# Patient Record
Sex: FEMALE | Race: ASIAN | NOT HISPANIC OR LATINO | Employment: FULL TIME | URBAN - METROPOLITAN AREA
[De-identification: names, ages, dates, MRNs, and addresses within clinical notes are randomized per-mention and may not be internally consistent; named-entity substitution may affect disease eponyms.]

---

## 2018-03-10 ENCOUNTER — TRANSFERRED RECORDS (OUTPATIENT)
Dept: HEALTH INFORMATION MANAGEMENT | Facility: CLINIC | Age: 28
End: 2018-03-10

## 2018-03-10 LAB — PAP SMEAR - HIM PATIENT REPORTED: NEGATIVE

## 2019-01-10 ENCOUNTER — OFFICE VISIT (OUTPATIENT)
Dept: OBGYN | Facility: CLINIC | Age: 29
End: 2019-01-10
Payer: COMMERCIAL

## 2019-01-10 VITALS
BODY MASS INDEX: 22.19 KG/M2 | SYSTOLIC BLOOD PRESSURE: 96 MMHG | DIASTOLIC BLOOD PRESSURE: 66 MMHG | WEIGHT: 113 LBS | HEIGHT: 60 IN

## 2019-01-10 DIAGNOSIS — N92.6 IRREGULAR MENSTRUAL CYCLE: Primary | ICD-10-CM

## 2019-01-10 PROCEDURE — 99202 OFFICE O/P NEW SF 15 MIN: CPT | Performed by: OBSTETRICS & GYNECOLOGY

## 2019-01-10 RX ORDER — TETANUS TOXOID, REDUCED DIPHTHERIA TOXOID AND ACELLULAR PERTUSSIS VACCINE, ADSORBED 5; 2.5; 8; 8; 2.5 [IU]/.5ML; [IU]/.5ML; UG/.5ML; UG/.5ML; UG/.5ML
SUSPENSION INTRAMUSCULAR
Refills: 0 | COMMUNITY
Start: 2018-10-21 | End: 2019-01-24

## 2019-01-10 RX ORDER — MULTIPLE VITAMINS W/ MINERALS TAB 9MG-400MCG
1 TAB ORAL DAILY
COMMUNITY
End: 2019-06-06

## 2019-01-10 SDOH — HEALTH STABILITY: MENTAL HEALTH: HOW OFTEN DO YOU HAVE A DRINK CONTAINING ALCOHOL?: NEVER

## 2019-01-10 ASSESSMENT — MIFFLIN-ST. JEOR: SCORE: 1170.09

## 2019-01-10 NOTE — PROGRESS NOTES
SUBJECTIVE:                                                   Marisel Perry is a 28 year old female P0 who presents to clinic today for the following health issue(s):  Patient presents with:  Consult: c/o irregular menses since periods started menses at age 13.  Trying for pregnancy x 3 months      HPI:  27 yo P0  female presents for evaluation of irregular periods.  This is her first visit in the [facility] system.  She states she experienced menarche in sixth grade commensurate with her peers.  She states she has irregular cycles which vary in length from 30-60 days her cycles are associated with moliminal symptoms.    She was  10 months ago and they have been attempting pregnancy for the last 3 months.  She states her  has had a semen analysis and it was negative.  He has not fathered any children or had any health problems they were just making sure.    Patient's general health is excellent.  He is presently on no medications.  She had a normal Pap smear in [facility] in 2018 and was told she would not need one for 3 years.  She has been told she has low insulin in the past and was put on insulin pills but states she never took them.    The patient desires to conceive and has educated herself regarding typical conception timeframe.    Patient's last menstrual period was 2018 (exact date)..   Patient is sexually active, .  Using none for contraception.     Problem list and histories reviewed & adjusted, as indicated.  Additional history: as documented.    There is no problem list on file for this patient.    Past Surgical History:   Procedure Laterality Date     NO HISTORY OF SURGERY  2019      Social History     Tobacco Use     Smoking status: Never Smoker     Smokeless tobacco: Never Used   Substance Use Topics     Alcohol use: No     Frequency: Never      Problem (# of Occurrences) Relation (Name,Age of Onset)    Diabetes Type 2  (1) Father              Current  "Outpatient Medications on File Prior to Visit:  multivitamin w/minerals (MULTI-VITAMIN) tablet Take 1 tablet by mouth daily   BOOSTRIX 5-2.5-18.5 LF-MCG/0.5 injection ADM 0.5ML IM UTD     No current facility-administered medications on file prior to visit.   No Known Allergies    ROS:  5 point ROS negative except as noted above in HPI, including Gen., Resp., CV, GI &  system review.    OBJECTIVE:     BP 96/66   Ht 1.518 m (4' 11.75\")   Wt 51.3 kg (113 lb)   LMP 12/25/2018 (Exact Date)   Breastfeeding? No   BMI 22.25 kg/m     BMI: Body mass index is 22.25 kg/m .  General: Alert and oriented, no distress.  Psychiatric: Mood and affect within normal limits.    Exam deferred    ASSESSMENT/PLAN:                                                        ICD-10-CM    1. Irregular menstrual cycle N92.6 Glucose     Hemoglobin A1c     Prolactin     TSH with free T4 reflex     Progesterone         Discussed typical cycle patterns and the need to establish whether she is ovulatory.    We will check a TSH prolactin glucose hemoglobin A1c and progesterone.  We will check a progesterone level in a mid luteal phase to determine ovulation.    Ultimately patient may benefit from ovulation induction agents but it would be premature to institute this at present.    Taco Singh MD  [facility]  "

## 2019-01-10 NOTE — Clinical Note
Pap smear done on this date: 3/10/2018, by this group: In [facility], results were Normal per patient needs next in 3 years.

## 2019-01-10 NOTE — NURSING NOTE
"Chief Complaint   Patient presents with     Consult     c/o irregular menses since periods started menses at age 13.  Trying for pregnancy x 3 months       Initial BP 96/66   Ht 1.518 m (4' 11.75\")   Wt 51.3 kg (113 lb)   LMP 2018 (Exact Date)   Breastfeeding? No   BMI 22.25 kg/m   Estimated body mass index is 22.25 kg/m  as calculated from the following:    Height as of this encounter: 1.518 m (4' 11.75\").    Weight as of this encounter: 51.3 kg (113 lb).  BP completed using cuff size: regular    Questioned patient about current smoking habits.  Pt. has never smoked.          The following HM Due: NONE      The following patient reported/Care Every where data was sent to:  P ABSTRACT QUALITY INITIATIVES [[id number]]  Pap smear done on this date: 3/10/2018, by this group: In [facility], results were Normal per patient needs next in 3 years.       Georgia Ramos CMA               "

## 2019-01-17 DIAGNOSIS — N92.6 IRREGULAR MENSTRUAL CYCLE: ICD-10-CM

## 2019-01-17 LAB
GLUCOSE SERPL-MCNC: 96 MG/DL (ref 70–99)
HBA1C MFR BLD: 5.2 % (ref 0–5.6)
PROGEST SERPL-MCNC: 0.4 NG/ML
PROLACTIN SERPL-MCNC: 18 UG/L (ref 3–27)
T4 FREE SERPL-MCNC: 1.12 NG/DL (ref 0.76–1.46)
TSH SERPL DL<=0.005 MIU/L-ACNC: 5.39 MU/L (ref 0.4–4)

## 2019-01-17 PROCEDURE — 84144 ASSAY OF PROGESTERONE: CPT | Performed by: OBSTETRICS & GYNECOLOGY

## 2019-01-17 PROCEDURE — 36415 COLL VENOUS BLD VENIPUNCTURE: CPT | Performed by: OBSTETRICS & GYNECOLOGY

## 2019-01-17 PROCEDURE — 82947 ASSAY GLUCOSE BLOOD QUANT: CPT | Performed by: OBSTETRICS & GYNECOLOGY

## 2019-01-17 PROCEDURE — 84439 ASSAY OF FREE THYROXINE: CPT | Performed by: OBSTETRICS & GYNECOLOGY

## 2019-01-17 PROCEDURE — 84443 ASSAY THYROID STIM HORMONE: CPT | Performed by: OBSTETRICS & GYNECOLOGY

## 2019-01-17 PROCEDURE — 84146 ASSAY OF PROLACTIN: CPT | Performed by: OBSTETRICS & GYNECOLOGY

## 2019-01-17 PROCEDURE — 83036 HEMOGLOBIN GLYCOSYLATED A1C: CPT | Performed by: OBSTETRICS & GYNECOLOGY

## 2019-01-24 ENCOUNTER — OFFICE VISIT (OUTPATIENT)
Dept: OBGYN | Facility: CLINIC | Age: 29
End: 2019-01-24
Payer: COMMERCIAL

## 2019-01-24 VITALS — DIASTOLIC BLOOD PRESSURE: 52 MMHG | BODY MASS INDEX: 22.19 KG/M2 | WEIGHT: 112.7 LBS | SYSTOLIC BLOOD PRESSURE: 84 MMHG

## 2019-01-24 DIAGNOSIS — N97.0 OLIGO-OVULATION: Primary | ICD-10-CM

## 2019-01-24 PROCEDURE — 99213 OFFICE O/P EST LOW 20 MIN: CPT | Performed by: OBSTETRICS & GYNECOLOGY

## 2019-01-24 RX ORDER — CLOMIPHENE CITRATE 50 MG/1
TABLET ORAL
Qty: 5 TABLET | Refills: 0 | Status: SHIPPED | OUTPATIENT
Start: 2019-01-24 | End: 2019-06-06

## 2019-01-24 NOTE — NURSING NOTE
"Chief Complaint   Patient presents with     RECHECK     review lab results       Initial BP (!) 84/52   Wt 51.1 kg (112 lb 11.2 oz)   LMP 2018 (Exact Date)   BMI 22.19 kg/m   Estimated body mass index is 22.19 kg/m  as calculated from the following:    Height as of 1/10/19: 1.518 m (4' 11.75\").    Weight as of this encounter: 51.1 kg (112 lb 11.2 oz).  BP completed using cuff size: regular    Questioned patient about current smoking habits.  Pt. has never smoked.          Georgia Ramos CMA               "

## 2019-01-24 NOTE — PROGRESS NOTES
Chief Complaint   Patient presents with     RECHECK     review lab results       Subjective:  29 yo P0 with Q30-60 day cycles and interested in conception.    We reviewed lab results form 1/10/19 visit  Results for GIANNA SWENSON (MRN [id number]) as of 1/24/2019 09:44   Ref. Range 1/17/2019 07:41   Hemoglobin A1C Latest Ref Range: 0 - 5.6 % 5.2   Progesterone Latest Units: ng/mL 0.4   Prolactin Latest Ref Range: 3 - 27 ug/L 18   T4 Free Latest Ref Range: 0.76 - 1.46 ng/dL 1.12   TSH Latest Ref Range: 0.40 - 4.00 mU/L 5.39 (H)   Glucose Latest Ref Range: 70 - 99 mg/dL 96     REVIEW OF SYSTEMS:  neg    Health Maintenance   Topic Date Due     HPV IMMUNIZATION (1 - Female 3-dose series) 06/28/2003     PHQ-2 Q1 YR  06/28/2004     HIV SCREEN (SYSTEM ASSIGNED)  06/28/2010     INFLUENZA VACCINE (1) 09/01/2018     PAP SCREENING Q3 YR (SYSTEM ASSIGNED)  03/10/2021     DTAP/TDAP/TD IMMUNIZATION (2 - Td) 10/21/2028     ZOSTER IMMUNIZATION (1 of 2) 06/28/2042     IPV IMMUNIZATION  Aged Out     MENINGITIS IMMUNIZATION  Aged Out       No Known Allergies    Objective:  Vitals: BP (!) 84/52   Wt 51.1 kg (112 lb 11.2 oz)   LMP 12/25/2018 (Exact Date)   BMI 22.19 kg/m        Risks, benefits, and alternatives to Clomid use discussed in detail, including ovarian cysts, pelvic pain, increased risks of twinning (5-9%), increased risks of twin pregnancy. She should call with any severe pain, or present to the ED if not during clinic hours. This is rare.    If she does not conceive in the first cycle, will consider increasing the dose for the second cycle to 100 mg, and checking a day 21 progesterone to document ovulation.      Assessment/Plan:  1. Oligo-ovulation  Cycle pattern and progesterone level c/w oligo ovulation    Patient desires to begin ovulation induction     - clomiPHENE (CLOMID) 50 MG tablet; Days 3-7 of cycle  Dispense: 5 tablet; Refill: 0        Joseph Singh MD

## 2019-04-12 DIAGNOSIS — N97.9 FEMALE INFERTILITY: ICD-10-CM

## 2019-04-12 LAB — PROGEST SERPL-MCNC: 0.2 NG/ML

## 2019-04-12 PROCEDURE — 36415 COLL VENOUS BLD VENIPUNCTURE: CPT | Performed by: OBSTETRICS & GYNECOLOGY

## 2019-04-12 PROCEDURE — 84144 ASSAY OF PROGESTERONE: CPT | Performed by: OBSTETRICS & GYNECOLOGY

## 2019-04-12 RX ORDER — MEDROXYPROGESTERONE ACETATE 10 MG
10 TABLET ORAL DAILY
Qty: 10 TABLET | Refills: 0 | Status: SHIPPED | OUTPATIENT
Start: 2019-04-12 | End: 2019-06-06

## 2019-04-12 RX ORDER — CLOMIPHENE CITRATE 50 MG/1
TABLET ORAL
Qty: 10 TABLET | Refills: 0 | Status: SHIPPED | OUTPATIENT
Start: 2019-04-12 | End: 2019-06-06

## 2019-05-02 ENCOUNTER — MYC MEDICAL ADVICE (OUTPATIENT)
Dept: OBGYN | Facility: CLINIC | Age: 29
End: 2019-05-02

## 2019-05-02 ENCOUNTER — OFFICE VISIT (OUTPATIENT)
Dept: OBGYN | Facility: CLINIC | Age: 29
End: 2019-05-02
Payer: COMMERCIAL

## 2019-05-02 VITALS — DIASTOLIC BLOOD PRESSURE: 60 MMHG | BODY MASS INDEX: 22.63 KG/M2 | SYSTOLIC BLOOD PRESSURE: 94 MMHG | WEIGHT: 114.9 LBS

## 2019-05-02 DIAGNOSIS — N97.9 FEMALE INFERTILITY: Primary | ICD-10-CM

## 2019-05-02 LAB — HCG UR QL: NEGATIVE

## 2019-05-02 PROCEDURE — 99212 OFFICE O/P EST SF 10 MIN: CPT | Performed by: OBSTETRICS & GYNECOLOGY

## 2019-05-02 PROCEDURE — 81025 URINE PREGNANCY TEST: CPT | Performed by: OBSTETRICS & GYNECOLOGY

## 2019-05-02 NOTE — NURSING NOTE
"Chief Complaint   Patient presents with     Infertililty   Neg home urine hcg 2 days ago. Does c/o bilat breast tenderness and nausea x 2 days.    C/o brownish discharge on tissue x 2 days    Initial BP 94/60   Wt 52.1 kg (114 lb 14.4 oz)   LMP 2019 (Exact Date)   BMI 22.63 kg/m   Estimated body mass index is 22.63 kg/m  as calculated from the following:    Height as of 1/10/19: 1.518 m (4' 11.75\").    Weight as of this encounter: 52.1 kg (114 lb 14.4 oz).  BP completed using cuff size: regular    Questioned patient about current smoking habits.  Pt. has never smoked.          The following HM Due: NONE      Georgia Ramos CMA                "

## 2019-05-02 NOTE — PROGRESS NOTES
Here for follow-up of infertility    28-year-old with oligomenorrhea on Clomid.  Took Provera from 4/12-4/21 and and now experiencing withdrawal bleed.    Patient had done 2 home pregnancy tests that few days ago which were negative but was experiencing some breast tenderness and nausea.  Urinary pregnancy test here was negative.    We had plan on increasing Clomid to 100 mg daily days 3 through 7 she was advised to begin this tomorrow.    As she is demonstrating some resistance to the Clomid I advised referral to [facility] specialist be initiated at this time.  A referral form was filled out and provided to the patient along with contact information for [facility].    Physical examination was deferred

## 2019-05-22 ENCOUNTER — MYC MEDICAL ADVICE (OUTPATIENT)
Dept: OBGYN | Facility: CLINIC | Age: 29
End: 2019-05-22

## 2019-05-23 ENCOUNTER — MYC MEDICAL ADVICE (OUTPATIENT)
Dept: OBGYN | Facility: CLINIC | Age: 29
End: 2019-05-23

## 2019-05-23 DIAGNOSIS — N97.0 OLIGO-OVULATION: Primary | ICD-10-CM

## 2019-05-23 RX ORDER — CLOMIPHENE CITRATE 50 MG/1
TABLET ORAL
Qty: 10 TABLET | Refills: 0 | Status: SHIPPED | OUTPATIENT
Start: 2019-05-23 | End: 2019-06-06

## 2019-06-04 DIAGNOSIS — N93.9 ABNORMAL UTERINE BLEEDING (AUB): ICD-10-CM

## 2019-06-06 ENCOUNTER — OFFICE VISIT (OUTPATIENT)
Dept: OBGYN | Facility: CLINIC | Age: 29
End: 2019-06-06
Payer: COMMERCIAL

## 2019-06-06 VITALS
WEIGHT: 115 LBS | DIASTOLIC BLOOD PRESSURE: 56 MMHG | BODY MASS INDEX: 22.58 KG/M2 | SYSTOLIC BLOOD PRESSURE: 90 MMHG | HEIGHT: 60 IN

## 2019-06-06 DIAGNOSIS — N97.9 FEMALE INFERTILITY: Primary | ICD-10-CM

## 2019-06-06 DIAGNOSIS — N92.6 IRREGULAR MENSTRUAL CYCLE: ICD-10-CM

## 2019-06-06 DIAGNOSIS — N97.0 OLIGO-OVULATION: ICD-10-CM

## 2019-06-06 DIAGNOSIS — E03.8 SUBCLINICAL HYPOTHYROIDISM: ICD-10-CM

## 2019-06-06 PROCEDURE — 99215 OFFICE O/P EST HI 40 MIN: CPT | Performed by: OBSTETRICS & GYNECOLOGY

## 2019-06-06 RX ORDER — LETROZOLE 2.5 MG/1
7.5 TABLET, FILM COATED ORAL DAILY
Qty: 5 TABLET | Refills: 1 | Status: SHIPPED | OUTPATIENT
Start: 2019-06-06 | End: 2019-08-20

## 2019-06-06 RX ORDER — LEVOTHYROXINE SODIUM 50 UG/1
50 TABLET ORAL DAILY
Qty: 90 TABLET | Refills: 3 | Status: ON HOLD | OUTPATIENT
Start: 2019-06-06 | End: 2019-11-20

## 2019-06-06 RX ORDER — PRENATAL VIT/IRON FUM/FOLIC AC 27MG-0.8MG
1 TABLET ORAL DAILY
COMMUNITY
End: 2019-09-10

## 2019-06-06 ASSESSMENT — MIFFLIN-ST. JEOR: SCORE: 1179.17

## 2019-06-06 NOTE — PATIENT INSTRUCTIONS
Cycle instructions:    The first day of bleeding/period is called Day 1.    Letrozole is taken Days 3-7 of cycle, about same time each day.  Take the medication even if you are still bleeding.      Start testing for ovulation using the store bought ovulation predictor kits on Day 11 of cycle.  When you get a positive surge, you will most likely be ovulating within the next 24 hours. Test the urine about the same time each day (should try to test between 2-4 pm, empty bladder about noon.)    The test is positive when you see 2 dark solid lines (one faint line and one dark line is NOT positive). Follow the directions with the kit, as some brands and digital tests will have different positive results. Once the test is positive, you can stop testing. Have sex/intercourse the day the test is positive and the next day.    Schedule a lab only appointment for about 7-9 days after your ovulation test is positive to check the progesterone level and make sure ovulation occurred.     If you don't have a period by 15 days after LH kit is positive (surge), then do a urine pregnancy test at home and call clinic if positive. If it is negative, retest in 5-7 days if no period. Call or send a NPTV message with your results. Depending on the ultrasound results and labs, we may need to adjust the dose if you are not pregnant in this cycle.    Basic info:    The ovulation predictor kits are found in the condom/tampon section of the store.  Clear Blue Easy brand is simple to read.    Most women will get a positive LH surge before day 20 of the cycle.    Letrozole is not FDA approved for this indication (ovulation help), but there are studies showing pregnancy rates that are higher than with Clomid, especially when Clomid has not worked.  Aromatase inhibitors are generally well tolerated. The main side effects are hot flushes and gastrointestinal events (nausea and vomiting), headache, back pain, and leg cramps.  Take it at same time  each night to minimize side effects.    There is a chance of twins (around 3-4%) Rarely, women will develop painful cysts on the ovaries.     Do not use lubricants with intercourse when trying to get pregnant, unless you use a lubricant called PreSeed, which is fine.

## 2019-06-06 NOTE — NURSING NOTE
"Chief Complaint   Patient presents with     Consult     Infertility       Initial BP 90/56 (BP Location: Right arm, Patient Position: Chair, Cuff Size: Adult Regular)   Ht 1.518 m (4' 11.75\")   Wt 52.2 kg (115 lb)   LMP 2019 (Exact Date)   Breastfeeding? No   BMI 22.65 kg/m   Estimated body mass index is 22.65 kg/m  as calculated from the following:    Height as of this encounter: 1.518 m (4' 11.75\").    Weight as of this encounter: 52.2 kg (115 lb).  BP completed using cuff size: regular    Questioned patient about current smoking habits.  Pt. has never smoked.          The following HM Due: NONE    Rashmi Forrest CMA      "

## 2019-06-06 NOTE — PROGRESS NOTES
FERTILITY OB/GYN OFFICE VISIT    Marisel Perry was seen today for initial infertility consultation.   She is a 28 year old year old  who has been attempting conception for 1 year(s).  Her partner has not fathered any children.  She has tried Clomid x3 cycles with no success (50 mg, 100 mg, 100 mg).  She most recently started Clomid on 2019.  States she has not had positive results on her home ovulation kits.  On 2019 and 2018 her progesterone levels resulted low indicating lack of ovulation.  In January she had a TSH level that resulted elevated at 5.39 with a normal T4.  On 2019 pelvic ultrasound demonstrated a right ovary with multiple follicles.  She reports a known history of PCOS.  Her partner reports a normal semen analysis.    Prior pregnancy history is as follows:   OB History    Para Term  AB Living   0 0 0 0 0 0   SAB TAB Ectopic Multiple Live Births   0 0 0 0 0     History of dilation and suction curettage: No.    HX:  No Known Allergies  Past Medical History:   Diagnosis Date     Primary female infertility      Past Surgical History:   Procedure Laterality Date     NO HISTORY OF SURGERY  2019     Social History     Socioeconomic History     Marital status:      Spouse name: Not on file     Number of children: Not on file     Years of education: Not on file     Highest education level: Not on file   Occupational History     Not on file   Social Needs     Financial resource strain: Not on file     Food insecurity:     Worry: Not on file     Inability: Not on file     Transportation needs:     Medical: Not on file     Non-medical: Not on file   Tobacco Use     Smoking status: Never Smoker     Smokeless tobacco: Never Used   Substance and Sexual Activity     Alcohol use: No     Frequency: Never     Drug use: No     Sexual activity: Yes     Partners: Female     Birth control/protection: None   Lifestyle     Physical activity:     Days per week: Not on  "file     Minutes per session: Not on file     Stress: Not on file   Relationships     Social connections:     Talks on phone: Not on file     Gets together: Not on file     Attends Zoroastrian service: Not on file     Active member of club or organization: Not on file     Attends meetings of clubs or organizations: Not on file     Relationship status: Not on file     Intimate partner violence:     Fear of current or ex partner: Not on file     Emotionally abused: Not on file     Physically abused: Not on file     Forced sexual activity: Not on file   Other Topics Concern     Not on file   Social History Narrative     Not on file     Family History   Problem Relation Age of Onset     Diabetes Type 2  Father      Current Outpatient Medications   Medication Sig Dispense Refill     Prenatal Vit-Fe Fumarate-FA (PRENATAL MULTIVITAMIN W/IRON) 27-0.8 MG tablet Take 1 tablet by mouth daily             ROS: negative for 12 systems unless noted above in HPI     EXAM:  Vitals: BP 90/56 (BP Location: Right arm, Patient Position: Chair, Cuff Size: Adult Regular)   Ht 1.518 m (4' 11.75\")   Wt 52.2 kg (115 lb)   LMP 05/22/2019 (Exact Date)   Breastfeeding? No   BMI 22.65 kg/m    BMI= Body mass index is 22.65 kg/m .      Gen: Alert, oriented, in no distress. Normal body habitus. Complete development of secondary sexual characteristics.   Psych: Normal mood and appropriate affect.  Skin: No signs of androgen excess - no hirsutism, acne, male pattern baldness.  HEENT: Thyroid not enlarged.  Breasts: No evidence of galactorrhea.  Lungs: Respirations unlabored and without SOB.   Abdominal exam: No mass, tenderness, or organomegaly. Abdominal obesity: No.  Chest: Not shield/square shaped to suggest Faustin Syndrome  Pelvic exam: External genitalia within normal limits. No virilization. Urethra is without lesion and nontender to palpation. Bladder is nontender. On speculum exam, cervix is without lesion and vagina is normal without " lesion or discharge. No vaginal/cervical structural abnormalities or discharge to suggest the presence of a mullerian anomaly, infection or cervical factor. On bimanual exam, the uterus is mobile and non-tender; size and mobility normal. The adnexa are negative bilaterally for mass or tenderness. No tenderness, nodules or masses in the posterior cul-de-sac, uterosacral ligaments, or rectovaginal septum.        ASSESSMENT/ PLAN:    1. Female infertility  - levothyroxine (SYNTHROID/LEVOTHROID) 50 MCG tablet; Take 1 tablet (50 mcg) by mouth daily  Dispense: 90 tablet; Refill: 3  - Thyroid peroxidase antibody; Future  - progesterone (PROMETRIUM) 100 MG capsule; Take 1 capsule (100 mg) by mouth daily for 10 days  Dispense: 10 capsule; Refill: 1  - letrozole (FEMARA) 2.5 MG tablet; Take 3 tablets (7.5 mg) by mouth daily for 5 days  Dispense: 15 tablet; Refill: 1  - Progesterone; Future    2. Irregular menstrual cycle      3. Oligo-ovulation      4. Subclinical hypothyroidism  - levothyroxine (SYNTHROID/LEVOTHROID) 50 MCG tablet; Take 1 tablet (50 mcg) by mouth daily  Dispense: 90 tablet; Refill: 3  - Thyroid peroxidase antibody; Future      Plan labs as ordered.  Will treat subclinical hypothyroidism given infertility. TPO antibiody ordered to be drawn at time of progesterone level.  Offered referral to [facility] or [facility] and patient and  declined at this time.  They would like to change to Femara/letrozole PO instead of Clomid after the current cycle.  As they have already tried 2 rounds of Clomid without successful ovulation and are currently on the 3rd cycle.  Prometrium prescribed if no period and negative pregnancy test. Will start at 7.5 mg with the next cycle if indicated.     Cycle instructions reviewed in detail as outlined in the patient instructions.    Total face to face time 45 minutes in history taking, fertility education, discussion of her next steps in the plan, with > 50% spent counseling and/or  coordination of care.    Neeta Aguirre, DO

## 2019-06-21 ENCOUNTER — NURSE TRIAGE (OUTPATIENT)
Dept: NURSING | Facility: CLINIC | Age: 29
End: 2019-06-21

## 2019-06-21 NOTE — TELEPHONE ENCOUNTER
Pharmacist (Khadijah) from [facility] [phone] calls re Patient.  States she thought she was calling the clinic and sates she will follow up with the Provider on Monday 6/24/19.     Protocol-  Medication Question- Adult  Care advice reviewed.   Disposition-  Follow up with the Provider within 24 hours.  Caller states understanding of the recommended disposition.   Caller states her question is not urgent and can wait until Monday.   Advised to call back if further questions or concerns.     LANDRY Bass RN  [facility]     Reason for Disposition    Caller has NON-URGENT medication question about med that PCP prescribed and triager unable to answer question    Protocols used: MEDICATION QUESTION CALL-A-

## 2019-07-22 DIAGNOSIS — Z32.01 PREGNANCY TEST POSITIVE: ICD-10-CM

## 2019-07-22 DIAGNOSIS — E03.8 SUBCLINICAL HYPOTHYROIDISM: ICD-10-CM

## 2019-07-22 PROCEDURE — 84144 ASSAY OF PROGESTERONE: CPT | Performed by: OBSTETRICS & GYNECOLOGY

## 2019-07-22 PROCEDURE — 86376 MICROSOMAL ANTIBODY EACH: CPT | Performed by: OBSTETRICS & GYNECOLOGY

## 2019-07-22 PROCEDURE — 36415 COLL VENOUS BLD VENIPUNCTURE: CPT | Performed by: OBSTETRICS & GYNECOLOGY

## 2019-07-22 PROCEDURE — 84702 CHORIONIC GONADOTROPIN TEST: CPT | Performed by: OBSTETRICS & GYNECOLOGY

## 2019-07-23 LAB
B-HCG SERPL-ACNC: 991 IU/L (ref 0–5)
PROGEST SERPL-MCNC: 60 NG/ML
THYROPEROXIDASE AB SERPL-ACNC: 11 IU/ML

## 2019-08-19 ASSESSMENT — PATIENT HEALTH QUESTIONNAIRE - PHQ9
SUM OF ALL RESPONSES TO PHQ QUESTIONS 1-9: 5
SUM OF ALL RESPONSES TO PHQ QUESTIONS 1-9: 5
10. IF YOU CHECKED OFF ANY PROBLEMS, HOW DIFFICULT HAVE THESE PROBLEMS MADE IT FOR YOU TO DO YOUR WORK, TAKE CARE OF THINGS AT HOME, OR GET ALONG WITH OTHER PEOPLE: NOT DIFFICULT AT ALL

## 2019-08-20 ENCOUNTER — PRENATAL OFFICE VISIT (OUTPATIENT)
Dept: NURSING | Facility: CLINIC | Age: 29
End: 2019-08-20
Payer: COMMERCIAL

## 2019-08-20 VITALS
BODY MASS INDEX: 23.16 KG/M2 | HEART RATE: 64 BPM | WEIGHT: 118 LBS | HEIGHT: 60 IN | DIASTOLIC BLOOD PRESSURE: 44 MMHG | SYSTOLIC BLOOD PRESSURE: 86 MMHG

## 2019-08-20 DIAGNOSIS — E03.9 HYPOTHYROIDISM: Primary | ICD-10-CM

## 2019-08-20 DIAGNOSIS — Z34.01 ENCOUNTER FOR SUPERVISION OF NORMAL FIRST PREGNANCY IN FIRST TRIMESTER: ICD-10-CM

## 2019-08-20 LAB
ERYTHROCYTE [DISTWIDTH] IN BLOOD BY AUTOMATED COUNT: 12.3 % (ref 10–15)
HCT VFR BLD AUTO: 34.5 % (ref 35–47)
HGB BLD-MCNC: 11.8 G/DL (ref 11.7–15.7)
MCH RBC QN AUTO: 29.4 PG (ref 26.5–33)
MCHC RBC AUTO-ENTMCNC: 34.2 G/DL (ref 31.5–36.5)
MCV RBC AUTO: 86 FL (ref 78–100)
PLATELET # BLD AUTO: 256 10E9/L (ref 150–450)
RBC # BLD AUTO: 4.02 10E12/L (ref 3.8–5.2)
WBC # BLD AUTO: 9.3 10E9/L (ref 4–11)

## 2019-08-20 PROCEDURE — 86850 RBC ANTIBODY SCREEN: CPT | Performed by: OBSTETRICS & GYNECOLOGY

## 2019-08-20 PROCEDURE — 86780 TREPONEMA PALLIDUM: CPT | Performed by: OBSTETRICS & GYNECOLOGY

## 2019-08-20 PROCEDURE — 87591 N.GONORRHOEAE DNA AMP PROB: CPT | Performed by: OBSTETRICS & GYNECOLOGY

## 2019-08-20 PROCEDURE — 87389 HIV-1 AG W/HIV-1&-2 AB AG IA: CPT | Performed by: OBSTETRICS & GYNECOLOGY

## 2019-08-20 PROCEDURE — 36415 COLL VENOUS BLD VENIPUNCTURE: CPT | Performed by: OBSTETRICS & GYNECOLOGY

## 2019-08-20 PROCEDURE — 87491 CHLMYD TRACH DNA AMP PROBE: CPT | Performed by: OBSTETRICS & GYNECOLOGY

## 2019-08-20 PROCEDURE — 86762 RUBELLA ANTIBODY: CPT | Performed by: OBSTETRICS & GYNECOLOGY

## 2019-08-20 PROCEDURE — 87086 URINE CULTURE/COLONY COUNT: CPT | Performed by: OBSTETRICS & GYNECOLOGY

## 2019-08-20 PROCEDURE — 84443 ASSAY THYROID STIM HORMONE: CPT | Performed by: OBSTETRICS & GYNECOLOGY

## 2019-08-20 PROCEDURE — 84439 ASSAY OF FREE THYROXINE: CPT | Performed by: OBSTETRICS & GYNECOLOGY

## 2019-08-20 PROCEDURE — 87340 HEPATITIS B SURFACE AG IA: CPT | Performed by: OBSTETRICS & GYNECOLOGY

## 2019-08-20 PROCEDURE — 99207 ZZC NO CHARGE NURSE ONLY: CPT

## 2019-08-20 PROCEDURE — 86900 BLOOD TYPING SEROLOGIC ABO: CPT | Performed by: OBSTETRICS & GYNECOLOGY

## 2019-08-20 PROCEDURE — 85027 COMPLETE CBC AUTOMATED: CPT | Performed by: OBSTETRICS & GYNECOLOGY

## 2019-08-20 PROCEDURE — 86901 BLOOD TYPING SEROLOGIC RH(D): CPT | Performed by: OBSTETRICS & GYNECOLOGY

## 2019-08-20 SDOH — ECONOMIC STABILITY: TRANSPORTATION INSECURITY
IN THE PAST 12 MONTHS, HAS LACK OF TRANSPORTATION KEPT YOU FROM MEETINGS, WORK, OR FROM GETTING THINGS NEEDED FOR DAILY LIVING?: NO

## 2019-08-20 SDOH — ECONOMIC STABILITY: FOOD INSECURITY: WITHIN THE PAST 12 MONTHS, YOU WORRIED THAT YOUR FOOD WOULD RUN OUT BEFORE YOU GOT MONEY TO BUY MORE.: NEVER TRUE

## 2019-08-20 SDOH — ECONOMIC STABILITY: TRANSPORTATION INSECURITY
IN THE PAST 12 MONTHS, HAS THE LACK OF TRANSPORTATION KEPT YOU FROM MEDICAL APPOINTMENTS OR FROM GETTING MEDICATIONS?: NO

## 2019-08-20 SDOH — ECONOMIC STABILITY: FOOD INSECURITY: WITHIN THE PAST 12 MONTHS, THE FOOD YOU BOUGHT JUST DIDN'T LAST AND YOU DIDN'T HAVE MONEY TO GET MORE.: NEVER TRUE

## 2019-08-20 SDOH — ECONOMIC STABILITY: INCOME INSECURITY: HOW HARD IS IT FOR YOU TO PAY FOR THE VERY BASICS LIKE FOOD, HOUSING, MEDICAL CARE, AND HEATING?: NOT HARD AT ALL

## 2019-08-20 ASSESSMENT — PATIENT HEALTH QUESTIONNAIRE - PHQ9: SUM OF ALL RESPONSES TO PHQ QUESTIONS 1-9: 5

## 2019-08-20 ASSESSMENT — MIFFLIN-ST. JEOR: SCORE: 1191.74

## 2019-08-20 NOTE — PROGRESS NOTES
Chief Complaint   Patient presents with     Prenatal Care     New Prenatal Nurse Visit   8w3d  Estimated Date of Delivery: Mar 28, 2020      Initial BP (!) 86/44 (BP Location: Right arm, Cuff Size: Adult Regular)   Pulse 64   Ht 1.524 m (5')   Wt 53.5 kg (118 lb)   LMP 06/22/2019   BMI 23.05 kg/m   Estimated body mass index is 23.05 kg/m  as calculated from the following:    Height as of this encounter: 1.524 m (5').    Weight as of this encounter: 53.5 kg (118 lb).  BP completed using cuff size: regular    Questioned patient about current smoking habits.  Pt. has never smoked.    Patient is accompanied by . Prenatal book and folder (containing standard educational hand-outs and brochures) given to patient. Information in folder reviewed. Questions answered. Brochure given on optional screening available to assess chromosomal anomalies. Pt advised to call the clinic if she has any questions or concerns related to her pregnancy. Prenatal labs obtained. New prenatal visit scheduled on 9/3/19 with Dr Singh.        No results found for: PAP        Patient supplied answers from flow sheet for:  Prenatal OB Questionnaire.  Past Medical History  Diabetes?: No  Hypertension : No  Heart disease, mitral valve prolapse or rheumatic fever?: No  An autoimmune disease such as lupus or rheumatoid arthritis?: No  Kidney disease or urinary tract infection?: No  Epilepsy, seizures or spells?: No  Migraine headaches?: No  A stroke or loss of function or sensation?: No  Any other neurological problems?: No  Have you ever been treated for depression?: No  Are you having problems with crying spells or loss of self-esteem?: No  Have you ever required psychiatric care?: No  Have you ever had hepatitis, liver disease or jaundice?: (at birth)  Have you been treated for blood clots in your veins, deep vein thromosis, inflammation in the veins, thrombosis, phlebitis, pulmonary embolism or varicosities?: No  Have you had excessive  bleeding after surgery or dental work?: No  Do you bleed more than other women after a cut or scratch?: No  Do you have a history of anemia?: (!) Yes  Have you ever had thyroid problems or taken thyroid medication?: (as a child)   Do you have any endocrine problems?: No  Have you ever been in a major accident or suffered serious trauma?: No  Within the last year, has anyone hit, slapped, kicked or otherwise hurt you?: No  In the last year, has anyone forced you to have sex when you didn't want to?: No    Past Medical History 2   Have you ever received a blood transfusion?: (age 4-5 for anemia)  Would you refuse a blood transfusion if a doctor judged it to be medically necessary?: No   If you answered Yes, would you rather die than receive a blood transfusion?: No  If you answered Yes, is this for Uatsdin reasons?: No  Does anyone in your home smoke?: No  Do you use tobacco products?: No  Do you drink beer, wine or hard liquor?: No  Do you use any of the following: marijuana, speed, cocaine, heroin, hallucinogens or other drugs?: No   Is your blood type Rh negative?: No  Have you ever had abnormal antibodies in your blood?: No  Have you ever had asthma?: No  Have you ever had tuberculosis?: No  Do you have any allergies to drugs or over-the-counter medications?: No  Allergies: Dust Mites, Aspartame, Ethanol, Venlafaxine, Hydrochloride, Sertraline: No  Have you had any breast problems?: No  Have you ever ?: No  Have you had any gynecological surgical procedures such as cervical conization, a LEEP procedure, laser treatment, cryosurgery of the cervix or a dilation and curettage, etc?: No  Have you ever had any other surgical procedures?: No  Have you been hospitalized for a nonsurgical reason excluding normal delivery?: No  Have you ever had any anesthetic complications?: No  Have you ever had an abnormal pap smear?: No    Past Medical History (Continued)  Do you have a history of abnormalities of the  uterus?: No  Did your mother take WILLY or any other hormones when she was pregnant with you?: No  Did it take you more than a year to become pregnant?: (!) Yes  Have you ever been evaluated or treated for infertility?: (has been on clomid x 2, letrazole x 1)  Is there a history of medical problems in your family, which you feel may be important to this pregnancy?: No  Do you have any other problems we have not asked about which you feel may be important to this pregnancy?: No    Symptoms since last menstrual period  Do you have any of the following symptoms: abdominal pain, blood in stools or urine, chest pain, shortness of breath, coughing or vomiting up blood, your heart racing or skipping beats, nausea and vomiting, pain on urination or vaginal discharge or bleed: No  Current medications, including over-the-counter medications, you are using? (If not applicable answer none): (PNV)  Will the patient be 35 years old or older at the time of delivery?: No    Has the patient, baby's father or anyone in either family had:  Thalassemia (Italian, Greek, Mediterranean or  background only) and an MCV result less than 80?: No  Neural tube defect such as meningomyelocele, spina bifida or anencephaly?: No  Congenital heart defect?: No  Down's Syndrome?: No  Marco-Sachs disease (Advent, Cajun, Omani-Throckmorton)?: No  Sickle cell disease or trait ()?: No  Hemophilia or other inherited problems of blood?: No  Muscular dystrophy?: No  Cystic fibrosis?: No  [place]'s chorea?: No  Mental retardation/autism?: No  If yes, was the person tested for fragile X?: No  Any other inherited genetic or chromosomal disorder?: No  Maternal metabolic disorder (e.g Insulin-dependent diabetes, PKU)?: No  A child with birth defects not listed above?: No  Recurrent pregnancy loss or stillbirth?: No   Has the patient had any medications/street drugs/alcohol since her last menstrual period?: No  Does the patient or baby's father have any  other genetic risks?: No    Infection History   Do you object to being tested for Hepatitis B?: No  Do you object to being tested for HIV?: No   Do you feel that you are at high risk for coming in contact with the AIDS virus?: No  Have you ever been treated for tuberculosis?: No  Have you ever had a positive skin test for tuberculosis?: No  Do you live with someone who has tuberculosis?: No  Have you ever been exposed to tuberculosis?: No  Do you have genital herpes?: No  Does your partner have genital herpes?: No  Have you had a viral illness since your last period?: No  Have you ever had gonorrhea, chlamydia, syphilis, venereal warts, trichomoniasis, pelvic inflammatory disease or any other sexually transmitted disease?: No  Do you know if you are a genital group B streptococcus carrier?: No  Have you had chicken pox/varicella?: (!) Yes   Have you been vaccinated against chicken Pox?: (!) Yes  Have you had any other infectious diseases?: No    Pinky Nolan RN

## 2019-08-21 ENCOUNTER — OFFICE VISIT (OUTPATIENT)
Dept: OBGYN | Facility: CLINIC | Age: 29
End: 2019-08-21
Payer: COMMERCIAL

## 2019-08-21 DIAGNOSIS — Z34.01 ENCOUNTER FOR SUPERVISION OF NORMAL FIRST PREGNANCY IN FIRST TRIMESTER: ICD-10-CM

## 2019-08-21 DIAGNOSIS — O02.1 MISSED ABORTION: Primary | ICD-10-CM

## 2019-08-21 LAB
ABO + RH BLD: NORMAL
ABO + RH BLD: NORMAL
BACTERIA SPEC CULT: NORMAL
BLD GP AB SCN SERPL QL: NORMAL
BLOOD BANK CMNT PATIENT-IMP: NORMAL
HBV SURFACE AG SERPL QL IA: NONREACTIVE
HIV 1+2 AB+HIV1 P24 AG SERPL QL IA: NONREACTIVE
SPECIMEN EXP DATE BLD: NORMAL
SPECIMEN SOURCE: NORMAL
T4 FREE SERPL-MCNC: 1.35 NG/DL (ref 0.76–1.46)
TSH SERPL DL<=0.005 MIU/L-ACNC: 0.93 MU/L (ref 0.4–4)

## 2019-08-21 PROCEDURE — 84702 CHORIONIC GONADOTROPIN TEST: CPT | Performed by: ADVANCED PRACTICE MIDWIFE

## 2019-08-21 PROCEDURE — 36415 COLL VENOUS BLD VENIPUNCTURE: CPT | Performed by: ADVANCED PRACTICE MIDWIFE

## 2019-08-21 PROCEDURE — 99213 OFFICE O/P EST LOW 20 MIN: CPT | Performed by: ADVANCED PRACTICE MIDWIFE

## 2019-08-21 NOTE — NURSING NOTE
Chief Complaint   Patient presents with     Prenatal Care     Dating ultrasound done today - here with spouse.  Discuss results.     initial LMP 06/22/2019  Estimated body mass index is 23.05 kg/m  as calculated from the following:    Height as of 8/20/19: 1.524 m (5').    Weight as of 8/20/19: 53.5 kg (118 lb).  BP completed using cuff size NA (Not Taken).  Georgia Ramos CMA

## 2019-08-21 NOTE — PROGRESS NOTES
SUBJECTIVE: Marisel Perry is a 29 year old female   at 8w4d.  She had an ultrasound performed in clinic today confirming missed AB, no fetal heartrate.  Patient was informed of these results, and she expresses appropriate sadness at loss.  Reassured her that the loss was not caused by and could not have been prevented by her actions or any other person's actions. Patient was taking clomid and progesterone to aid in conception. She was expected to be 8 weeks and 4 days by LMP. She denies any bleeding or cramping currently. She does reports breast tenderness and mild nausea.     OBJECTIVE: Vitals: LMP 2019   BMI= There is no height or weight on file to calculate BMI.  Physical Exam   Constitutional: She is oriented to person, place, and time. She appears well-developed and well-nourished.   Pulmonary/Chest: Effort normal.   Neurological: She is alert and oriented to person, place, and time.   Skin: Skin is warm and dry.   Psychiatric: She has a normal mood and affect. Her behavior is normal. Judgment and thought content normal.     ASSESSMENT: Missed AB  (O02.1) Missed   (primary encounter diagnosis)  Plan: HCG quantitative pregnancy, US OB < 14 Weeks         Single    PLAN:   Discussed MAB vs. early gestation. Discussed since patient was tracking her cycles and taking medications it is more likely that this is an early loss. HCG today. ABO type and screen in process from prenatal lab draw yesterday. Ordered follow up viability scan in 1 week. Will notify patient of results and form plan of care. Discussed warning signs of miscarriage and when to call clinic. Patient and  verbalize understanding and agree with plan.     Christy Brizuela CNM

## 2019-08-22 LAB
B-HCG SERPL-ACNC: ABNORMAL IU/L (ref 0–5)
C TRACH DNA SPEC QL NAA+PROBE: NEGATIVE
N GONORRHOEA DNA SPEC QL NAA+PROBE: NEGATIVE
RUBV IGG SERPL IA-ACNC: 59 IU/ML
SPECIMEN SOURCE: NORMAL
SPECIMEN SOURCE: NORMAL
T PALLIDUM AB SER QL: NONREACTIVE

## 2019-08-23 ENCOUNTER — MYC MEDICAL ADVICE (OUTPATIENT)
Dept: OBGYN | Facility: CLINIC | Age: 29
End: 2019-08-23

## 2019-08-23 NOTE — TELEPHONE ENCOUNTER
Pt requests U/s and labs be released to [facility].    Alyssa SOUZA R.N.  [facility]

## 2019-08-29 ENCOUNTER — OFFICE VISIT (OUTPATIENT)
Dept: OBGYN | Facility: CLINIC | Age: 29
End: 2019-08-29
Payer: COMMERCIAL

## 2019-08-29 ENCOUNTER — ANCILLARY PROCEDURE (OUTPATIENT)
Dept: ULTRASOUND IMAGING | Facility: CLINIC | Age: 29
End: 2019-08-29
Attending: ADVANCED PRACTICE MIDWIFE
Payer: COMMERCIAL

## 2019-08-29 VITALS
SYSTOLIC BLOOD PRESSURE: 84 MMHG | HEIGHT: 60 IN | HEART RATE: 64 BPM | DIASTOLIC BLOOD PRESSURE: 50 MMHG | BODY MASS INDEX: 23.16 KG/M2 | WEIGHT: 118 LBS

## 2019-08-29 DIAGNOSIS — O02.1 MISSED ABORTION: ICD-10-CM

## 2019-08-29 DIAGNOSIS — O02.1 MISSED ABORTION: Primary | ICD-10-CM

## 2019-08-29 PROCEDURE — 76817 TRANSVAGINAL US OBSTETRIC: CPT | Performed by: OBSTETRICS & GYNECOLOGY

## 2019-08-29 PROCEDURE — 99214 OFFICE O/P EST MOD 30 MIN: CPT | Performed by: OBSTETRICS & GYNECOLOGY

## 2019-08-29 PROCEDURE — 76801 OB US < 14 WKS SINGLE FETUS: CPT | Performed by: OBSTETRICS & GYNECOLOGY

## 2019-08-29 RX ORDER — HYDROCODONE BITARTRATE AND ACETAMINOPHEN 5; 325 MG/1; MG/1
1 TABLET ORAL EVERY 6 HOURS PRN
Qty: 10 TABLET | Refills: 0 | Status: SHIPPED | OUTPATIENT
Start: 2019-08-29 | End: 2019-09-30

## 2019-08-29 RX ORDER — MISOPROSTOL 200 UG/1
800 TABLET ORAL ONCE
Qty: 4 TABLET | Refills: 1 | Status: SHIPPED | OUTPATIENT
Start: 2019-08-29 | End: 2019-09-30

## 2019-08-29 ASSESSMENT — MIFFLIN-ST. JEOR: SCORE: 1187.77

## 2019-08-29 NOTE — PROGRESS NOTES
SUBJECTIVE:                                                   Marisel Perry is a 29 year old female who presents to clinic today for newly diagnosed missed  measuring 6w3d by ultrasound.  Prior US a week ago showed the same findings.     Reassured her that the loss could not have been stopped by her actions or any other persons actions. She has not been experiencing any bleeding, pain or cramping.     Conception was via Femara induction of ovulation due to PCOS.      Problem list and histories reviewed & adjusted, as indicated.  Additional history: as documented.    There is no problem list on file for this patient.    Past Surgical History:   Procedure Laterality Date     NO HISTORY OF SURGERY  2019      Social History     Tobacco Use     Smoking status: Never Smoker     Smokeless tobacco: Never Used   Substance Use Topics     Alcohol use: No     Frequency: Never      Problem (# of Occurrences) Relation (Name,Age of Onset)    Diabetes Type 2  (1) Father              Current Outpatient Medications on File Prior to Visit:  levothyroxine (SYNTHROID/LEVOTHROID) 50 MCG tablet Take 1 tablet (50 mcg) by mouth daily   Prenatal Vit-Fe Fumarate-FA (PRENATAL MULTIVITAMIN W/IRON) 27-0.8 MG tablet Take 1 tablet by mouth daily     No current facility-administered medications on file prior to visit.   No Known Allergies    ROS:  5 point ROS negative except as noted above in HPI, including Gen., Resp., CV, GI &  system review.    OBJECTIVE:     Exam:  Constitutional:  Appearance: Well nourished, well developed alert, in no acute distress  Neurologic/Psychiatric:  Mental Status:  Oriented X3     In-Clinic Test Results:  No results found for this or any previous visit (from the past 24 hour(s)).    ASSESSMENT/PLAN:                                                        ICD-10-CM    1. Missed  O02.1 HYDROcodone-acetaminophen (NORCO) 5-325 MG tablet       Discussed miscarriages and expressed condolences.  We discussed causes of miscarriage including chromosome abnormalities.  She understands that nothing can be done to prevent a miscarriage from occuring.     Discussed management options:     1. Expectant management. Discussed that in the absence of signs of infection, waiting 2-4 weeks is a reasonable option and that up to 80% of missed abortions will spontaneously pass by 8 weeks. Limited data that there are higher success rates in symptomatic women. Discussed unpredictable timing. Discussed what to expect in terms of painful cramping and 2 hours of heavy bleeding, passing tissue and clots. Recommend another adult is home with her when she is bleeding and miscarrying. Counseled patient to seek medical attention if heavy bleeding persists beyond 2 hours, if severe pain, if fever/chills or foul smelling discharge, if syncopal or concern for hemorrhaging.  2. Medical management with misoprostol. Recommend 800mcg vaginally. May repeat 12 hours later.  Discussed that misoprostol helps speed up the process and makes timing more predictable. Discussed that about 80% of women will complete miscarriage within 24 hours.   3. Surgical management. Offered and discussed suction dilation and curettage procedure. Counseled patient that surgical evacuation results in faster and more predictable complete evacuation compared to expectant and medical management. Discussed that is an outpatient procedure, is associated with less blood loss than other 2 options, is more predictable in terms of planning, physically will be the most comfortable but does have risks of uterine perforation, cervical laceration, Asherman's syndrome. Plan to administer a single 200-mg dose of doxycycline 1 hour before surgical management of early pregnancy loss to prevent postoperative infection. Discussed that pregnancy tissue would be evaluated by pathology, then could be released to a  home if she wishes. Discussed potential cytogenetic evaluation  if D&C but encouraged her to contact her insurance to see if it would be covered. Discussed that this could be scheduled, usually within a few days if she desires this option. Discussed that suction D&C may need to be performed if incomplete  or if excessive bleeding with either expectant management or misoprostol.     Patient desires medical management.     Blood type: A pos. Rhogam is not indicated.    Contraception plan: none.     The patient will return in 2 weeks to see me. We will check HCG at that time. She is interested in restarting Femara as soon as possible. Discussed if no menses in 4-6 weeks after miscarriage and HCG is normal, would use progesterone withdrawal bleed and then restart Femara.    Over 50% of this 25 minute appointment was spent in counseling regarding missed  and its management.      Miladis Fisher MD  [facility]

## 2019-08-29 NOTE — NURSING NOTE
"Chief Complaint   Patient presents with     Prenatal Care     Follow up after OB US this afternoon in [facility].        Initial BP (!) 84/50   Pulse 64   Ht 1.518 m (4' 11.75\")   Wt 53.5 kg (118 lb)   LMP 2019   Breastfeeding? No   BMI 23.24 kg/m   Estimated body mass index is 23.24 kg/m  as calculated from the following:    Height as of this encounter: 1.518 m (4' 11.75\").    Weight as of this encounter: 53.5 kg (118 lb).  BP completed using cuff size: regular    Questioned patient about current smoking habits.  Pt. has never smoked.          The following HM Due: NONE      The following patient reported/Care Every where data was sent to:  Beatriz Galloway LPN             "

## 2019-09-02 ENCOUNTER — NURSE TRIAGE (OUTPATIENT)
Dept: NURSING | Facility: CLINIC | Age: 29
End: 2019-09-02

## 2019-09-02 NOTE — TELEPHONE ENCOUNTER
Clinic Action Needed:No  Reason for Call: Marisel was prescribed Cytotec for a missed , about 10 weeks and she took her 4 tablets vaginally last night.  She cramped, passed some clots, but felt like she didn't bleed at all.  She is asking if she should take 2nd round of Cytotec.    Paged on call provider for [facility] to speak to caller @ [phone].  Dr. Ibarra is on call, page sent @ 2:58 pm via smart web.    Caller advised to call back if they have not heard back from on call provider within 20 minutes.  Caller appears to understand directives and agrees with plan.    Routed to: Not routed.    Bri Zavala RN  [facility]

## 2019-09-10 ENCOUNTER — OFFICE VISIT (OUTPATIENT)
Dept: OBGYN | Facility: CLINIC | Age: 29
End: 2019-09-10
Payer: COMMERCIAL

## 2019-09-10 VITALS — SYSTOLIC BLOOD PRESSURE: 86 MMHG | BODY MASS INDEX: 22.65 KG/M2 | WEIGHT: 115 LBS | DIASTOLIC BLOOD PRESSURE: 50 MMHG

## 2019-09-10 DIAGNOSIS — Z31.9 PATIENT DESIRES PREGNANCY: ICD-10-CM

## 2019-09-10 DIAGNOSIS — O03.9 MISCARRIAGE: Primary | ICD-10-CM

## 2019-09-10 PROCEDURE — 36415 COLL VENOUS BLD VENIPUNCTURE: CPT | Performed by: OBSTETRICS & GYNECOLOGY

## 2019-09-10 PROCEDURE — 84702 CHORIONIC GONADOTROPIN TEST: CPT | Performed by: OBSTETRICS & GYNECOLOGY

## 2019-09-10 PROCEDURE — 99213 OFFICE O/P EST LOW 20 MIN: CPT | Performed by: OBSTETRICS & GYNECOLOGY

## 2019-09-10 RX ORDER — PRENATAL VIT/IRON FUM/FOLIC AC 27MG-0.8MG
1 TABLET ORAL DAILY
Qty: 90 TABLET | Refills: 3 | Status: ON HOLD | OUTPATIENT
Start: 2019-09-10 | End: 2019-11-20

## 2019-09-10 NOTE — PROGRESS NOTES
SUBJECTIVE:                                                   Marisel Perry is a 29 year old female who presents to clinic today to follow up for missed , treated with cytotec. Please see my last note for complete details.    She used 2 doses of medication and experienced bleeding on days 1-2. No bleeding now.    Plan is to attempt conception again in 1-2 months, so will check quantitative HCG today.      Problem list and histories reviewed & adjusted, as indicated.  Additional history: as documented.    There is no problem list on file for this patient.    Past Surgical History:   Procedure Laterality Date     NO HISTORY OF SURGERY  2019      Social History     Tobacco Use     Smoking status: Never Smoker     Smokeless tobacco: Never Used   Substance Use Topics     Alcohol use: No     Frequency: Never      Problem (# of Occurrences) Relation (Name,Age of Onset)    Diabetes Type 2  (1) Father              Current Outpatient Medications on File Prior to Visit:  [] HYDROcodone-acetaminophen (NORCO) 5-325 MG tablet Take 1 tablet by mouth every 6 hours as needed for severe pain   levothyroxine (SYNTHROID/LEVOTHROID) 50 MCG tablet Take 1 tablet (50 mcg) by mouth daily (Patient not taking: Reported on 9/10/2019)   [] misoprostol (CYTOTEC) 200 MCG tablet Place 4 tablets (800 mcg) vaginally once for 1 dose     No current facility-administered medications on file prior to visit.   No Known Allergies    ROS:  5 point ROS negative except as noted above in HPI, including Gen., Resp., CV, GI &  system review.    OBJECTIVE:     No exam was necessary today as this was entirely a counseling appointment.    In-Clinic Test Results:  No results found for this or any previous visit (from the past 24 hour(s)).    ASSESSMENT/PLAN:                                                        ICD-10-CM    1. Miscarriage O03.9 HCG quantitative pregnancy   2. Patient desires pregnancy Z31.9 Prenatal Vit-Fe  Fumarate-FA (PRENATAL MULTIVITAMIN W/IRON) 27-0.8 MG tablet         Discussed waiting for one normal cycle or, as she has a history of irregular cycles, progesterone withdrawal bleed if she has not had a normal cycle after HCG is negative. Prenatal vitamin prescription today per her request. She will have a quantitative HCG following this appointment. Will plan follow up US in 3-4 weeks to recheck ovaries.    Miladis Fisher MD  [facility]

## 2019-09-10 NOTE — NURSING NOTE
"Chief Complaint   Patient presents with     Prenatal Care     Follow Up       Initial BP (!) 86/50 (BP Location: Right arm, Cuff Size: Adult Regular)   Wt 52.2 kg (115 lb)   LMP 2019   BMI 22.65 kg/m   Estimated body mass index is 22.65 kg/m  as calculated from the following:    Height as of 19: 1.518 m (4' 11.75\").    Weight as of this encounter: 52.2 kg (115 lb).  BP completed using cuff size: regular    Questioned patient about current smoking habits.  Pt. has never smoked.          Colin Garg, LEATHA             "

## 2019-09-11 DIAGNOSIS — O03.9 MISCARRIAGE: Primary | ICD-10-CM

## 2019-09-11 LAB — B-HCG SERPL-ACNC: 503 IU/L (ref 0–5)

## 2019-09-19 DIAGNOSIS — O03.9 MISCARRIAGE: ICD-10-CM

## 2019-09-19 DIAGNOSIS — O03.9 MISCARRIAGE: Primary | ICD-10-CM

## 2019-09-19 LAB — B-HCG SERPL-ACNC: 44 IU/L (ref 0–5)

## 2019-09-19 PROCEDURE — 36415 COLL VENOUS BLD VENIPUNCTURE: CPT | Performed by: OBSTETRICS & GYNECOLOGY

## 2019-09-19 PROCEDURE — 84702 CHORIONIC GONADOTROPIN TEST: CPT | Performed by: OBSTETRICS & GYNECOLOGY

## 2019-09-26 DIAGNOSIS — O03.9 MISCARRIAGE: Primary | ICD-10-CM

## 2019-09-26 DIAGNOSIS — O03.9 MISCARRIAGE: ICD-10-CM

## 2019-09-26 LAB — B-HCG SERPL-ACNC: 15 IU/L (ref 0–5)

## 2019-09-26 PROCEDURE — 84702 CHORIONIC GONADOTROPIN TEST: CPT | Performed by: OBSTETRICS & GYNECOLOGY

## 2019-09-26 PROCEDURE — 36415 COLL VENOUS BLD VENIPUNCTURE: CPT | Performed by: OBSTETRICS & GYNECOLOGY

## 2019-09-30 ENCOUNTER — HOSPITAL ENCOUNTER (EMERGENCY)
Facility: CLINIC | Age: 29
Discharge: HOME | End: 2019-09-30
Attending: EMERGENCY MEDICINE | Admitting: EMERGENCY MEDICINE
Payer: COMMERCIAL

## 2019-09-30 VITALS
DIASTOLIC BLOOD PRESSURE: 71 MMHG | RESPIRATION RATE: 16 BRPM | TEMPERATURE: 98.5 F | HEART RATE: 69 BPM | OXYGEN SATURATION: 100 % | WEIGHT: 116 LBS | BODY MASS INDEX: 22.84 KG/M2 | SYSTOLIC BLOOD PRESSURE: 112 MMHG

## 2019-09-30 DIAGNOSIS — K21.00 GASTROESOPHAGEAL REFLUX DISEASE WITH ESOPHAGITIS: ICD-10-CM

## 2019-09-30 DIAGNOSIS — F41.9 ANXIETY: ICD-10-CM

## 2019-09-30 LAB — INTERPRETATION ECG - MUSE: NORMAL

## 2019-09-30 PROCEDURE — 25000125 ZZHC RX 250: Performed by: EMERGENCY MEDICINE

## 2019-09-30 PROCEDURE — 99283 EMERGENCY DEPT VISIT LOW MDM: CPT

## 2019-09-30 PROCEDURE — 93005 ELECTROCARDIOGRAM TRACING: CPT

## 2019-09-30 PROCEDURE — 25000132 ZZH RX MED GY IP 250 OP 250 PS 637: Performed by: EMERGENCY MEDICINE

## 2019-09-30 RX ADMIN — LIDOCAINE HYDROCHLORIDE 30 ML: 20 SOLUTION ORAL; TOPICAL at 02:50

## 2019-09-30 ASSESSMENT — ENCOUNTER SYMPTOMS: CHEST TIGHTNESS: 1

## 2019-09-30 NOTE — ED AVS SNAPSHOT
[facility] Emergency Department  [address]  [facility] [address]  Phone:  [phone]  Fax:  [phone]                                    Marisel Perry   MRN: [id number]    Department:  [facility] Emergency Department   Date of Visit:  9/30/2019           After Visit Summary Signature Page    I have received my discharge instructions, and my questions have been answered. I have discussed any challenges I see with this plan with the nurse or doctor.    ..........................................................................................................................................  Patient/Patient Representative Signature      ..........................................................................................................................................  Patient Representative Print Name and Relationship to Patient    ..................................................               ................................................  Date                                   Time    ..........................................................................................................................................  Reviewed by Signature/Title    ...................................................              ..............................................  Date                                               Time          22EPIC Rev 08/18

## 2019-09-30 NOTE — ED PROVIDER NOTES
"  History     Chief Complaint:  Chest Pressure    HPI   Marisel Perry is a 29 year old female who presents with chest pressure. The patient states that on Friday she had symptoms of acid reflux and was burping a lot, she took Tums and Zantac with relief. Today, the patient states that she had eaten dinner at around 2200 and went to bed around midnight. However, she woke up feeling like something was \"stuck in her throat\" and that she \"needs to burp.\" The patient made herself vomit without any relief. She then endorses getting very scared and worried that something more serious was going on.    Allergies:  NKDA    Medications:    Levothyroxine    Past Medical History:    Hypothyroidism  Primary female infertility    Past Surgical History:    The patient does not have any pertinent past surgical history.    Family History:    Type 2 Diabetes    Social History:  The patient was accompanied to the ED by family.  Smoking Status: Negative  Smokeless Tobacco: Negative  Alcohol Use: Negative  Drug Use: Negative  Marital Status:       Review of Systems   Respiratory: Positive for chest tightness.    All other systems reviewed and are negative.      Physical Exam     Patient Vitals for the past 24 hrs:   BP Temp Temp src Pulse Heart Rate Resp SpO2 Weight   09/30/19 0300 112/71 -- -- 69 -- -- 100 % --   09/30/19 0226 116/77 98.5  F (36.9  C) Oral -- 100 16 100 % 52.6 kg (116 lb)       Physical Exam  Nursing note and vitals reviewed.  Constitutional: Cooperative.   HENT:   Mouth/Throat: Mucous membranes are normal.   Cardiovascular: Normal rate, regular rhythm and normal heart sounds.  No murmur.  Pulmonary/Chest: Effort normal and breath sounds normal. No respiratory distress. No wheezes. No rales.   Abdominal: Soft. Normal appearance and bowel sounds are normal. No distension. There is no tenderness. There is no rigidity and no guarding.   Musculoskeletal: Normal range of motion.   Neurological: Alert. Oriented " x4  Skin: Skin is warm and dry.  Psychiatric: Anxious appearing.     Emergency Department Course   ECG:  Indication: Chest Pressure  Time: 0235  Vent. Rate 82 bpm. MI interval 128. QRS duration 78. QT/QTc 386/450. P-R-T axis 48 64 24.  Sinus rhythm with marked sinus arrhythmia. Read time: 0238    Interventions:  0250 GI Cocktail (Maalox/Mylanta and viscous Lidocaine), 30 mL suspension, PO     Emergency Department Course:  Nursing notes and vitals reviewed. (0243) I performed an exam of the patient as documented above.      Medicine administered as documented above.      (0330) I rechecked the patient and discussed the results of her workup thus far.      Findings and plan explained to the Patient. Patient discharged home with instructions regarding supportive care, medications, and reasons to return. The importance of close follow-up was reviewed.    I personally answered all related questions prior to discharge.     Impression & Plan    Medical Decision Making:  Marisel Perry is a 29 year old female who presents with reflux symptoms as well as mild anxiety. She has the sensation that there is something stuck in her esophageus. She is tolerating her secretions with no drooling or spitting and was able to swallow a GI cocktail without difficulty. Screening EKG performed is normal. I have a very low clinical suspicion for cardiac or pulmonary etiologies. Her abdominal exam is benign without concern for pancreatitis, gall bladder pathology, ruptured gastric ulcer, etc. I suspect mild gastritis versus GERD versus peptic ulcer disease. She is already on Zantac but has only been on it for several days. I counseled that this medication will take time to be affective as the tissue needs time to heal. No indication for emergent GI consultation or further evaluation. She is markedly anxious which is contributing to her presentation. We have talked at length about this. Stable for discharge at this time.      Diagnosis:    ICD-10-CM    1. Gastroesophageal reflux disease with esophagitis K21.0    2. Anxiety F41.9      Disposition:  discharged to home    Scribe Disclosure:  I, Shena Aleman, am serving as a scribe on 9/30/2019 at 2:32 AM to personally document services performed by Matteo Gambino MD based on my observations and the provider's statements to me.     Shena Aleman  9/30/2019   [facility] EMERGENCY DEPARTMENT       Matteo Gambino MD  09/30/19 4190

## 2019-09-30 NOTE — ED TRIAGE NOTES
"Pt reports that approx 0030 developed chest pressure that feels like \"I need to burp.\" Pt took zantac OTC and states it helped mildly but unrelieved. Also attempted to vomit without relief.   States also had similar symptoms on Friday that were relieved by OTC meds.  Pt denies pain.  ABCs intact A&Ox4.  "

## 2019-10-02 ENCOUNTER — OFFICE VISIT (OUTPATIENT)
Dept: FAMILY MEDICINE | Facility: CLINIC | Age: 29
End: 2019-10-02
Payer: COMMERCIAL

## 2019-10-02 VITALS
HEART RATE: 66 BPM | OXYGEN SATURATION: 100 % | SYSTOLIC BLOOD PRESSURE: 92 MMHG | WEIGHT: 116 LBS | DIASTOLIC BLOOD PRESSURE: 60 MMHG | HEIGHT: 60 IN | BODY MASS INDEX: 22.78 KG/M2

## 2019-10-02 DIAGNOSIS — Z87.59 HISTORY OF MISCARRIAGE: ICD-10-CM

## 2019-10-02 DIAGNOSIS — K21.9 GASTROESOPHAGEAL REFLUX DISEASE, ESOPHAGITIS PRESENCE NOT SPECIFIED: Primary | ICD-10-CM

## 2019-10-02 DIAGNOSIS — E03.9 ACQUIRED HYPOTHYROIDISM: ICD-10-CM

## 2019-10-02 DIAGNOSIS — F41.0 PANIC ATTACK: ICD-10-CM

## 2019-10-02 PROBLEM — N97.9 FEMALE INFERTILITY: Status: ACTIVE | Noted: 2019-10-02

## 2019-10-02 PROCEDURE — 99203 OFFICE O/P NEW LOW 30 MIN: CPT | Performed by: INTERNAL MEDICINE

## 2019-10-02 RX ORDER — LORAZEPAM 0.5 MG/1
0.5 TABLET ORAL DAILY PRN
Qty: 2 TABLET | Refills: 0 | Status: ON HOLD | OUTPATIENT
Start: 2019-10-02 | End: 2019-11-20

## 2019-10-02 RX ORDER — LORAZEPAM 0.5 MG/1
0.5 TABLET ORAL DAILY PRN
Qty: 2 TABLET | Refills: 0 | Status: SHIPPED | OUTPATIENT
Start: 2019-10-02 | End: 2019-10-02

## 2019-10-02 RX ORDER — PANTOPRAZOLE SODIUM 20 MG/1
20 TABLET, DELAYED RELEASE ORAL DAILY
Qty: 30 TABLET | Refills: 1 | Status: ON HOLD | OUTPATIENT
Start: 2019-10-02 | End: 2019-11-20

## 2019-10-02 ASSESSMENT — MIFFLIN-ST. JEOR: SCORE: 1175.48

## 2019-10-02 NOTE — PATIENT INSTRUCTIONS
Patient Education     GERD (Adult)    The esophagus is a tube that carries food from the mouth to the stomach. A valve (the LES, lower esophageal sphincter) at the lower end of the esophagus prevents stomach acid from flowing upward. When this valve doesn't work properly, stomach contents may repeatedly flow back up (reflux) into the esophagus. This is called gastroesophageal reflux disease (GERD). GERD can irritate the esophagus. It can cause problems with pain, swallowing or breathing. In severe cases, GERD can cause recurrent pneumonia (from aspiration or breathing in particles) or other serious problems.  Symptoms of reflux include burning, pressure or sharp pain in the upper abdomen or mid to lower chest. The pain can spread to the neck, back, or shoulder. There may be belching, an acid taste in the back of the throat, chronic cough, or sore throat, or hoarseness. GERD symptoms often occur during the day after a big meal. They can also occur at night when lying down.   Home care  Lifestyle changes can help reduce symptoms. If needed, your healthcare provider may prescribe medicines. Symptoms often improve with treatment, but if treatment is stopped, the symptoms often return after a few months. So most persons with GERD will need to continue treatment or get treatment on and off.  Lifestyle changes    Limit or avoid fatty, fried, and spicy foods, as well as coffee, chocolate, mint, and foods with high acid content such as tomatoes and citrus fruit and juices (orange, grapefruit, lemon).    Don t eat large meals, especially at night. Frequent, smaller meals are best. Don't lie down right after eating. And don t eat anything 3 hours before going to bed.    Don't drink alcohol or smoke. As much as possible, stay away from second hand smoke.    If you are overweight, losing weight will reduce symptoms.     Don't wear tight clothing around your stomach area.    If your symptoms occur during sleep, use a foam wedge  "to elevate your upper body (not just your head.) Or, place 4\" blocks under the head of your bed. Or use 2 bed risers under your bedframe.  Medicines  If needed, medicines can help relieve the symptoms of GERD and prevent damage to the esophagus. Discuss a medicine plan with your healthcare provider. This may include one or more of the following medicines:    Antacids to help neutralize the normal acids in your stomach.    Acid blockers (Histamine or H2 blockers) to decrease acid production.    Acid inhibitors (proton pump inhibitors PPIs) to decrease acid production in a different way than the blockers. They may work better, but can take a little longer to take effect.  Take an antacid 30 to 60 minutes after eating and at bedtime, but not at the same time as an acid blocker.Try not to take medicines such as ibuprofen and aspirin. If you are taking aspirin for your heart or other medical reasons, talk to your healthcare provider about stopping it.  Follow-up care  Follow up with your healthcare provider or as advised by our staff.  When to seek medical advice  Call your healthcare provider if any of the following occur:    Stomach pain gets worse or moves to the lower right abdomen (appendix area)    Chest pain appears or gets worse, or spreads to the back, neck, shoulder, or arm    An over-the-counter trial of medicine doesn't relieve your symptoms    Weight loss that can't be explained    Trouble or pain swallowing    Frequent vomiting (can t keep down liquids)    Blood in the stool or vomit (red or black in color)    Feeling weak or dizzy    Fever of 100.4 F (38 C) or higher, or as directed by your healthcare provider  Date Last Reviewed: 3/1/2018    3061-8434 [employer]. [address] [place] [address], [place] [address]. All rights reserved. This information is not intended as a substitute for professional medical care. Always follow your healthcare professional's instructions.           "

## 2019-10-02 NOTE — PROGRESS NOTES
"Subjective     Marisel Momo Perry is a 29 year old female who presents to clinic today for the following health issues:    HPI   ED/UC Followup:    Facility:  [facility]   Date of visit: 9/30/2019  Reason for visit: chest pressure   Current Status: pt still having chest pressure      Marisel was seen recently in the ER for epigastric pain and acid reflux. She does not have a history of acid reflux but did have a miscarriage about a month ago. She has a sensation that something is stuck in the middle of her chest. Nauseated.    Had a panic attack at the same time. Not sure if it was from the pain she was experiencing? Her hands and feet went numb. This is what prompted her  to take her to the ER.     Patient Active Problem List   Diagnosis     Female infertility     History of miscarriage     Past Surgical History:   Procedure Laterality Date     NO HISTORY OF SURGERY  01/2019       Social History     Tobacco Use     Smoking status: Never Smoker     Smokeless tobacco: Never Used   Substance Use Topics     Alcohol use: No     Frequency: Never     Family History   Problem Relation Age of Onset     Diabetes Type 2  Father            Reviewed and updated as needed this visit by Provider         Review of Systems   ROS COMP: Constitutional, HEENT, cardiovascular, pulmonary, gi and gu systems are negative, except as otherwise noted.      Objective    BP 92/60   Pulse 66   Ht 1.513 m (4' 11.55\")   Wt 52.6 kg (116 lb)   LMP 06/22/2019   SpO2 100%   Breastfeeding? Unknown   BMI 23.00 kg/m    There is no height or weight on file to calculate BMI.  Physical Exam   GENERAL: healthy, alert and no distress  RESP: lungs clear to auscultation - no rales, rhonchi or wheezes  CV: regular rate and rhythm, normal S1 S2, no S3 or S4, no murmur, click or rub, no peripheral edema and peripheral pulses strong  ABDOMEN: soft, nontender, no hepatosplenomegaly, no masses and bowel sounds normal  PSYCH: mentation appears " normal, affect normal/bright    Diagnostic Test Results:  Labs reviewed in Epic        Assessment & Plan     1. Gastroesophageal reflux disease, esophagitis presence not specified  Recommending a daily PPI for at least 4 weeks. No red flag symptoms so I don't think an H.Pylori test or EGD is necessary at this time. If no improvement in symptoms will plan for these.   - pantoprazole (PROTONIX) 20 MG EC tablet; Take 1 tablet (20 mg) by mouth daily  Dispense: 30 tablet; Refill: 1  - Basic metabolic panel; Future    2. Panic attack  Recent emotional situation with her miscarriage then followed by intense pain. Described treatment with benzodiazepines. Will give 2 tablets for use if this recurs.   - LORazepam (ATIVAN) 0.5 MG tablet; Take 1 tablet (0.5 mg) by mouth daily as needed for anxiety  Dispense: 2 tablet; Refill: 0    3. Acquired hypothyroidism  - TSH with free T4 reflex; Future    4. History of miscarriage         Return in about 4 weeks (around 10/30/2019), or if symptoms worsen or fail to improve.    Jamaica Carias MD  [facility] [place]

## 2019-10-10 ENCOUNTER — TELEPHONE (OUTPATIENT)
Dept: OBGYN | Facility: CLINIC | Age: 29
End: 2019-10-10

## 2019-10-10 ENCOUNTER — MYC MEDICAL ADVICE (OUTPATIENT)
Dept: OBGYN | Facility: CLINIC | Age: 29
End: 2019-10-10

## 2019-10-10 DIAGNOSIS — O03.9 MISCARRIAGE: ICD-10-CM

## 2019-10-10 LAB — B-HCG SERPL-ACNC: 5 IU/L (ref 0–5)

## 2019-10-10 PROCEDURE — 84702 CHORIONIC GONADOTROPIN TEST: CPT | Performed by: OBSTETRICS & GYNECOLOGY

## 2019-10-10 PROCEDURE — 36415 COLL VENOUS BLD VENIPUNCTURE: CPT | Performed by: OBSTETRICS & GYNECOLOGY

## 2019-10-10 NOTE — TELEPHONE ENCOUNTER
I would wait 4 weeks for it to start on its own, since the HCG level is just now down to where we would even expect a period. If no period in 4 weeks, can use med.    Miladis Fisher MD

## 2019-10-10 NOTE — TELEPHONE ENCOUNTER
Reason for call:  Other   Patient called regarding (reason for call): call back  Additional comments: pt called wanted to speak to OB nurse. Please call pt back     Phone number to reach patient:  Home number on file [phone] (home)    Best Time:  asap    Can we leave a detailed message on this number?  YES

## 2019-10-10 NOTE — TELEPHONE ENCOUNTER
Spoke with patient. Informed of HCG results.  See my chart message.  Patient requesting medication to help induce a period.  Cycles are irregular.   Has med at home and wants to know if its is ok to start this now or does she need to wait and if so how long.  Component      Latest Ref Rng & Units 7/22/2019 8/21/2019 9/10/2019 9/19/2019   HCG Quantitative Serum      0 - 5 IU/L 991 (H) 69,540 (H) 503 (H) 44 (H)     Component      Latest Ref Rng & Units 9/26/2019 10/10/2019   HCG Quantitative Serum      0 - 5 IU/L 15 (H) 5     Please advise    Pinky Nolan RN

## 2019-10-15 ENCOUNTER — MYC MEDICAL ADVICE (OUTPATIENT)
Dept: FAMILY MEDICINE | Facility: CLINIC | Age: 29
End: 2019-10-15

## 2019-10-15 DIAGNOSIS — K21.9 GASTROESOPHAGEAL REFLUX DISEASE, ESOPHAGITIS PRESENCE NOT SPECIFIED: Primary | ICD-10-CM

## 2019-10-22 NOTE — TELEPHONE ENCOUNTER
Marisel has a few questions about the [employer] message. Please give her a call back when possible. Thank you!    [phone] ok to LM

## 2019-10-22 NOTE — TELEPHONE ENCOUNTER
A barium swallow would not be helpful in this situation.    Let's start with checking H.Pylori. This would be an easily treatable and identifiable cause for her symptoms. Orders placed so she can come in for a lab appointment.     If this is negative then I will refer her to GI.

## 2019-10-22 NOTE — TELEPHONE ENCOUNTER
Spoke with patient, states she called her insurance company about the endoscopy at the cost to her would be over $3700. She states insurance company had advised testing like barium swallow or other imaging which may be more cost effective. If patient needs to go to GI she is willing to do that as well. Please advise.     Haylie Fuentes RN   [facility] - Triage

## 2019-10-22 NOTE — TELEPHONE ENCOUNTER
Informed of below via [employer].   Haylie Fuentes RN   [facility] - Triage

## 2019-10-23 DIAGNOSIS — K21.9 GASTROESOPHAGEAL REFLUX DISEASE, ESOPHAGITIS PRESENCE NOT SPECIFIED: ICD-10-CM

## 2019-10-23 PROCEDURE — 87338 HPYLORI STOOL AG IA: CPT | Performed by: INTERNAL MEDICINE

## 2019-10-25 DIAGNOSIS — A04.8 H. PYLORI INFECTION: Primary | ICD-10-CM

## 2019-10-25 LAB — H PYLORI AG STL QL IA: POSITIVE

## 2019-10-25 RX ORDER — AMOXICILLIN 500 MG/1
500 CAPSULE ORAL 2 TIMES DAILY
Qty: 28 CAPSULE | Refills: 0 | Status: ON HOLD | OUTPATIENT
Start: 2019-10-25 | End: 2019-11-20

## 2019-10-25 RX ORDER — CLARITHROMYCIN 500 MG
500 TABLET ORAL 2 TIMES DAILY
Qty: 28 TABLET | Refills: 0 | Status: ON HOLD | OUTPATIENT
Start: 2019-10-25 | End: 2019-11-20

## 2019-11-07 ENCOUNTER — TRANSFERRED RECORDS (OUTPATIENT)
Dept: HEALTH INFORMATION MANAGEMENT | Facility: CLINIC | Age: 29
End: 2019-11-07

## 2019-11-20 ENCOUNTER — HOSPITAL ENCOUNTER (OUTPATIENT)
Facility: CLINIC | Age: 29
Discharge: HOME | End: 2019-11-20
Attending: INTERNAL MEDICINE | Admitting: INTERNAL MEDICINE
Payer: COMMERCIAL

## 2019-11-20 VITALS
DIASTOLIC BLOOD PRESSURE: 76 MMHG | HEART RATE: 56 BPM | SYSTOLIC BLOOD PRESSURE: 104 MMHG | RESPIRATION RATE: 13 BRPM | OXYGEN SATURATION: 99 %

## 2019-11-20 LAB — UPPER GI ENDOSCOPY: NORMAL

## 2019-11-20 PROCEDURE — 88305 TISSUE EXAM BY PATHOLOGIST: CPT | Mod: 26 | Performed by: INTERNAL MEDICINE

## 2019-11-20 PROCEDURE — 40000104 ZZH STATISTIC MODERATE SEDATION < 10 MIN: Performed by: INTERNAL MEDICINE

## 2019-11-20 PROCEDURE — 25000128 H RX IP 250 OP 636: Performed by: INTERNAL MEDICINE

## 2019-11-20 PROCEDURE — 88305 TISSUE EXAM BY PATHOLOGIST: CPT | Performed by: INTERNAL MEDICINE

## 2019-11-20 PROCEDURE — 43239 EGD BIOPSY SINGLE/MULTIPLE: CPT | Performed by: INTERNAL MEDICINE

## 2019-11-20 RX ORDER — FLUMAZENIL 0.1 MG/ML
0.2 INJECTION, SOLUTION INTRAVENOUS
Status: DISCONTINUED | OUTPATIENT
Start: 2019-11-20 | End: 2019-11-20 | Stop reason: HOSPADM

## 2019-11-20 RX ORDER — ONDANSETRON 2 MG/ML
4 INJECTION INTRAMUSCULAR; INTRAVENOUS EVERY 6 HOURS PRN
Status: DISCONTINUED | OUTPATIENT
Start: 2019-11-20 | End: 2019-11-20 | Stop reason: HOSPADM

## 2019-11-20 RX ORDER — FENTANYL CITRATE 50 UG/ML
INJECTION, SOLUTION INTRAMUSCULAR; INTRAVENOUS PRN
Status: DISCONTINUED | OUTPATIENT
Start: 2019-11-20 | End: 2019-11-20 | Stop reason: HOSPADM

## 2019-11-20 RX ORDER — ONDANSETRON 2 MG/ML
4 INJECTION INTRAMUSCULAR; INTRAVENOUS
Status: DISCONTINUED | OUTPATIENT
Start: 2019-11-20 | End: 2019-11-20 | Stop reason: HOSPADM

## 2019-11-20 RX ORDER — LIDOCAINE 40 MG/G
CREAM TOPICAL
Status: DISCONTINUED | OUTPATIENT
Start: 2019-11-20 | End: 2019-11-20 | Stop reason: HOSPADM

## 2019-11-20 RX ORDER — NALOXONE HYDROCHLORIDE 0.4 MG/ML
.1-.4 INJECTION, SOLUTION INTRAMUSCULAR; INTRAVENOUS; SUBCUTANEOUS
Status: DISCONTINUED | OUTPATIENT
Start: 2019-11-20 | End: 2019-11-20 | Stop reason: HOSPADM

## 2019-11-20 RX ORDER — ONDANSETRON 4 MG/1
4 TABLET, ORALLY DISINTEGRATING ORAL EVERY 6 HOURS PRN
Status: DISCONTINUED | OUTPATIENT
Start: 2019-11-20 | End: 2019-11-20 | Stop reason: HOSPADM

## 2019-11-22 LAB — COPATH REPORT: NORMAL

## 2019-11-26 ENCOUNTER — MYC MEDICAL ADVICE (OUTPATIENT)
Dept: FAMILY MEDICINE | Facility: CLINIC | Age: 29
End: 2019-11-26

## 2019-11-26 NOTE — TELEPHONE ENCOUNTER
Please see Penem[employer] K [employer] message and advise. Thank you very much.    Jo-Ann Joe RN, BSN  [facility] [place]

## 2019-12-13 ENCOUNTER — MYC MEDICAL ADVICE (OUTPATIENT)
Dept: FAMILY MEDICINE | Facility: CLINIC | Age: 29
End: 2019-12-13

## 2019-12-13 DIAGNOSIS — K21.00 GASTROESOPHAGEAL REFLUX DISEASE WITH ESOPHAGITIS: Primary | ICD-10-CM

## 2019-12-13 NOTE — TELEPHONE ENCOUNTER
Please see [employer] message and advise. Thank you very much.    Jo-Ann Joe RN, BSN  [facility] [place]

## 2019-12-13 NOTE — TELEPHONE ENCOUNTER
Please see [employer] messages x2 and advise. Thank you very much.    Jo-Ann Joe RN, BSN  [facility]

## 2019-12-13 NOTE — TELEPHONE ENCOUNTER
Please see [employer] message and advise. Thank you very much.    Jo-Ann Joe RN, BSN  [facility] [place]

## 2019-12-17 DIAGNOSIS — Z87.59 HISTORY OF MISCARRIAGE: ICD-10-CM

## 2019-12-17 DIAGNOSIS — K21.9 GASTROESOPHAGEAL REFLUX DISEASE, ESOPHAGITIS PRESENCE NOT SPECIFIED: ICD-10-CM

## 2019-12-17 DIAGNOSIS — E03.9 ACQUIRED HYPOTHYROIDISM: ICD-10-CM

## 2019-12-17 DIAGNOSIS — Z32.01 PREGNANCY TEST POSITIVE: ICD-10-CM

## 2019-12-17 PROCEDURE — 84443 ASSAY THYROID STIM HORMONE: CPT | Performed by: OBSTETRICS & GYNECOLOGY

## 2019-12-17 PROCEDURE — 80048 BASIC METABOLIC PNL TOTAL CA: CPT | Performed by: OBSTETRICS & GYNECOLOGY

## 2019-12-17 PROCEDURE — 36415 COLL VENOUS BLD VENIPUNCTURE: CPT | Performed by: OBSTETRICS & GYNECOLOGY

## 2019-12-17 PROCEDURE — 84702 CHORIONIC GONADOTROPIN TEST: CPT | Performed by: OBSTETRICS & GYNECOLOGY

## 2019-12-18 LAB
ANION GAP SERPL CALCULATED.3IONS-SCNC: 6 MMOL/L (ref 3–14)
B-HCG SERPL-ACNC: 2528 IU/L (ref 0–5)
BUN SERPL-MCNC: 7 MG/DL (ref 7–30)
CALCIUM SERPL-MCNC: 8.8 MG/DL (ref 8.5–10.1)
CHLORIDE SERPL-SCNC: 106 MMOL/L (ref 94–109)
CO2 SERPL-SCNC: 24 MMOL/L (ref 20–32)
CREAT SERPL-MCNC: 0.65 MG/DL (ref 0.52–1.04)
GFR SERPL CREATININE-BSD FRML MDRD: >90 ML/MIN/{1.73_M2}
GLUCOSE SERPL-MCNC: 82 MG/DL (ref 70–99)
POTASSIUM SERPL-SCNC: 4.1 MMOL/L (ref 3.4–5.3)
SODIUM SERPL-SCNC: 136 MMOL/L (ref 133–144)
TSH SERPL DL<=0.005 MIU/L-ACNC: 3.85 MU/L (ref 0.4–4)

## 2019-12-19 ENCOUNTER — OFFICE VISIT (OUTPATIENT)
Dept: URGENT CARE | Facility: URGENT CARE | Age: 29
End: 2019-12-19
Payer: COMMERCIAL

## 2019-12-19 VITALS
DIASTOLIC BLOOD PRESSURE: 78 MMHG | SYSTOLIC BLOOD PRESSURE: 110 MMHG | HEART RATE: 78 BPM | RESPIRATION RATE: 20 BRPM | OXYGEN SATURATION: 98 % | TEMPERATURE: 98.2 F

## 2019-12-19 DIAGNOSIS — K21.9 GASTROESOPHAGEAL REFLUX DISEASE WITHOUT ESOPHAGITIS: ICD-10-CM

## 2019-12-19 DIAGNOSIS — Z34.91 FIRST TRIMESTER PREGNANCY: ICD-10-CM

## 2019-12-19 DIAGNOSIS — L50.9 URTICARIA: Primary | ICD-10-CM

## 2019-12-19 DIAGNOSIS — Z87.59 HISTORY OF MISCARRIAGE: ICD-10-CM

## 2019-12-19 DIAGNOSIS — Z32.01 PREGNANCY TEST POSITIVE: ICD-10-CM

## 2019-12-19 LAB
ALBUMIN SERPL-MCNC: 3.7 G/DL (ref 3.4–5)
ALP SERPL-CCNC: 44 U/L (ref 40–150)
ALT SERPL W P-5'-P-CCNC: 24 U/L (ref 0–50)
ANION GAP SERPL CALCULATED.3IONS-SCNC: 9 MMOL/L (ref 3–14)
AST SERPL W P-5'-P-CCNC: 29 U/L (ref 0–45)
BASOPHILS # BLD AUTO: 0 10E9/L (ref 0–0.2)
BASOPHILS NFR BLD AUTO: 0.1 %
BILIRUB SERPL-MCNC: 0.7 MG/DL (ref 0.2–1.3)
BUN SERPL-MCNC: 9 MG/DL (ref 7–30)
CALCIUM SERPL-MCNC: 9.3 MG/DL (ref 8.5–10.1)
CHLORIDE SERPL-SCNC: 101 MMOL/L (ref 94–109)
CO2 SERPL-SCNC: 27 MMOL/L (ref 20–32)
CREAT SERPL-MCNC: 0.8 MG/DL (ref 0.52–1.04)
DIFFERENTIAL METHOD BLD: NORMAL
EOSINOPHIL # BLD AUTO: 0.1 10E9/L (ref 0–0.7)
EOSINOPHIL NFR BLD AUTO: 0.5 %
ERYTHROCYTE [DISTWIDTH] IN BLOOD BY AUTOMATED COUNT: 12.7 % (ref 10–15)
GFR SERPL CREATININE-BSD FRML MDRD: >90 ML/MIN/{1.73_M2}
GLUCOSE SERPL-MCNC: 107 MG/DL (ref 70–99)
HCT VFR BLD AUTO: 37.6 % (ref 35–47)
HGB BLD-MCNC: 13.1 G/DL (ref 11.7–15.7)
LYMPHOCYTES # BLD AUTO: 1.9 10E9/L (ref 0.8–5.3)
LYMPHOCYTES NFR BLD AUTO: 19.8 %
MCH RBC QN AUTO: 29.3 PG (ref 26.5–33)
MCHC RBC AUTO-ENTMCNC: 34.8 G/DL (ref 31.5–36.5)
MCV RBC AUTO: 84 FL (ref 78–100)
MONOCYTES # BLD AUTO: 0.4 10E9/L (ref 0–1.3)
MONOCYTES NFR BLD AUTO: 4.2 %
NEUTROPHILS # BLD AUTO: 7.2 10E9/L (ref 1.6–8.3)
NEUTROPHILS NFR BLD AUTO: 75.4 %
PLATELET # BLD AUTO: 267 10E9/L (ref 150–450)
POTASSIUM SERPL-SCNC: 3.3 MMOL/L (ref 3.4–5.3)
PROT SERPL-MCNC: 7.3 G/DL (ref 6.8–8.8)
RBC # BLD AUTO: 4.47 10E12/L (ref 3.8–5.2)
SODIUM SERPL-SCNC: 137 MMOL/L (ref 133–144)
WBC # BLD AUTO: 9.6 10E9/L (ref 4–11)

## 2019-12-19 PROCEDURE — 80053 COMPREHEN METABOLIC PANEL: CPT | Performed by: PHYSICIAN ASSISTANT

## 2019-12-19 PROCEDURE — 84702 CHORIONIC GONADOTROPIN TEST: CPT | Performed by: OBSTETRICS & GYNECOLOGY

## 2019-12-19 PROCEDURE — 99203 OFFICE O/P NEW LOW 30 MIN: CPT | Performed by: PHYSICIAN ASSISTANT

## 2019-12-19 PROCEDURE — 36415 COLL VENOUS BLD VENIPUNCTURE: CPT | Performed by: PHYSICIAN ASSISTANT

## 2019-12-19 PROCEDURE — 85025 COMPLETE CBC W/AUTO DIFF WBC: CPT | Performed by: PHYSICIAN ASSISTANT

## 2019-12-19 RX ORDER — PRENATAL VIT/IRON FUM/FOLIC AC 27MG-0.8MG
1 TABLET ORAL DAILY
Refills: 3 | COMMUNITY
Start: 2019-09-27 | End: 2023-10-31

## 2019-12-19 RX ORDER — DIPHENHYDRAMINE HCL 25 MG
50 CAPSULE ORAL EVERY 6 HOURS PRN
Qty: 60 CAPSULE | Refills: 0 | Status: SHIPPED | OUTPATIENT
Start: 2019-12-19 | End: 2023-10-31

## 2019-12-19 RX ORDER — CETIRIZINE HYDROCHLORIDE 10 MG/1
10 TABLET ORAL DAILY
Qty: 30 TABLET | Refills: 0 | Status: SHIPPED | OUTPATIENT
Start: 2019-12-19 | End: 2023-10-31

## 2019-12-19 RX ORDER — FAMOTIDINE 10 MG
10 TABLET ORAL 2 TIMES DAILY
Qty: 28 TABLET | Refills: 0 | Status: SHIPPED | OUTPATIENT
Start: 2019-12-19 | End: 2020-01-02

## 2019-12-19 NOTE — PROGRESS NOTES
SUBJECTIVE:  Marisel Perry is a 29 year old female who presents to the clinic today for a rash for 2 days.  Went away with zyrtec on day 1.  Came back.  Took benadryl and zyrtec last night with no improvement.   Itchy hives.    Quality/symptoms of rash: itching   Symptoms are mild and moderate and rash seems to be worsening.  Previous history of a similar rash? No  Recent exposure history: none known    Associated symptoms include: nothing.    Past Medical History:   Diagnosis Date     Hypothyroidism      Primary female infertility      Current Outpatient Medications   Medication Sig Dispense Refill     omeprazole (PRILOSEC) 20 MG DR capsule Take 20 mg by mouth 2 times daily  0     Prenatal Vit-Fe Fumarate-FA (PRENATAL MULTIVITAMIN W/IRON) 27-0.8 MG tablet Take 1 tablet by mouth daily  3     Social History     Tobacco Use     Smoking status: Never Smoker     Smokeless tobacco: Never Used   Substance Use Topics     Alcohol use: No     Frequency: Never       ROS:  10 point ROS negative except as listed above      EXAM:   /78   Pulse 78   Temp 98.2  F (36.8  C) (Tympanic)   Resp 20   LMP 06/22/2019   SpO2 98%   GENERAL: alert, no acute distress.  SKIN: Rash description:    Distribution: generalized  Location: abdomen, arm, lower, arm, upper and lower leg    hives  EYES: conjunctiva clear  NECK: supple, non-tender to palpation, no adenopathy noted  RESP: lungs clear to auscultation - no rales, rhonchi or wheezes  CV: regular rates and rhythm, normal S1 S2, no murmur noted    Results for orders placed or performed in visit on 12/19/19   Comprehensive metabolic panel (BMP + Alb, Alk Phos, ALT, AST, Total. Bili, TP)     Status: Abnormal   Result Value Ref Range    Sodium 137 133 - 144 mmol/L    Potassium 3.3 (L) 3.4 - 5.3 mmol/L    Chloride 101 94 - 109 mmol/L    Carbon Dioxide 27 20 - 32 mmol/L    Anion Gap 9 3 - 14 mmol/L    Glucose 107 (H) 70 - 99 mg/dL    Urea Nitrogen 9 7 - 30 mg/dL    Creatinine  0.80 0.52 - 1.04 mg/dL    GFR Estimate >90 >60 mL/min/[1.73_m2]    GFR Estimate If Black >90 >60 mL/min/[1.73_m2]    Calcium 9.3 8.5 - 10.1 mg/dL    Bilirubin Total 0.7 0.2 - 1.3 mg/dL    Albumin 3.7 3.4 - 5.0 g/dL    Protein Total 7.3 6.8 - 8.8 g/dL    Alkaline Phosphatase 44 40 - 150 U/L    ALT 24 0 - 50 U/L    AST 29 0 - 45 U/L   CBC with platelets and differential     Status: None   Result Value Ref Range    WBC 9.6 4.0 - 11.0 10e9/L    RBC Count 4.47 3.8 - 5.2 10e12/L    Hemoglobin 13.1 11.7 - 15.7 g/dL    Hematocrit 37.6 35.0 - 47.0 %    MCV 84 78 - 100 fl    MCH 29.3 26.5 - 33.0 pg    MCHC 34.8 31.5 - 36.5 g/dL    RDW 12.7 10.0 - 15.0 %    Platelet Count 267 150 - 450 10e9/L    % Neutrophils 75.4 %    % Lymphocytes 19.8 %    % Monocytes 4.2 %    % Eosinophils 0.5 %    % Basophils 0.1 %    Absolute Neutrophil 7.2 1.6 - 8.3 10e9/L    Absolute Lymphocytes 1.9 0.8 - 5.3 10e9/L    Absolute Monocytes 0.4 0.0 - 1.3 10e9/L    Absolute Eosinophils 0.1 0.0 - 0.7 10e9/L    Absolute Basophils 0.0 0.0 - 0.2 10e9/L    Diff Method Automated Method    HCG quantitative pregnancy     Status: Abnormal   Result Value Ref Range    HCG Quantitative Serum 5,308 (H) 0 - 5 IU/L         ASSESSMENT:  (L50.9) Urticaria  (primary encounter diagnosis)  Plan: Comprehensive metabolic panel (BMP + Alb, Alk         Phos, ALT, AST, Total. Bili, TP), CBC with         platelets and differential, famotidine (PEPCID)        10 MG tablet, diphenhydrAMINE (BENADRYL) 25 MG         capsule, cetirizine (ZYRTEC) 10 MG tablet    (Z32.01) Pregnancy test positive  (Z87.59) History of miscarriage  (Z32.01) Pregnancy test positive  Plan: Comprehensive metabolic panel (BMP + Alb, Alk         Phos, ALT, AST, Total. Bili, TP), CBC with         platelets and differential      (K21.9) Gastroesophageal reflux disease without esophagitis  Plan: famotidine (PEPCID) 10 MG tablet    Red flags and emergent follow up discussed, and understood by patient  Follow up with  OB/PCP if symptoms worsen or fail to improve  In 1-2 days    Patient Instructions       Patient Education     Understanding Hives (Urticaria)    Urticaria (hives) are red, itchy, and swollen areas on the skin. They are most often an allergic reaction from eating a food or taking a medicine. Sometimes the cause may be unknown. A single hive can vary in size from a half inch to several inches. Hives can appear all over the body. Or they may appear on only one part of the body.  What causes hives?  Hives can be caused by food and beverages such as:    Tree nuts (almonds, walnuts, hazelnuts)    Peanuts    Eggs    Shellfish    Milk  Hives can also be caused by medicines such as:    Antibiotics, especially penicillin and sulfa-based medicines     Anticonvulsant or antiseizure medicines     Chemotherapy medicines   Other causes of hives include:    Infection or virus    Heat    Cold air or cold water    Exercise    Scratching or rubbing your skin, or wearing tight-fitting clothes that rub your skin    Being exposed to sunlight or light from a light bulb, in rare cases    Inhaled-chemicals in the environment from foods and drugs, insects, plants, or other sources  In some cases, hives may occur again and again with no specific cause.  If you have hives    Stay away from the food, drink, medicine, or other thing that may be causing the hives.    Ask your healthcare provider how to control itchy or irritated skin.    Talk with your healthcare provider right away if you think a medicine gave you hives.  Watch for anaphylaxis  If you have hives, watch for symptoms of a severe reaction that can affect your entire body. This is called anaphylaxis. Symptoms can include swollen areas of the body, wheezing, trouble breathing or swallowing, and a hoarse voice. This reaction may happen right away. Or it may happen in an hour or more. In extreme cases, the airways from mouth to lungs may swell and make breathing difficult. This is a  medical emergency. Use epinephrine medicine if you have it, and call 911 or go to the emergency room.     When to call your healthcare provider  Call your healthcare provider if:    Your hives feel uncomfortable    You have never had hives before    Your symptoms don't go away or come back    Your symptoms get worse or new symptoms develop such as:   ? Sneezing, coughing, runny or stuffy nose  ? Itching of the eyes, nose, or roof of the mouth  ? Itching, burning, stinging, or pain  ? Dry, flaky, cracking, or scaly skin  ? Red or purple spots  Call 911  Call 911 right away if you have:    Swelling in your lips, tongue, or throat, called angioedema    Drooling    Trouble breathing, talking, or swallowing    Cool, moist or pale (blue in color) skin    Fast and weak heartbeat    Wheezing or short of breath    Feeling lightheaded or confused    Diarrhea    Severe nausea or vomiting    Seizure    Feeling dizzy or weak, or a sudden drop in blood pressure   Date Last Reviewed: 4/1/2017 2000-2018 [employer]. [address] [place] [address]. All rights reserved. This information is not intended as a substitute for professional medical care. Always follow your healthcare professional's instructions.           Patient Education     Adapting to Pregnancy: First Trimester    As your body adjusts, you may have to change or limit your daily activities. You ll need more rest. You may also need to use the energy you have more wisely.  Your changing body  Almost every part of your body is affected as you adapt to pregnancy. The uterus and cervix will begin to soften right away. You may not look very pregnant during the first 3 months. But you are likely to have some common signs of early pregnancy:    Nausea    Fatigue    Frequent urination    Mood swings    Bloating of the belly    Constipation    Heartburn    Missed or light periods (first trimester bleeding)    Nipple or breast tenderness and breast  swelling  It s not too late to start good habits  What matters most is protecting your baby from this moment on. If you smoke, drink alcohol, or use drugs, now is the time to stop. If you need help, talk with your healthcare provider:    Smoking increases the risk of stillbirth or having a low-birth-weight baby. If you smoke, quit now.    Alcohol and drugs have been linked with miscarriage, birth defects, intellectual disability, and low birth weight. Do not drink alcohol or take drugs.  Tips to relieve nausea  Although nausea can happen at any time of the day, it may be worse in the morning. To help prevent nausea:    Eat small, light meals at frequent intervals.    Drink fluids often.    Get up slowly. Eat a few unsalted crackers before you get out of bed.    Avoid smells that bother you.    Avoid spicy and fatty foods.    Eat an ice pop in your favorite flavor.    Get plenty of rest.    Ask your healthcare provider about taking kei or vitamin B6 for nausea and vomiting.    Talk with your healthcare provider if you take vitamins that upset your stomach.  Work concerns  The end of the first trimester is a good time to discuss working during pregnancy with your employer. Follow your healthcare provider s advice if your job needs you to stand for a long time, work with hazardous tools, or even sit at a desk all day. Your workspace, workload, or scheduled hours may need to be adjusted. Perhaps you can change body postures more often or take an extra break.  Advice for travel  Talk to your healthcare provider first, but the second trimester may be the best time for any travel. You may be advised to avoid certain trips while you re pregnant. Food and water can be concerns in developing countries. Travel by car is a good choice, as you can stop, get out, and stretch. Bring snacks and water along. Fasten the lap belt below your belly, low over your hips. Also be sure to wear the shoulder harness.  Intimacy  Unless your  healthcare provider tells you to, there is no reason to stop having sex while you re pregnant. You or your partner may notice changes in desire. Desire may be less in the first trimester, due to nausea and fatigue. In the second trimester, sex may be very enjoyable. The third trimester can be a challenge comfort-wise. Try different positions and see what s best for you both.  Date Last Reviewed: 10/1/2017    7447-1630 [employer]. [address] [place] [address], [place] [address]. All rights reserved. This information is not intended as a substitute for professional medical care. Always follow your healthcare professional's instructions.

## 2019-12-19 NOTE — PATIENT INSTRUCTIONS
Patient Education     Understanding Hives (Urticaria)    Urticaria (hives) are red, itchy, and swollen areas on the skin. They are most often an allergic reaction from eating a food or taking a medicine. Sometimes the cause may be unknown. A single hive can vary in size from a half inch to several inches. Hives can appear all over the body. Or they may appear on only one part of the body.  What causes hives?  Hives can be caused by food and beverages such as:    Tree nuts (almonds, walnuts, hazelnuts)    Peanuts    Eggs    Shellfish    Milk  Hives can also be caused by medicines such as:    Antibiotics, especially penicillin and sulfa-based medicines     Anticonvulsant or antiseizure medicines     Chemotherapy medicines   Other causes of hives include:    Infection or virus    Heat    Cold air or cold water    Exercise    Scratching or rubbing your skin, or wearing tight-fitting clothes that rub your skin    Being exposed to sunlight or light from a light bulb, in rare cases    Inhaled-chemicals in the environment from foods and drugs, insects, plants, or other sources  In some cases, hives may occur again and again with no specific cause.  If you have hives    Stay away from the food, drink, medicine, or other thing that may be causing the hives.    Ask your healthcare provider how to control itchy or irritated skin.    Talk with your healthcare provider right away if you think a medicine gave you hives.  Watch for anaphylaxis  If you have hives, watch for symptoms of a severe reaction that can affect your entire body. This is called anaphylaxis. Symptoms can include swollen areas of the body, wheezing, trouble breathing or swallowing, and a hoarse voice. This reaction may happen right away. Or it may happen in an hour or more. In extreme cases, the airways from mouth to lungs may swell and make breathing difficult. This is a medical emergency. Use epinephrine medicine if you have it, and call 911 or go to the  emergency room.     When to call your healthcare provider  Call your healthcare provider if:    Your hives feel uncomfortable    You have never had hives before    Your symptoms don't go away or come back    Your symptoms get worse or new symptoms develop such as:   ? Sneezing, coughing, runny or stuffy nose  ? Itching of the eyes, nose, or roof of the mouth  ? Itching, burning, stinging, or pain  ? Dry, flaky, cracking, or scaly skin  ? Red or purple spots  Call 911  Call 911 right away if you have:    Swelling in your lips, tongue, or throat, called angioedema    Drooling    Trouble breathing, talking, or swallowing    Cool, moist or pale (blue in color) skin    Fast and weak heartbeat    Wheezing or short of breath    Feeling lightheaded or confused    Diarrhea    Severe nausea or vomiting    Seizure    Feeling dizzy or weak, or a sudden drop in blood pressure   Date Last Reviewed: 4/1/2017 2000-2018 [employer]. [address] [place] [address]. All rights reserved. This information is not intended as a substitute for professional medical care. Always follow your healthcare professional's instructions.           Patient Education     Adapting to Pregnancy: First Trimester    As your body adjusts, you may have to change or limit your daily activities. You ll need more rest. You may also need to use the energy you have more wisely.  Your changing body  Almost every part of your body is affected as you adapt to pregnancy. The uterus and cervix will begin to soften right away. You may not look very pregnant during the first 3 months. But you are likely to have some common signs of early pregnancy:    Nausea    Fatigue    Frequent urination    Mood swings    Bloating of the belly    Constipation    Heartburn    Missed or light periods (first trimester bleeding)    Nipple or breast tenderness and breast swelling  It s not too late to start good habits  What matters most is protecting your  baby from this moment on. If you smoke, drink alcohol, or use drugs, now is the time to stop. If you need help, talk with your healthcare provider:    Smoking increases the risk of stillbirth or having a low-birth-weight baby. If you smoke, quit now.    Alcohol and drugs have been linked with miscarriage, birth defects, intellectual disability, and low birth weight. Do not drink alcohol or take drugs.  Tips to relieve nausea  Although nausea can happen at any time of the day, it may be worse in the morning. To help prevent nausea:    Eat small, light meals at frequent intervals.    Drink fluids often.    Get up slowly. Eat a few unsalted crackers before you get out of bed.    Avoid smells that bother you.    Avoid spicy and fatty foods.    Eat an ice pop in your favorite flavor.    Get plenty of rest.    Ask your healthcare provider about taking kei or vitamin B6 for nausea and vomiting.    Talk with your healthcare provider if you take vitamins that upset your stomach.  Work concerns  The end of the first trimester is a good time to discuss working during pregnancy with your employer. Follow your healthcare provider s advice if your job needs you to stand for a long time, work with hazardous tools, or even sit at a desk all day. Your workspace, workload, or scheduled hours may need to be adjusted. Perhaps you can change body postures more often or take an extra break.  Advice for travel  Talk to your healthcare provider first, but the second trimester may be the best time for any travel. You may be advised to avoid certain trips while you re pregnant. Food and water can be concerns in developing countries. Travel by car is a good choice, as you can stop, get out, and stretch. Bring snacks and water along. Fasten the lap belt below your belly, low over your hips. Also be sure to wear the shoulder harness.  Intimacy  Unless your healthcare provider tells you to, there is no reason to stop having sex while you re  pregnant. You or your partner may notice changes in desire. Desire may be less in the first trimester, due to nausea and fatigue. In the second trimester, sex may be very enjoyable. The third trimester can be a challenge comfort-wise. Try different positions and see what s best for you both.  Date Last Reviewed: 10/1/2017    9107-7055 [employer]. [address] [place] [address], [place] [address]. All rights reserved. This information is not intended as a substitute for professional medical care. Always follow your healthcare professional's instructions.

## 2019-12-20 LAB — B-HCG SERPL-ACNC: 5308 IU/L (ref 0–5)

## 2019-12-31 DIAGNOSIS — O26.859 SPOTTING IN EARLY PREGNANCY: ICD-10-CM

## 2020-01-14 ENCOUNTER — PRENATAL OFFICE VISIT (OUTPATIENT)
Dept: NURSING | Facility: CLINIC | Age: 30
End: 2020-01-14
Payer: COMMERCIAL

## 2020-01-14 VITALS
HEART RATE: 64 BPM | HEIGHT: 59 IN | DIASTOLIC BLOOD PRESSURE: 42 MMHG | BODY MASS INDEX: 22.54 KG/M2 | SYSTOLIC BLOOD PRESSURE: 86 MMHG | WEIGHT: 111.8 LBS

## 2020-01-14 DIAGNOSIS — Z34.81 ENCOUNTER FOR SUPERVISION OF OTHER NORMAL PREGNANCY IN FIRST TRIMESTER: Primary | ICD-10-CM

## 2020-01-14 LAB
ABO + RH BLD: NORMAL
ABO + RH BLD: NORMAL
BLD GP AB SCN SERPL QL: NORMAL
BLOOD BANK CMNT PATIENT-IMP: NORMAL
GROUP B STREP PCR: POSITIVE
SPECIMEN EXP DATE BLD: NORMAL

## 2020-01-14 PROCEDURE — 36415 COLL VENOUS BLD VENIPUNCTURE: CPT | Performed by: OBSTETRICS & GYNECOLOGY

## 2020-01-14 PROCEDURE — 86762 RUBELLA ANTIBODY: CPT | Performed by: OBSTETRICS & GYNECOLOGY

## 2020-01-14 PROCEDURE — 87389 HIV-1 AG W/HIV-1&-2 AB AG IA: CPT | Performed by: OBSTETRICS & GYNECOLOGY

## 2020-01-14 PROCEDURE — 99207 ZZC NO CHARGE NURSE ONLY: CPT

## 2020-01-14 PROCEDURE — 86850 RBC ANTIBODY SCREEN: CPT | Performed by: OBSTETRICS & GYNECOLOGY

## 2020-01-14 PROCEDURE — 87340 HEPATITIS B SURFACE AG IA: CPT | Performed by: OBSTETRICS & GYNECOLOGY

## 2020-01-14 PROCEDURE — 86901 BLOOD TYPING SEROLOGIC RH(D): CPT | Performed by: OBSTETRICS & GYNECOLOGY

## 2020-01-14 PROCEDURE — 86780 TREPONEMA PALLIDUM: CPT | Performed by: OBSTETRICS & GYNECOLOGY

## 2020-01-14 PROCEDURE — 87086 URINE CULTURE/COLONY COUNT: CPT | Performed by: OBSTETRICS & GYNECOLOGY

## 2020-01-14 PROCEDURE — 87088 URINE BACTERIA CULTURE: CPT | Performed by: OBSTETRICS & GYNECOLOGY

## 2020-01-14 PROCEDURE — 86900 BLOOD TYPING SEROLOGIC ABO: CPT | Performed by: OBSTETRICS & GYNECOLOGY

## 2020-01-14 ASSESSMENT — MIFFLIN-ST. JEOR: SCORE: 1147.75

## 2020-01-14 NOTE — PROGRESS NOTES
"Chief Complaint   Patient presents with     Prenatal Care     New Prenatal Nurse Visit   [id number]  Estimated Date of Delivery: Aug 19, 2020      Initial BP (!) 86/42 (BP Location: Right arm, Cuff Size: Adult Regular)   Pulse 64   Ht 1.499 m (4' 11\")   Wt 50.7 kg (111 lb 12.8 oz)   LMP 2019 (Exact Date)   BMI 22.58 kg/m   Estimated body mass index is 22.58 kg/m  as calculated from the following:    Height as of this encounter: 1.499 m (4' 11\").    Weight as of this encounter: 50.7 kg (111 lb 12.8 oz).  BP completed using cuff size: regular    Prenatal book and folder (containing standard educational hand-outs and brochures) given to patient. Information in folder reviewed. Questions answered. Brochure given on optional screening available to assess chromosomal anomalies. Pt advised to call the clinic if she has any questions or concerns related to her pregnancy. Prenatal labs obtained. New prenatal visit scheduled on 20 with Dr Fisher.        No results found for: PAP        Patient supplied answers from flow sheet for:  Prenatal OB Questionnaire.  Past Medical History  Diabetes?: No  Hypertension : No  Heart disease, mitral valve prolapse or rheumatic fever?: No  An autoimmune disease such as lupus or rheumatoid arthritis?: No  Kidney disease or urinary tract infection?: No  Epilepsy, seizures or spells?: No  Migraine headaches?: No  A stroke or loss of function or sensation?: No  Any other neurological problems?: No  Have you ever been treated for depression?: No  Are you having problems with crying spells or loss of self-esteem?: No  Have you ever required psychiatric care?: No  Have you ever had hepatitis, liver disease or jaundice?: No  Have you been treated for blood clots in your veins, deep vein thromosis, inflammation in the veins, thrombosis, phlebitis, pulmonary embolism or varicosities?: No  Have you had excessive bleeding after surgery or dental work?: No  Do you bleed more than other " women after a cut or scratch?: No  Do you have a history of anemia?: No  Have you ever had thyroid problems or taken thyroid medication?: (!) Yes(has had medication before. recent labs normal)   Do you have any endocrine problems?: No  Have you ever been in a major accident or suffered serious trauma?: No  Within the last year, has anyone hit, slapped, kicked or otherwise hurt you?: No  In the last year, has anyone forced you to have sex when you didn't want to?: No    Past Medical History 2   Have you ever received a blood transfusion?: No  Would you refuse a blood transfusion if a doctor judged it to be medically necessary?: No   If you answered Yes, would you rather die than receive a blood transfusion?: No  If you answered Yes, is this for Church reasons?: No  Does anyone in your home smoke?: No  Do you use tobacco products?: No  Do you drink beer, wine or hard liquor?: No  Do you use any of the following: marijuana, speed, cocaine, heroin, hallucinogens or other drugs?: No   Is your blood type Rh negative?: No  Have you ever had abnormal antibodies in your blood?: No  Have you ever had asthma?: No  Have you ever had tuberculosis?: No  Do you have any allergies to drugs or over-the-counter medications?: No  Allergies: Dust Mites, Aspartame, Ethanol, Venlafaxine, Hydrochloride, Sertraline: No  Have you had any breast problems?: No  Have you ever ?: No  Have you had any gynecological surgical procedures such as cervical conization, a LEEP procedure, laser treatment, cryosurgery of the cervix or a dilation and curettage, etc?: No  Have you ever had any other surgical procedures?: (!) Yes(see surgical history)  Have you been hospitalized for a nonsurgical reason excluding normal delivery?: No  Have you ever had any anesthetic complications?: No  Have you ever had an abnormal pap smear?: No    Past Medical History (Continued)  Do you have a history of abnormalities of the uterus?: No  Did your mother take  WILLY or any other hormones when she was pregnant with you?: No  Did it take you more than a year to become pregnant?: No  Have you ever been evaluated or treated for infertility?: (!) YES(1st pregnancy used clomid)  Is there a history of medical problems in your family, which you feel may be important to this pregnancy?: No  Do you have any other problems we have not asked about which you feel may be important to this pregnancy?: No    Symptoms since last menstrual period  Do you have any of the following symptoms: abdominal pain, blood in stools or urine, chest pain, shortness of breath, coughing or vomiting up blood, your heart racing or skipping beats, nausea and vomiting, pain on urination or vaginal discharge or bleed: (!) Yes(nausea , vomiting)  Current medications, including over-the-counter medications, you are using? (If not applicable answer none): see med list  Will the patient be 35 years old or older at the time of delivery?: No    Has the patient, baby's father or anyone in either family had:  Thalassemia (Italian, Greek, Mediterranean or  background only) and an MCV result less than 80?: No  Neural tube defect such as meningomyelocele, spina bifida or anencephaly?: No  Congenital heart defect?: No  Down's Syndrome?: No  Marco-Sachs disease (Religion, Cajun, Georgian-[place])?: No  Sickle cell disease or trait ()?: No  Hemophilia or other inherited problems of blood?: No  Muscular dystrophy?: No  Cystic fibrosis?: No  [place]'s chorea?: No  Mental retardation/autism?: No  If yes, was the person tested for fragile X?: No  Any other inherited genetic or chromosomal disorder?: No  Maternal metabolic disorder (e.g Insulin-dependent diabetes, PKU)?: No  A child with birth defects not listed above?: No  Recurrent pregnancy loss or stillbirth?: No   Has the patient had any medications/street drugs/alcohol since her last menstrual period?: No  Does the patient or baby's father have any other genetic  risks?: No    Infection History   Do you object to being tested for Hepatitis B?: No  Do you object to being tested for HIV?: No   Do you feel that you are at high risk for coming in contact with the AIDS virus?: No  Have you ever been treated for tuberculosis?: No  Have you ever had a positive skin test for tuberculosis?: No  Do you live with someone who has tuberculosis?: No  Have you ever been exposed to tuberculosis?: No  Do you have genital herpes?: No  Does your partner have genital herpes?: No  Have you had a viral illness since your last period?: No  Have you ever had gonorrhea, chlamydia, syphilis, venereal warts, trichomoniasis, pelvic inflammatory disease or any other sexually transmitted disease?: No  Do you know if you are a genital group B streptococcus carrier?: No  Have you had chicken pox/varicella?: (!) Yes   Have you been vaccinated against chicken Pox?: No  Have you had any other infectious diseases?: No    Pinky Nolan RN

## 2020-01-15 LAB
BACTERIA SPEC CULT: ABNORMAL
BACTERIA SPEC CULT: ABNORMAL
HBV SURFACE AG SERPL QL IA: NONREACTIVE
HIV 1+2 AB+HIV1 P24 AG SERPL QL IA: NONREACTIVE
RUBV IGG SERPL IA-ACNC: 71 IU/ML
SPECIMEN SOURCE: ABNORMAL
T PALLIDUM AB SER QL: NONREACTIVE

## 2020-01-17 DIAGNOSIS — Z87.59 HISTORY OF MISCARRIAGE: ICD-10-CM

## 2020-01-17 DIAGNOSIS — Z32.01 PREGNANCY TEST POSITIVE: ICD-10-CM

## 2020-02-04 ENCOUNTER — PRENATAL OFFICE VISIT (OUTPATIENT)
Dept: OBGYN | Facility: CLINIC | Age: 30
End: 2020-02-04
Payer: COMMERCIAL

## 2020-02-04 VITALS
BODY MASS INDEX: 22.78 KG/M2 | DIASTOLIC BLOOD PRESSURE: 50 MMHG | WEIGHT: 113 LBS | SYSTOLIC BLOOD PRESSURE: 90 MMHG | HEIGHT: 59 IN

## 2020-02-04 DIAGNOSIS — Z11.3 SCREENING EXAMINATION FOR SEXUALLY TRANSMITTED DISEASE: ICD-10-CM

## 2020-02-04 DIAGNOSIS — Z34.80 SUPERVISION OF OTHER NORMAL PREGNANCY, ANTEPARTUM: Primary | ICD-10-CM

## 2020-02-04 PROCEDURE — 36415 COLL VENOUS BLD VENIPUNCTURE: CPT | Performed by: OBSTETRICS & GYNECOLOGY

## 2020-02-04 PROCEDURE — 87591 N.GONORRHOEAE DNA AMP PROB: CPT | Performed by: OBSTETRICS & GYNECOLOGY

## 2020-02-04 PROCEDURE — 99207 ZZC FIRST OB VISIT: CPT | Performed by: OBSTETRICS & GYNECOLOGY

## 2020-02-04 PROCEDURE — 40000791 ZZHCL STATISTIC VERIFI PRENATAL TRISOMY 21,18,13: Mod: 90 | Performed by: OBSTETRICS & GYNECOLOGY

## 2020-02-04 PROCEDURE — 99000 SPECIMEN HANDLING OFFICE-LAB: CPT | Performed by: OBSTETRICS & GYNECOLOGY

## 2020-02-04 PROCEDURE — 87491 CHLMYD TRACH DNA AMP PROBE: CPT | Performed by: OBSTETRICS & GYNECOLOGY

## 2020-02-04 ASSESSMENT — MIFFLIN-ST. JEOR: SCORE: 1153.19

## 2020-02-04 NOTE — PROGRESS NOTES
This is a 29 year old female patient,   who presents for her first obstetrical visit. This pregnancy is Planned, Desired.    EDC Aug 19, 2020 by LMP and Previous US which makes her 11w6d  today.  Her cycles are irregular.  Her last menstrual period was normal. Since her LMP, she has experienced  nausea and emesis.  She denies vaginal bleeding. Ultrasound in the 1st trimester showed EDC consistent with dates by LMP.     OB History    Para Term  AB Living   2 0 0 0 1 0   SAB TAB Ectopic Multiple Live Births   0 0 0 0 0      # Outcome Date GA Lbr Jimy/2nd Weight Sex Delivery Anes PTL Lv   2 Current            1 AB 19 8w4d              History of GDM: No,  PTL : No,  History of HTN in pregnancy: No,  Thrombocytopenia: No,  Shoulder dystocia: No,  Vacuum Extraction: No  PPH: No   3rd of 4th degree laceration: No.   Other complications: No      Since her last LMP she denies use of alcohol, tobacco and street drugs.    HISTORY:  Past Medical History:   Diagnosis Date     Hypothyroidism      Primary female infertility      Past Surgical History:   Procedure Laterality Date     ESOPHAGOSCOPY, GASTROSCOPY, DUODENOSCOPY (EGD), COMBINED N/A 2019    Procedure: ESOPHAGOGASTRODUODENOSCOPY, WITH BIOPSY;  Surgeon: Evan Aaron MD;  Location:  GI     Family History   Problem Relation Age of Onset     Diabetes Type 2  Father      Social History     Socioeconomic History     Marital status:      Spouse name: Butch Ricardo     Number of children: Not on file     Years of education: Not on file     Highest education level: Master's degree (e.g., MA, MS, Alix, MEd, MSW, KAYE)   Occupational History     Occupation: IT   Social Needs     Financial resource strain: Not hard at all     Food insecurity:     Worry: Never true     Inability: Never true     Transportation needs:     Medical: No     Non-medical: No   Tobacco Use     Smoking status: Never Smoker     Smokeless tobacco: Never Used    Substance and Sexual Activity     Alcohol use: No     Frequency: Never     Drug use: No     Sexual activity: Yes     Partners: Male   Lifestyle     Physical activity:     Days per week: Not on file     Minutes per session: Not on file     Stress: Not on file   Relationships     Social connections:     Talks on phone: Not on file     Gets together: Not on file     Attends [employer] service: Not on file     Active member of club or organization: Not on file     Attends meetings of clubs or organizations: Not on file     Relationship status: Not on file     Intimate partner violence:     Fear of current or ex partner: Not on file     Emotionally abused: Not on file     Physically abused: Not on file     Forced sexual activity: Not on file   Other Topics Concern     Not on file   Social History Narrative     Not on file     Current Outpatient Medications   Medication Sig     cetirizine (ZYRTEC) 10 MG tablet Take 1 tablet (10 mg) by mouth daily (Patient not taking: Reported on 1/14/2020)     diphenhydrAMINE (BENADRYL) 25 MG capsule Take 2 capsules (50 mg) by mouth every 6 hours as needed for itching or allergies (Patient not taking: Reported on 1/14/2020)     omeprazole (PRILOSEC) 20 MG DR capsule Take 20 mg by mouth 2 times daily     Prenatal Vit-Fe Fumarate-FA (PRENATAL MULTIVITAMIN W/IRON) 27-0.8 MG tablet Take 1 tablet by mouth daily     No current facility-administered medications for this visit.      No Known Allergies    Past medical, surgical, social and family history were reviewed and updated in EPIC.    ROS:   12 point review of systems negative other than symptoms noted below.    EXAM:  LMP 11/13/2019 (Exact Date)    BMI: There is no height or weight on file to calculate BMI.    EXAM:  Constitutional: Appearance: Well nourished, well developed alert, in no acute distress  Chest:  Respiratory Effort:  Breathing unlabored  Cardiovascular:Heart    Auscultation:  Regular rate, normal rhythm, no murmurs  present  Gastrointestinal:  Abdominal Examination:  Abdomen nontender to palpation, tone normal without     rigidity or guarding, no masses present, umbilicus without lesions    Liver and speen:  No hepatomegaly present, liver nontender to palpation    Hernias:  No hernias present    FHTs auscultated at 165.  Skin:  General Inspection:  No rashes present, no lesions present, no areas of  discoloration.  Neurologic/Psychiatric:    Mental Status:  Oriented X3       Pelvis: Not needed today.    ASSESSMENT:      ICD-10-CM    1. Supervision of other normal pregnancy, antepartum Z34.80        PLAN:    Prenatal labs reviewed. She has no questions.    Discussed options for screening for and diagnosis of chromosomal anomalies, including first trimester screen, noninvasive prenatal testing/cell-free fetal DNA testing, CVS/amniocentesis, quad screen, and ultrasound or comprehensive Level II US at 18-20 weeks. She is electing noninvasive prenatal testing and ultrasound at 18-20 weeks.    Flu shot discussed--she has not had a flu shot, declines this.    Reviewed early pregnancy education, diet, exercise, prenatal vitamins, intercourse. Reviewed the call schedule, labor and delivery, and the schedule of prenatal visits.    Follow up in 4 weeks. She is encouraged to call sooner with questions or concerns.      Miladis Fisher MD

## 2020-02-04 NOTE — NURSING NOTE
"Chief Complaint   Patient presents with     Prenatal Care       Initial BP 90/50   Ht 1.499 m (4' 11\")   Wt 51.3 kg (113 lb)   LMP 2019 (Exact Date)   Breastfeeding No   BMI 22.82 kg/m   Estimated body mass index is 22.82 kg/m  as calculated from the following:    Height as of this encounter: 1.499 m (4' 11\").    Weight as of this encounter: 51.3 kg (113 lb).  BP completed using cuff size: regular    Questioned patient about current smoking habits.  Pt. has never smoked.        11w6d   + nausea and vomiting  - cramping or bleeding  + occas. Headache  + fatigue    The following HM Due: NONE      The following patient reported/Care Every where data was sent to:  P ABSTRACT QUALITY INITIATIVES [86222]  Beatriz Galloway LPN               "

## 2020-02-06 LAB
C TRACH DNA SPEC QL NAA+PROBE: NEGATIVE
N GONORRHOEA DNA SPEC QL NAA+PROBE: NEGATIVE
SPECIMEN SOURCE: NORMAL
SPECIMEN SOURCE: NORMAL

## 2020-02-11 ENCOUNTER — TELEPHONE (OUTPATIENT)
Dept: OBGYN | Facility: CLINIC | Age: 30
End: 2020-02-11

## 2020-02-11 NOTE — TELEPHONE ENCOUNTER
Pt advised of normal results.    Gender revealed to the pt over the phone.     Routed to the provider as an FYI.    Yola SWENSON RN

## 2020-02-11 NOTE — TELEPHONE ENCOUNTER
Results received from [facility] testing in [facility].  Testing done:  Innatal Prenatal Screen    Action:  LM for pt to cb to report NORMAL results.    Gender:**GIRL**  Will ask pt if they wish to know the gender.    Please route to provider as FYI once pt is informed.(MD)    Yola SWENSON, RN

## 2020-02-28 LAB — LAB SCANNED RESULT: NORMAL

## 2020-03-10 ENCOUNTER — PRENATAL OFFICE VISIT (OUTPATIENT)
Dept: OBGYN | Facility: CLINIC | Age: 30
End: 2020-03-10
Payer: COMMERCIAL

## 2020-03-10 VITALS — DIASTOLIC BLOOD PRESSURE: 50 MMHG | WEIGHT: 116 LBS | SYSTOLIC BLOOD PRESSURE: 90 MMHG | BODY MASS INDEX: 23.43 KG/M2

## 2020-03-10 DIAGNOSIS — L65.9 HAIR LOSS: ICD-10-CM

## 2020-03-10 DIAGNOSIS — Z34.80 SUPERVISION OF OTHER NORMAL PREGNANCY, ANTEPARTUM: Primary | ICD-10-CM

## 2020-03-10 LAB — HGB BLD-MCNC: 10.6 G/DL (ref 11.7–15.7)

## 2020-03-10 PROCEDURE — 82105 ALPHA-FETOPROTEIN SERUM: CPT | Mod: 90 | Performed by: OBSTETRICS & GYNECOLOGY

## 2020-03-10 PROCEDURE — 99000 SPECIMEN HANDLING OFFICE-LAB: CPT | Performed by: OBSTETRICS & GYNECOLOGY

## 2020-03-10 PROCEDURE — 84443 ASSAY THYROID STIM HORMONE: CPT | Performed by: OBSTETRICS & GYNECOLOGY

## 2020-03-10 PROCEDURE — 85018 HEMOGLOBIN: CPT | Performed by: OBSTETRICS & GYNECOLOGY

## 2020-03-10 PROCEDURE — 36415 COLL VENOUS BLD VENIPUNCTURE: CPT | Performed by: OBSTETRICS & GYNECOLOGY

## 2020-03-10 PROCEDURE — 99207 ZZC PRENATAL VISIT: CPT | Performed by: OBSTETRICS & GYNECOLOGY

## 2020-03-10 NOTE — PROGRESS NOTES
PROBLEM LIST  LABS: Apos/RI  GIRL    1.  Group B Strep bacteriuria: no test at 36w, treat in labor.    Discussed AFP, will check today. Also ordered US. Would like hgb and TSH checked today as she has had hair loss. Ordered these as well. Follow up in 4 weeks.    Miladis Fisher MD

## 2020-03-11 ENCOUNTER — HEALTH MAINTENANCE LETTER (OUTPATIENT)
Age: 30
End: 2020-03-11

## 2020-03-11 LAB — TSH SERPL DL<=0.005 MIU/L-ACNC: 3.29 MU/L (ref 0.4–4)

## 2020-03-13 LAB
# FETUSES US: NORMAL
# FETUSES: NORMAL
AFP ADJ MOM AMN: 1.08
AFP SERPL-MCNC: 47 NG/ML
AGE - REPORTED: 28.1 YR
CURRENT SMOKER: NO
CURRENT SMOKER: NO
DIABETES STATUS PATIENT: NO
FAMILY MEMBER DISEASES HX: NO
FAMILY MEMBER DISEASES HX: NO
GA METHOD: NORMAL
GA METHOD: NORMAL
GA: NORMAL WK
IDDM PATIENT QL: NO
INTEGRATED SCN PATIENT-IMP: NORMAL
LMP START DATE: NORMAL
MONOCHORIONIC TWINS: NO
SERVICE CMNT-IMP: NO
SPECIMEN DRAWN SERPL: NORMAL
VALPROIC/CARBAMAZEPINE STATUS: NO
WEIGHT UNITS: NORMAL

## 2020-03-25 DIAGNOSIS — Z34.80 SUPERVISION OF OTHER NORMAL PREGNANCY, ANTEPARTUM: ICD-10-CM

## 2020-03-26 DIAGNOSIS — Z34.92 PRENATAL CARE IN SECOND TRIMESTER: Primary | ICD-10-CM

## 2020-03-31 ENCOUNTER — PRENATAL OFFICE VISIT (OUTPATIENT)
Dept: OBGYN | Facility: CLINIC | Age: 30
End: 2020-03-31
Payer: COMMERCIAL

## 2020-03-31 VITALS — SYSTOLIC BLOOD PRESSURE: 96 MMHG | WEIGHT: 122 LBS | BODY MASS INDEX: 24.64 KG/M2 | DIASTOLIC BLOOD PRESSURE: 50 MMHG

## 2020-03-31 DIAGNOSIS — Z34.92 PRENATAL CARE IN SECOND TRIMESTER: Primary | ICD-10-CM

## 2020-03-31 PROCEDURE — 99207 ZZC PRENATAL VISIT: CPT | Performed by: OBSTETRICS & GYNECOLOGY

## 2020-03-31 RX ORDER — FERROUS SULFATE 325(65) MG
325 TABLET ORAL
COMMUNITY
End: 2023-10-31

## 2020-03-31 RX ORDER — AMOXICILLIN 250 MG
1 CAPSULE ORAL DAILY
COMMUNITY
End: 2023-10-31

## 2020-03-31 NOTE — NURSING NOTE
"Chief Complaint   Patient presents with     Prenatal Care     19w 6d       Initial BP 96/50 (BP Location: Right arm, Cuff Size: Adult Regular)   Wt 55.3 kg (122 lb)   LMP 2019 (Exact Date)   BMI 24.64 kg/m   Estimated body mass index is 24.64 kg/m  as calculated from the following:    Height as of 20: 1.499 m (4' 11\").    Weight as of this encounter: 55.3 kg (122 lb).  BP completed using cuff size: regular    Questioned patient about current smoking habits.  Pt. has never smoked.          Discuss iron and stool softener    Has had 1-2 hours of sleep for the past 2 days.     Colin Garg, CMA             "

## 2020-03-31 NOTE — PROGRESS NOTES
PROBLEM LIST  LABS: Apos/RI  GIRL    1.  Group B Strep bacteriuria: no test at 36w, treat in labor.    US normal other than needing cardiac views and profile repeated--scheduled in 2 weeks from now. TSH normal last time, hgb a bit low, will start iron supplement. Follow up in 4 weeks on phone.    Miladis Fisher MD

## 2020-04-14 ENCOUNTER — ANCILLARY PROCEDURE (OUTPATIENT)
Dept: ULTRASOUND IMAGING | Facility: CLINIC | Age: 30
End: 2020-04-14
Attending: FAMILY MEDICINE
Payer: COMMERCIAL

## 2020-04-14 DIAGNOSIS — Z34.92 PRENATAL CARE IN SECOND TRIMESTER: ICD-10-CM

## 2020-04-14 PROCEDURE — 76816 OB US FOLLOW-UP PER FETUS: CPT | Performed by: OBSTETRICS & GYNECOLOGY

## 2020-04-17 ENCOUNTER — TELEPHONE (OUTPATIENT)
Dept: OBGYN | Facility: CLINIC | Age: 30
End: 2020-04-17

## 2020-04-17 DIAGNOSIS — R30.0 DYSURIA: Primary | ICD-10-CM

## 2020-04-17 DIAGNOSIS — Z34.92 PRENATAL CARE IN SECOND TRIMESTER: ICD-10-CM

## 2020-04-17 RX ORDER — NITROFURANTOIN 25; 75 MG/1; MG/1
100 CAPSULE ORAL 2 TIMES DAILY
Qty: 14 CAPSULE | Refills: 0 | Status: SHIPPED | OUTPATIENT
Start: 2020-04-17 | End: 2020-07-23

## 2020-04-17 NOTE — TELEPHONE ENCOUNTER
Pt calling 22.2w  with intermittent burning with urination, feeling like she has to go then not being able to go.  This has been going on for the past week.    Swelling near urethra.  Some increased discharge, no odor to it.    Lab appt scheduled for UA and swelf swab.    Please advise if you want anything else?    Sheri Paul RN

## 2020-04-17 NOTE — TELEPHONE ENCOUNTER
What you have advised is great.   I have nothing more to add. Will follow-up on the urine test and self wet prep to await the need for treatment. Given that her urinary complaints are strongly suspicious for UTI, I will send in antibiotics to her pharmacy to pick it up and follow-up on the urine culture to re-affirm if she needs to continue it or not.     Catalino Betancourt MD

## 2020-04-20 DIAGNOSIS — R30.0 DYSURIA: ICD-10-CM

## 2020-04-20 LAB
ALBUMIN UR-MCNC: NEGATIVE MG/DL
APPEARANCE UR: CLEAR
BILIRUB UR QL STRIP: NEGATIVE
COLOR UR AUTO: YELLOW
GLUCOSE UR STRIP-MCNC: NEGATIVE MG/DL
HGB UR QL STRIP: ABNORMAL
KETONES UR STRIP-MCNC: NEGATIVE MG/DL
LEUKOCYTE ESTERASE UR QL STRIP: NEGATIVE
NITRATE UR QL: NEGATIVE
NON-SQ EPI CELLS #/AREA URNS LPF: ABNORMAL /LPF
PH UR STRIP: 6.5 PH (ref 5–7)
RBC #/AREA URNS AUTO: ABNORMAL /HPF
SOURCE: ABNORMAL
SP GR UR STRIP: 1.01 (ref 1–1.03)
SPECIMEN SOURCE: NORMAL
UROBILINOGEN UR STRIP-ACNC: 0.2 EU/DL (ref 0.2–1)
WBC #/AREA URNS AUTO: ABNORMAL /HPF
WET PREP SPEC: NORMAL

## 2020-04-20 PROCEDURE — 87210 SMEAR WET MOUNT SALINE/INK: CPT | Performed by: OBSTETRICS & GYNECOLOGY

## 2020-04-20 PROCEDURE — 87086 URINE CULTURE/COLONY COUNT: CPT | Performed by: OBSTETRICS & GYNECOLOGY

## 2020-04-20 PROCEDURE — 81001 URINALYSIS AUTO W/SCOPE: CPT | Performed by: OBSTETRICS & GYNECOLOGY

## 2020-04-20 NOTE — RESULT ENCOUNTER NOTE
Inform patient that her wet prep is negative. There is no yeast infection and no bacterial vaginitis seen. Her urine analysis shows trace blood but no markers of infection. We will await on the results of the urine culture to determine if a UTI is present or not and treat accordingly.     Catalino Betancourt MD

## 2020-04-21 LAB
BACTERIA SPEC CULT: NORMAL
SPECIMEN SOURCE: NORMAL

## 2020-04-22 NOTE — RESULT ENCOUNTER NOTE
Inform patient that her urine culture returned negative for a bacteria that can cause a UTI. Therefore, she does not need to be prescribed an antibiotic.     Catalino Betancourt MD

## 2020-04-24 ENCOUNTER — VIRTUAL VISIT (OUTPATIENT)
Dept: OBGYN | Facility: CLINIC | Age: 30
End: 2020-04-24
Payer: COMMERCIAL

## 2020-04-24 VITALS — BODY MASS INDEX: 26.86 KG/M2 | WEIGHT: 133 LBS

## 2020-04-24 DIAGNOSIS — Z34.92 PRENATAL CARE IN SECOND TRIMESTER: Primary | ICD-10-CM

## 2020-04-24 PROCEDURE — 99207 ZZC PRENATAL VISIT: CPT | Performed by: OBSTETRICS & GYNECOLOGY

## 2020-04-24 NOTE — NURSING NOTE
"Chief Complaint   Patient presents with     Prenatal Care       Initial Wt 60.3 kg (133 lb)   LMP 2019 (Exact Date)   Breastfeeding No   BMI 26.86 kg/m   Estimated body mass index is 26.86 kg/m  as calculated from the following:    Height as of 20: 1.499 m (4' 11\").    Weight as of this encounter: 60.3 kg (133 lb).  BP completed using cuff size: NA (Not Taken)    Questioned patient about current smoking habits.  Pt. has never smoked.          23w2d  + FM daily  + mild cramping   - spotting  + mild headache  - heartburn  + constipation  Beatriz Galloway LPN               "

## 2020-04-24 NOTE — PROGRESS NOTES
PROBLEM LIST  LABS: Apos/RI  GIRL    1.  Group B Strep bacteriuria: no test at 36w, treat in labor.    Discussed masking for visits, GCT next time. Follow up in 4 weeks in clinic.    Miladis Fisher MD

## 2020-04-24 NOTE — PROGRESS NOTES
"Marisel Perry is a 29 year old female who is being evaluated via a billable telephone visit.      The patient has been notified of following:     \"This telephone visit will be conducted via a call between you and your physician/provider. We have found that certain health care needs can be provided without the need for a physical exam.  This service lets us provide the care you need with a short phone conversation.  If a prescription is necessary we can send it directly to your pharmacy.  If lab work is needed we can place an order for that and you can then stop by our lab to have the test done at a later time.    Telephone visits are billed at different rates depending on your insurance coverage. During this emergency period, for some insurers they may be billed the same as an in-person visit.  Please reach out to your insurance provider with any questions.    If during the course of the call the physician/provider feels a telephone visit is not appropriate, you will not be charged for this service.\"    Patient has given verbal consent for Telephone visit?  Yes    How would you like to obtain your AVS? MyChart      "

## 2020-05-29 ENCOUNTER — PRENATAL OFFICE VISIT (OUTPATIENT)
Dept: OBGYN | Facility: CLINIC | Age: 30
End: 2020-05-29
Payer: COMMERCIAL

## 2020-05-29 VITALS — DIASTOLIC BLOOD PRESSURE: 54 MMHG | SYSTOLIC BLOOD PRESSURE: 98 MMHG | WEIGHT: 132 LBS | BODY MASS INDEX: 26.66 KG/M2

## 2020-05-29 DIAGNOSIS — Z34.90 PRENATAL CARE, ANTEPARTUM: Primary | ICD-10-CM

## 2020-05-29 LAB
ERYTHROCYTE [DISTWIDTH] IN BLOOD BY AUTOMATED COUNT: 13.5 % (ref 10–15)
GLUCOSE 1H P 50 G GLC PO SERPL-MCNC: 106 MG/DL (ref 60–129)
HCT VFR BLD AUTO: 34 % (ref 35–47)
HGB BLD-MCNC: 11.4 G/DL (ref 11.7–15.7)
MCH RBC QN AUTO: 31 PG (ref 26.5–33)
MCHC RBC AUTO-ENTMCNC: 33.5 G/DL (ref 31.5–36.5)
MCV RBC AUTO: 92 FL (ref 78–100)
PLATELET # BLD AUTO: 212 10E9/L (ref 150–450)
RBC # BLD AUTO: 3.68 10E12/L (ref 3.8–5.2)
WBC # BLD AUTO: 11.7 10E9/L (ref 4–11)

## 2020-05-29 PROCEDURE — 90471 IMMUNIZATION ADMIN: CPT | Performed by: OBSTETRICS & GYNECOLOGY

## 2020-05-29 PROCEDURE — 90715 TDAP VACCINE 7 YRS/> IM: CPT | Performed by: OBSTETRICS & GYNECOLOGY

## 2020-05-29 PROCEDURE — 99207 ZZC PRENATAL VISIT: CPT | Performed by: OBSTETRICS & GYNECOLOGY

## 2020-05-29 PROCEDURE — 82950 GLUCOSE TEST: CPT | Performed by: OBSTETRICS & GYNECOLOGY

## 2020-05-29 PROCEDURE — 36415 COLL VENOUS BLD VENIPUNCTURE: CPT | Performed by: OBSTETRICS & GYNECOLOGY

## 2020-05-29 PROCEDURE — 86780 TREPONEMA PALLIDUM: CPT | Performed by: OBSTETRICS & GYNECOLOGY

## 2020-05-29 PROCEDURE — 85027 COMPLETE CBC AUTOMATED: CPT | Performed by: OBSTETRICS & GYNECOLOGY

## 2020-05-29 NOTE — NURSING NOTE
"Chief Complaint   Patient presents with     Prenatal Care     30 / pain in stomach 5 min after waking       Initial BP 98/54   Wt 59.9 kg (132 lb)   LMP 2019 (Exact Date)   BMI 26.66 kg/m   Estimated body mass index is 26.66 kg/m  as calculated from the following:    Height as of 20: 1.499 m (4' 11\").    Weight as of this encounter: 59.9 kg (132 lb).  BP completed using cuff size: regular    Questioned patient about current smoking habits.  Pt. has never smoked.          The following HM Due: NONE    + Fetal Movement  + Slight Swelling  + Headache- occasionally has some tea or coffee and headache resolves     Tami Dan, LEATHA                "

## 2020-05-29 NOTE — PROGRESS NOTES
PROBLEM LIST  LABS: Apos/RI  GIRL    1.  Group B Strep bacteriuria: no test at 36w, treat in labor.    GCT and labs today. Good fetal movement. Discussed kick counts, PTL symptoms.  Miladis Fisher MD

## 2020-05-30 LAB — T PALLIDUM AB SER QL: NONREACTIVE

## 2020-06-09 DIAGNOSIS — O99.713 PRURITUS OF PREGNANCY IN THIRD TRIMESTER: ICD-10-CM

## 2020-06-09 DIAGNOSIS — L29.9 PRURITUS OF PREGNANCY IN THIRD TRIMESTER: ICD-10-CM

## 2020-06-09 LAB
ALBUMIN SERPL-MCNC: 2.8 G/DL (ref 3.4–5)
ALP SERPL-CCNC: 51 U/L (ref 40–150)
ALT SERPL W P-5'-P-CCNC: 21 U/L (ref 0–50)
ANION GAP SERPL CALCULATED.3IONS-SCNC: 10 MMOL/L (ref 3–14)
AST SERPL W P-5'-P-CCNC: 23 U/L (ref 0–45)
BILIRUB SERPL-MCNC: 0.4 MG/DL (ref 0.2–1.3)
BUN SERPL-MCNC: 5 MG/DL (ref 7–30)
CALCIUM SERPL-MCNC: 8.8 MG/DL (ref 8.5–10.1)
CHLORIDE SERPL-SCNC: 98 MMOL/L (ref 94–109)
CO2 SERPL-SCNC: 27 MMOL/L (ref 20–32)
CREAT SERPL-MCNC: 0.6 MG/DL (ref 0.52–1.04)
GFR SERPL CREATININE-BSD FRML MDRD: >90 ML/MIN/{1.73_M2}
GLUCOSE SERPL-MCNC: 127 MG/DL (ref 70–99)
POTASSIUM SERPL-SCNC: 3.8 MMOL/L (ref 3.4–5.3)
PROT SERPL-MCNC: 6.1 G/DL (ref 6.8–8.8)
SODIUM SERPL-SCNC: 135 MMOL/L (ref 133–144)

## 2020-06-09 PROCEDURE — 36415 COLL VENOUS BLD VENIPUNCTURE: CPT | Performed by: OBSTETRICS & GYNECOLOGY

## 2020-06-09 PROCEDURE — 99000 SPECIMEN HANDLING OFFICE-LAB: CPT | Performed by: OBSTETRICS & GYNECOLOGY

## 2020-06-09 PROCEDURE — 83789 MASS SPECTROMETRY QUAL/QUAN: CPT | Mod: 90 | Performed by: OBSTETRICS & GYNECOLOGY

## 2020-06-09 PROCEDURE — 80053 COMPREHEN METABOLIC PANEL: CPT | Performed by: OBSTETRICS & GYNECOLOGY

## 2020-06-12 ENCOUNTER — PRENATAL OFFICE VISIT (OUTPATIENT)
Dept: OBGYN | Facility: CLINIC | Age: 30
End: 2020-06-12
Payer: COMMERCIAL

## 2020-06-12 VITALS — DIASTOLIC BLOOD PRESSURE: 76 MMHG | WEIGHT: 135 LBS | SYSTOLIC BLOOD PRESSURE: 112 MMHG | BODY MASS INDEX: 27.27 KG/M2

## 2020-06-12 DIAGNOSIS — L29.9 GENERALIZED PRURITUS: Primary | ICD-10-CM

## 2020-06-12 DIAGNOSIS — Z34.80 SUPERVISION OF OTHER NORMAL PREGNANCY, ANTEPARTUM: ICD-10-CM

## 2020-06-12 PROCEDURE — 99207 ZZC PRENATAL VISIT: CPT | Performed by: OBSTETRICS & GYNECOLOGY

## 2020-06-12 NOTE — PROGRESS NOTES
PROBLEM LIST  LABS: Apos/RI  GIRL    1.  Group B Strep bacteriuria: no test at 36w, treat in labor.    Good gm. Intense itching, also on hands and feet. AST/ALT normal, bile acids pending. Discussed IHCP, treatment with ursodiol if bile acids are elevated. Follow up in 2 weeks, but we will call her with results and a plan. Can try benadryl over the counter until then.    Miladis Fisher MD

## 2020-06-12 NOTE — NURSING NOTE
"Chief Complaint   Patient presents with     Prenatal Care       Initial /76   Wt 61.2 kg (135 lb)   LMP 2019 (Exact Date)   Breastfeeding No   BMI 27.27 kg/m   Estimated body mass index is 27.27 kg/m  as calculated from the following:    Height as of 20: 1.499 m (4' 11\").    Weight as of this encounter: 61.2 kg (135 lb).  BP completed using cuff size: regular    Questioned patient about current smoking habits.  Pt. has never smoked.          30w2d  + FM daily  + edema  + itching in hands/feet  + heartburn  - headache  - cramping or bleeding  Beatriz Galloway LPN               "
ABDOMINAL PAIN/left pelvic

## 2020-06-14 LAB
BILE AC SERPL-SCNC: 2.1 UMOL/L (ref 0–2.5)
CDCAE SERPL-SCNC: 0.3 UMOL/L (ref 0–3.4)
CHOLATE SERPL-SCNC: 3 UMOL/L (ref 0–7)
DO-CHOLATE SERPL-SCNC: 0.3 UMOL/L (ref 0–1.9)
URSODEOXYCHOLATE SERPL-SCNC: 0.3 UMOL/L (ref 0–1)

## 2020-06-23 ENCOUNTER — TELEPHONE (OUTPATIENT)
Dept: OBGYN | Facility: CLINIC | Age: 30
End: 2020-06-23

## 2020-06-23 DIAGNOSIS — R30.0 DYSURIA: Primary | ICD-10-CM

## 2020-06-23 DIAGNOSIS — N89.8 VAGINAL DISCHARGE: ICD-10-CM

## 2020-06-23 NOTE — TELEPHONE ENCOUNTER
Pt calls with painful urination and yellow discharge.    Has appt Thursday.  Doing lab only appt for urine and wet prep tonight.    Routed as kel.    Alyssa SOUZA R.N.

## 2020-06-24 DIAGNOSIS — R30.0 DYSURIA: ICD-10-CM

## 2020-06-24 DIAGNOSIS — N89.8 VAGINAL DISCHARGE: ICD-10-CM

## 2020-06-24 LAB
ALBUMIN UR-MCNC: NEGATIVE MG/DL
APPEARANCE UR: CLEAR
BACTERIA #/AREA URNS HPF: ABNORMAL /HPF
BILIRUB UR QL STRIP: NEGATIVE
COLOR UR AUTO: YELLOW
GLUCOSE UR STRIP-MCNC: NEGATIVE MG/DL
HGB UR QL STRIP: NEGATIVE
KETONES UR STRIP-MCNC: NEGATIVE MG/DL
LEUKOCYTE ESTERASE UR QL STRIP: NEGATIVE
NITRATE UR QL: NEGATIVE
NON-SQ EPI CELLS #/AREA URNS LPF: ABNORMAL /LPF
PH UR STRIP: 7.5 PH (ref 5–7)
RBC #/AREA URNS AUTO: ABNORMAL /HPF
SOURCE: ABNORMAL
SP GR UR STRIP: 1.02 (ref 1–1.03)
SPECIMEN SOURCE: NORMAL
UROBILINOGEN UR STRIP-ACNC: 0.2 EU/DL (ref 0.2–1)
WBC #/AREA URNS AUTO: ABNORMAL /HPF
WET PREP SPEC: NORMAL

## 2020-06-24 PROCEDURE — 81001 URINALYSIS AUTO W/SCOPE: CPT | Performed by: OBSTETRICS & GYNECOLOGY

## 2020-06-24 PROCEDURE — 87088 URINE BACTERIA CULTURE: CPT | Performed by: OBSTETRICS & GYNECOLOGY

## 2020-06-24 PROCEDURE — 87086 URINE CULTURE/COLONY COUNT: CPT | Performed by: OBSTETRICS & GYNECOLOGY

## 2020-06-24 PROCEDURE — 87210 SMEAR WET MOUNT SALINE/INK: CPT | Performed by: OBSTETRICS & GYNECOLOGY

## 2020-06-25 ENCOUNTER — PRENATAL OFFICE VISIT (OUTPATIENT)
Dept: OBGYN | Facility: CLINIC | Age: 30
End: 2020-06-25
Payer: COMMERCIAL

## 2020-06-25 VITALS
DIASTOLIC BLOOD PRESSURE: 60 MMHG | BODY MASS INDEX: 27.54 KG/M2 | HEIGHT: 59 IN | WEIGHT: 136.6 LBS | SYSTOLIC BLOOD PRESSURE: 100 MMHG

## 2020-06-25 DIAGNOSIS — Z34.93 PRENATAL CARE IN THIRD TRIMESTER: Primary | ICD-10-CM

## 2020-06-25 PROCEDURE — 99207 ZZC PRENATAL VISIT: CPT | Performed by: FAMILY MEDICINE

## 2020-06-25 PROCEDURE — 59426 ANTEPARTUM CARE ONLY: CPT | Performed by: FAMILY MEDICINE

## 2020-06-25 ASSESSMENT — MIFFLIN-ST. JEOR: SCORE: 1260.24

## 2020-06-25 NOTE — PATIENT INSTRUCTIONS
"Return 2 weeks   Return to clinic:  every 4 weeks till 28 weeks, then every 2 weeks till 36 weeks, then weekly till delivery      Phone numbers [place]:  Day/ night [phone] ask for ob triage  Emergency:  Call labor and delivery:  [phone]    What should I call about??    Contraction every 5 minutes for 1 hour 1 minute long (511), bleeding, loss of fluid, headache that doesn't resolve with tylenol, and decreased fetal movement     Start kick counts @ 26-28 weeks   There is an mina for this!  It is called \"count the kicks\"  Keep track of movement and discover your normal baby movement pattern   guideline is listed below  Please call if you do not feel the baby move!  We will have you come in for fetal heart rate monitoring:   Perception of at least 10 FMs during 12 hours of normal maternal activity   Perception of least 10 FMs over two hours when the mother is at rest and focused on [facility] Address   [address], [place], [place] [address] [phone]    Dr. Elva Crowe, DO    OB/GYN   [facility]                                                      "

## 2020-06-25 NOTE — NURSING NOTE
"32w1d    Chief Complaint   Patient presents with     Prenatal Care     having trouble sleeping--baby is very active--wondering if she can sleep on right--taking unisom--was tested for UTI and it came back normal but still having pain       Initial /60   Ht 1.499 m (4' 11\")   Wt 62 kg (136 lb 9.6 oz)   LMP 2019 (Exact Date)   BMI 27.59 kg/m   Estimated body mass index is 27.59 kg/m  as calculated from the following:    Height as of this encounter: 1.499 m (4' 11\").    Weight as of this encounter: 62 kg (136 lb 9.6 oz).  BP completed using cuff size: regular    Questioned patient about current smoking habits.  Pt. has never smoked.          The following HM Due: NONE         "

## 2020-06-25 NOTE — PROGRESS NOTES
"CC: Here for routine prenatal visit   29 year old y/o  @ 32w1d with Estimated Date of Delivery: Aug 19, 2020     /60   Ht 1.499 m (4' 11\")   Wt 62 kg (136 lb 9.6 oz)   LMP 2019 (Exact Date)   BMI 27.59 kg/m    See OB flowsheet  + fetal movement, no contractions, no bleeding, no loss of fluid   Discussed monitoring fetal movement     1) concerns: notices baby slightly less active on 12 mg   UA positive, still with pain with urination, urine culture pending   Covid-19 concerns: none  2) Routine: LABS: Apos/RI  GIRL, nl glucose, tdap [x]  3) Risk factors:   A: GBS bacteruria  4) Return: 2 weeks     [place]    "

## 2020-06-25 NOTE — RESULT ENCOUNTER NOTE
We will await the results of the urine culture with appropriate therapy to follow.  Please notify the patient  Thank you

## 2020-06-26 DIAGNOSIS — R82.71 GBS BACTERIURIA: Primary | ICD-10-CM

## 2020-06-26 LAB
BACTERIA SPEC CULT: ABNORMAL
BACTERIA SPEC CULT: ABNORMAL
SPECIMEN SOURCE: ABNORMAL

## 2020-06-26 RX ORDER — PENICILLIN V POTASSIUM 500 MG/1
500 TABLET, FILM COATED ORAL 3 TIMES DAILY
Qty: 21 TABLET | Refills: 0 | Status: SHIPPED | OUTPATIENT
Start: 2020-06-26 | End: 2020-07-23

## 2020-06-29 ENCOUNTER — HOSPITAL ENCOUNTER (OUTPATIENT)
Dept: ULTRASOUND IMAGING | Facility: CLINIC | Age: 30
Discharge: HOME | End: 2020-06-29
Attending: FAMILY MEDICINE | Admitting: FAMILY MEDICINE
Payer: COMMERCIAL

## 2020-06-29 DIAGNOSIS — Z34.93 PRENATAL CARE IN THIRD TRIMESTER: ICD-10-CM

## 2020-06-29 PROCEDURE — 76816 OB US FOLLOW-UP PER FETUS: CPT

## 2020-07-09 ENCOUNTER — PRENATAL OFFICE VISIT (OUTPATIENT)
Dept: OBGYN | Facility: CLINIC | Age: 30
End: 2020-07-09
Payer: COMMERCIAL

## 2020-07-09 VITALS — SYSTOLIC BLOOD PRESSURE: 94 MMHG | BODY MASS INDEX: 28.07 KG/M2 | DIASTOLIC BLOOD PRESSURE: 60 MMHG | WEIGHT: 139 LBS

## 2020-07-09 DIAGNOSIS — R82.71 GBS BACTERIURIA: Primary | ICD-10-CM

## 2020-07-09 DIAGNOSIS — Z34.93 PRENATAL CARE IN THIRD TRIMESTER: ICD-10-CM

## 2020-07-09 PROCEDURE — 99212 OFFICE O/P EST SF 10 MIN: CPT | Performed by: OBSTETRICS & GYNECOLOGY

## 2020-07-09 NOTE — NURSING NOTE
"Chief Complaint   Patient presents with     Prenatal Care       Initial BP 94/60   Wt 63 kg (139 lb)   LMP 2019 (Exact Date)   Breastfeeding No   BMI 28.07 kg/m   Estimated body mass index is 28.07 kg/m  as calculated from the following:    Height as of 20: 1.499 m (4' 11\").    Weight as of this encounter: 63 kg (139 lb).  BP completed using cuff size: regular    Questioned patient about current smoking habits.  Pt. has never smoked.          34w1d  + FM daily  + cramping   - bleeding or leaking of fluid  + heartburn  - headache  + mild edema in feet  Beatriz Galloway LPN               "

## 2020-07-09 NOTE — PROGRESS NOTES
PROBLEM LIST  LABS: Apos/RI/GBS positive GIRL     1.  Group B Strep bacteriuria: no test at 36w, treat in labor.    Feels well overall, tired at times. Good fetal movement, no leakage of fluid or vaginal bleeding.    Miladis Fisher MD

## 2020-07-23 ENCOUNTER — PRENATAL OFFICE VISIT (OUTPATIENT)
Dept: OBGYN | Facility: CLINIC | Age: 30
End: 2020-07-23
Payer: COMMERCIAL

## 2020-07-23 VITALS — WEIGHT: 140 LBS | DIASTOLIC BLOOD PRESSURE: 60 MMHG | SYSTOLIC BLOOD PRESSURE: 92 MMHG | BODY MASS INDEX: 28.28 KG/M2

## 2020-07-23 DIAGNOSIS — R82.71 GBS BACTERIURIA: Primary | ICD-10-CM

## 2020-07-23 DIAGNOSIS — Z34.93 PRENATAL CARE IN THIRD TRIMESTER: ICD-10-CM

## 2020-07-23 PROCEDURE — 99212 OFFICE O/P EST SF 10 MIN: CPT | Performed by: OBSTETRICS & GYNECOLOGY

## 2020-07-23 NOTE — NURSING NOTE
"Chief Complaint   Patient presents with     Prenatal Care       Initial BP 92/60   Wt 63.5 kg (140 lb)   LMP 2019 (Exact Date)   Breastfeeding No   BMI 28.28 kg/m   Estimated body mass index is 28.28 kg/m  as calculated from the following:    Height as of 20: 1.499 m (4' 11\").    Weight as of this encounter: 63.5 kg (140 lb).  BP completed using cuff size: regular    Questioned patient about current smoking habits.  Pt. has never smoked.          36w1d  + FM daily  - cramping or bleeding  + noe jennings contractions  - edema  + heartburn  Beatriz Galloway LPN               "

## 2020-07-23 NOTE — PROGRESS NOTES
PROBLEM LIST  LABS: Apos/RI/GBS positive GIRL     1.  Group B Strep bacteriuria: no test at 36w, treat in labor.    Feels well overall, tired at times. Good fetal movement, no leakage of fluid or vaginal bleeding. Some BH contractions, nothing painful.  Signs and symptoms of labor reviewed, including when to call or come in for evaluation.       Miladis Fisher MD

## 2020-07-28 ENCOUNTER — TELEPHONE (OUTPATIENT)
Dept: OBGYN | Facility: CLINIC | Age: 30
End: 2020-07-28

## 2020-07-28 NOTE — TELEPHONE ENCOUNTER
Pt calling. She has been noticing contractions off and on for the last 3 hrs. The closest they have gotten is 15 min apart, but they are not consistent.    Denies leaking of watery fluid from the vagina.   Denies vag bleeding.   Baby is active.     Pt advised to make sure that she is drinking plenty of water.     Advised to call back if contractions become 5-7 min apart consistently for 1 hr, and/or any vag bleeding or leaking of watery fluid.     Next pn appt is on 7/30/20.    Yola SWENSON RN

## 2020-07-30 ENCOUNTER — PRENATAL OFFICE VISIT (OUTPATIENT)
Dept: OBGYN | Facility: CLINIC | Age: 30
End: 2020-07-30
Payer: COMMERCIAL

## 2020-07-30 VITALS — BODY MASS INDEX: 28.48 KG/M2 | DIASTOLIC BLOOD PRESSURE: 60 MMHG | SYSTOLIC BLOOD PRESSURE: 112 MMHG | WEIGHT: 141 LBS

## 2020-07-30 DIAGNOSIS — Z34.93 PRENATAL CARE IN THIRD TRIMESTER: ICD-10-CM

## 2020-07-30 DIAGNOSIS — R82.71 GBS BACTERIURIA: ICD-10-CM

## 2020-07-30 PROCEDURE — 99212 OFFICE O/P EST SF 10 MIN: CPT | Performed by: OBSTETRICS & GYNECOLOGY

## 2020-07-30 NOTE — NURSING NOTE
"Chief Complaint   Patient presents with     Prenatal Care       Initial /60   Wt 64 kg (141 lb)   LMP 2019 (Exact Date)   Breastfeeding No   BMI 28.48 kg/m   Estimated body mass index is 28.48 kg/m  as calculated from the following:    Height as of 20: 1.499 m (4' 11\").    Weight as of this encounter: 64 kg (141 lb).  BP completed using cuff size: regular    Questioned patient about current smoking habits.  Pt. has never smoked.        37w1d  + FM daily  + contractions  - bleeding or leaking of fluid  Beatriz Galloway LPN               "

## 2020-07-30 NOTE — PROGRESS NOTES
PROBLEM LIST  LABS: Apos/RI/GBS positive GIRL     1.  Group B Strep bacteriuria: no test at 36w, treat in labor.    Feels well overall, tired at times. Good fetal movement, no leakage of fluid or vaginal bleeding. Some painful contractions this week, last 2 days--never closer than 15 mins apart.  Signs and symptoms of labor reviewed, including when to call or come in for evaluation.       Miladis Fisher MD

## 2020-08-04 ENCOUNTER — HOSPITAL ENCOUNTER (INPATIENT)
Facility: CLINIC | Age: 30
LOS: 3 days | Discharge: HOME | End: 2020-08-07
Attending: OBSTETRICS & GYNECOLOGY | Admitting: OBSTETRICS & GYNECOLOGY
Payer: COMMERCIAL

## 2020-08-04 ENCOUNTER — ANESTHESIA EVENT (OUTPATIENT)
Dept: OBGYN | Facility: CLINIC | Age: 30
End: 2020-08-04
Payer: COMMERCIAL

## 2020-08-04 ENCOUNTER — ANESTHESIA (OUTPATIENT)
Dept: OBGYN | Facility: CLINIC | Age: 30
End: 2020-08-04
Payer: COMMERCIAL

## 2020-08-04 ENCOUNTER — TELEPHONE (OUTPATIENT)
Dept: OBGYN | Facility: CLINIC | Age: 30
End: 2020-08-04

## 2020-08-04 PROBLEM — Z36.89 ENCOUNTER FOR TRIAGE IN PREGNANT PATIENT: Status: ACTIVE | Noted: 2020-08-04

## 2020-08-04 LAB
ABO + RH BLD: NORMAL
ABO + RH BLD: NORMAL
HGB BLD-MCNC: 12.7 G/DL (ref 11.7–15.7)
LABORATORY COMMENT REPORT: NORMAL
PLATELET # BLD AUTO: 207 10E9/L (ref 150–450)
RUPTURE OF FETAL MEMBRANES BY ROM PLUS: POSITIVE
SARS-COV-2 RNA SPEC QL NAA+PROBE: NEGATIVE
SARS-COV-2 RNA SPEC QL NAA+PROBE: NORMAL
SPECIMEN EXP DATE BLD: NORMAL
SPECIMEN SOURCE: NORMAL
SPECIMEN SOURCE: NORMAL

## 2020-08-04 PROCEDURE — 86900 BLOOD TYPING SEROLOGIC ABO: CPT | Performed by: OBSTETRICS & GYNECOLOGY

## 2020-08-04 PROCEDURE — G0463 HOSPITAL OUTPT CLINIC VISIT: HCPCS

## 2020-08-04 PROCEDURE — 40000671 ZZH STATISTIC ANESTHESIA CASE

## 2020-08-04 PROCEDURE — 37000011 ZZH ANESTHESIA WARD SERVICE

## 2020-08-04 PROCEDURE — 84112 EVAL AMNIOTIC FLUID PROTEIN: CPT | Performed by: OBSTETRICS & GYNECOLOGY

## 2020-08-04 PROCEDURE — 85018 HEMOGLOBIN: CPT | Performed by: OBSTETRICS & GYNECOLOGY

## 2020-08-04 PROCEDURE — 00HU33Z INSERTION OF INFUSION DEVICE INTO SPINAL CANAL, PERCUTANEOUS APPROACH: ICD-10-PCS | Performed by: ANESTHESIOLOGY

## 2020-08-04 PROCEDURE — 25000125 ZZHC RX 250: Performed by: ANESTHESIOLOGY

## 2020-08-04 PROCEDURE — 3E0R3BZ INTRODUCTION OF ANESTHETIC AGENT INTO SPINAL CANAL, PERCUTANEOUS APPROACH: ICD-10-PCS | Performed by: ANESTHESIOLOGY

## 2020-08-04 PROCEDURE — 25000128 H RX IP 250 OP 636: Performed by: ANESTHESIOLOGY

## 2020-08-04 PROCEDURE — 36415 COLL VENOUS BLD VENIPUNCTURE: CPT | Performed by: OBSTETRICS & GYNECOLOGY

## 2020-08-04 PROCEDURE — 86901 BLOOD TYPING SEROLOGIC RH(D): CPT | Performed by: OBSTETRICS & GYNECOLOGY

## 2020-08-04 PROCEDURE — U0003 INFECTIOUS AGENT DETECTION BY NUCLEIC ACID (DNA OR RNA); SEVERE ACUTE RESPIRATORY SYNDROME CORONAVIRUS 2 (SARS-COV-2) (CORONAVIRUS DISEASE [COVID-19]), AMPLIFIED PROBE TECHNIQUE, MAKING USE OF HIGH THROUGHPUT TECHNOLOGIES AS DESCRIBED BY CMS-2020-01-R: HCPCS | Performed by: OBSTETRICS & GYNECOLOGY

## 2020-08-04 PROCEDURE — 25800030 ZZH RX IP 258 OP 636: Performed by: ANESTHESIOLOGY

## 2020-08-04 PROCEDURE — 12000000 ZZH R&B MED SURG/OB

## 2020-08-04 PROCEDURE — 85049 AUTOMATED PLATELET COUNT: CPT | Performed by: OBSTETRICS & GYNECOLOGY

## 2020-08-04 PROCEDURE — 86780 TREPONEMA PALLIDUM: CPT | Performed by: OBSTETRICS & GYNECOLOGY

## 2020-08-04 PROCEDURE — 25800030 ZZH RX IP 258 OP 636: Performed by: OBSTETRICS & GYNECOLOGY

## 2020-08-04 PROCEDURE — 25000128 H RX IP 250 OP 636: Performed by: OBSTETRICS & GYNECOLOGY

## 2020-08-04 PROCEDURE — 25000125 ZZHC RX 250: Performed by: OBSTETRICS & GYNECOLOGY

## 2020-08-04 RX ORDER — FENTANYL CITRATE 50 UG/ML
50-100 INJECTION, SOLUTION INTRAMUSCULAR; INTRAVENOUS
Status: DISCONTINUED | OUTPATIENT
Start: 2020-08-04 | End: 2020-08-05

## 2020-08-04 RX ORDER — PENICILLIN G POTASSIUM 5000000 [IU]/1
5 INJECTION, POWDER, FOR SOLUTION INTRAMUSCULAR; INTRAVENOUS ONCE
Status: COMPLETED | OUTPATIENT
Start: 2020-08-04 | End: 2020-08-04

## 2020-08-04 RX ORDER — OXYTOCIN/0.9 % SODIUM CHLORIDE 30/500 ML
100-340 PLASTIC BAG, INJECTION (ML) INTRAVENOUS CONTINUOUS PRN
Status: COMPLETED | OUTPATIENT
Start: 2020-08-04 | End: 2020-08-05

## 2020-08-04 RX ORDER — NALOXONE HYDROCHLORIDE 0.4 MG/ML
.1-.4 INJECTION, SOLUTION INTRAMUSCULAR; INTRAVENOUS; SUBCUTANEOUS
Status: DISCONTINUED | OUTPATIENT
Start: 2020-08-04 | End: 2020-08-05 | Stop reason: HOSPADM

## 2020-08-04 RX ORDER — LIDOCAINE HYDROCHLORIDE AND EPINEPHRINE 15; 5 MG/ML; UG/ML
INJECTION, SOLUTION EPIDURAL PRN
Status: DISCONTINUED | OUTPATIENT
Start: 2020-08-04 | End: 2020-08-04

## 2020-08-04 RX ORDER — ACETAMINOPHEN 325 MG/1
650 TABLET ORAL EVERY 4 HOURS PRN
Status: DISCONTINUED | OUTPATIENT
Start: 2020-08-04 | End: 2020-08-05

## 2020-08-04 RX ORDER — OXYCODONE AND ACETAMINOPHEN 5; 325 MG/1; MG/1
1 TABLET ORAL
Status: DISCONTINUED | OUTPATIENT
Start: 2020-08-04 | End: 2020-08-05

## 2020-08-04 RX ORDER — EPHEDRINE SULFATE 50 MG/ML
5 INJECTION, SOLUTION INTRAMUSCULAR; INTRAVENOUS; SUBCUTANEOUS
Status: DISCONTINUED | OUTPATIENT
Start: 2020-08-04 | End: 2020-08-05 | Stop reason: HOSPADM

## 2020-08-04 RX ORDER — OXYTOCIN/0.9 % SODIUM CHLORIDE 30/500 ML
1-24 PLASTIC BAG, INJECTION (ML) INTRAVENOUS CONTINUOUS
Status: DISCONTINUED | OUTPATIENT
Start: 2020-08-04 | End: 2020-08-05

## 2020-08-04 RX ORDER — LIDOCAINE 40 MG/G
CREAM TOPICAL
Status: DISCONTINUED | OUTPATIENT
Start: 2020-08-04 | End: 2020-08-05

## 2020-08-04 RX ORDER — NALOXONE HYDROCHLORIDE 0.4 MG/ML
.1-.4 INJECTION, SOLUTION INTRAMUSCULAR; INTRAVENOUS; SUBCUTANEOUS
Status: DISCONTINUED | OUTPATIENT
Start: 2020-08-04 | End: 2020-08-05

## 2020-08-04 RX ORDER — NALBUPHINE HYDROCHLORIDE 20 MG/ML
2.5-5 INJECTION, SOLUTION INTRAMUSCULAR; INTRAVENOUS; SUBCUTANEOUS EVERY 6 HOURS PRN
Status: DISCONTINUED | OUTPATIENT
Start: 2020-08-04 | End: 2020-08-05 | Stop reason: HOSPADM

## 2020-08-04 RX ORDER — OXYTOCIN 10 [USP'U]/ML
10 INJECTION, SOLUTION INTRAMUSCULAR; INTRAVENOUS
Status: DISCONTINUED | OUTPATIENT
Start: 2020-08-04 | End: 2020-08-05

## 2020-08-04 RX ORDER — TRANEXAMIC ACID 10 MG/ML
1 INJECTION, SOLUTION INTRAVENOUS EVERY 30 MIN PRN
Status: DISCONTINUED | OUTPATIENT
Start: 2020-08-04 | End: 2020-08-05

## 2020-08-04 RX ORDER — METHYLERGONOVINE MALEATE 0.2 MG/ML
200 INJECTION INTRAVENOUS
Status: DISCONTINUED | OUTPATIENT
Start: 2020-08-04 | End: 2020-08-05

## 2020-08-04 RX ORDER — IBUPROFEN 800 MG/1
800 TABLET, FILM COATED ORAL
Status: COMPLETED | OUTPATIENT
Start: 2020-08-04 | End: 2020-08-05

## 2020-08-04 RX ORDER — ONDANSETRON 2 MG/ML
4 INJECTION INTRAMUSCULAR; INTRAVENOUS EVERY 6 HOURS PRN
Status: DISCONTINUED | OUTPATIENT
Start: 2020-08-04 | End: 2020-08-05

## 2020-08-04 RX ORDER — CARBOPROST TROMETHAMINE 250 UG/ML
250 INJECTION, SOLUTION INTRAMUSCULAR
Status: DISCONTINUED | OUTPATIENT
Start: 2020-08-04 | End: 2020-08-05

## 2020-08-04 RX ORDER — SODIUM CHLORIDE, SODIUM LACTATE, POTASSIUM CHLORIDE, CALCIUM CHLORIDE 600; 310; 30; 20 MG/100ML; MG/100ML; MG/100ML; MG/100ML
INJECTION, SOLUTION INTRAVENOUS CONTINUOUS
Status: DISCONTINUED | OUTPATIENT
Start: 2020-08-04 | End: 2020-08-05

## 2020-08-04 RX ORDER — BUPIVACAINE HYDROCHLORIDE 2.5 MG/ML
INJECTION, SOLUTION EPIDURAL; INFILTRATION; INTRACAUDAL PRN
Status: DISCONTINUED | OUTPATIENT
Start: 2020-08-04 | End: 2020-08-04

## 2020-08-04 RX ADMIN — Medication 2 MILLI-UNITS/MIN: at 19:39

## 2020-08-04 RX ADMIN — BUPIVACAINE HYDROCHLORIDE 5 ML: 2.5 INJECTION, SOLUTION EPIDURAL; INFILTRATION; INTRACAUDAL at 23:43

## 2020-08-04 RX ADMIN — SODIUM CHLORIDE 2.5 MILLION UNITS: 9 INJECTION, SOLUTION INTRAVENOUS at 22:01

## 2020-08-04 RX ADMIN — BUPIVACAINE HYDROCHLORIDE 5 ML: 2.5 INJECTION, SOLUTION EPIDURAL; INFILTRATION; INTRACAUDAL at 23:46

## 2020-08-04 RX ADMIN — LIDOCAINE HYDROCHLORIDE,EPINEPHRINE BITARTRATE 3 ML: 15; .005 INJECTION, SOLUTION EPIDURAL; INFILTRATION; INTRACAUDAL; PERINEURAL at 23:40

## 2020-08-04 RX ADMIN — PENICILLIN G POTASSIUM 5 MILLION UNITS: 5000000 POWDER, FOR SOLUTION INTRAMUSCULAR; INTRAPLEURAL; INTRATHECAL; INTRAVENOUS at 18:03

## 2020-08-04 RX ADMIN — SODIUM CHLORIDE, POTASSIUM CHLORIDE, SODIUM LACTATE AND CALCIUM CHLORIDE: 600; 310; 30; 20 INJECTION, SOLUTION INTRAVENOUS at 18:03

## 2020-08-04 RX ADMIN — Medication: at 23:57

## 2020-08-04 RX ADMIN — SODIUM CHLORIDE, POTASSIUM CHLORIDE, SODIUM LACTATE AND CALCIUM CHLORIDE 1000 ML: 600; 310; 30; 20 INJECTION, SOLUTION INTRAVENOUS at 23:31

## 2020-08-04 NOTE — H&P
H&P Update 2020     No significant change in general health status based on examination of the patient, review of Nursing Admission Database and prenatal record.    30 year old  at 37w6d presented with leakage of fluid since the morning.  ROM+ positive and exam c/w leakage of amniotic fluid.   GBS bacteriuria, plan to treat with PCN in labor.  Pt unable to tolerate pelvic exams.  Last known to be 1cm dilated in clinic.  Plan to start pitocin now as she has been ruptured >8h, place early epidural, and check cervix once comfortable.  EFW AGA.    Admit for PROM    Angeline Pratt MD, MPH  [facility]

## 2020-08-04 NOTE — TELEPHONE ENCOUNTER
Pt calling back with update. She was still having some clear leaking of fluid from her vagina. No odor.    Pt advised to go to [facility] L&C to r/o ROM.    L&D and Dr. Pratt notified.     Yola SWENSON RN

## 2020-08-04 NOTE — PROGRESS NOTES
ROM plus was positive. Dr. Farnaz Pratt notified of EFM, ROM plus, patient refusing vaginal exam, and orders received for pitocin. Pt may get an early epidural then check cervix but can defer until that time. Pt agrees with plan.

## 2020-08-04 NOTE — PROGRESS NOTES
Data: [facility] to [facility]: 2020  4:56 PM.  Reason for maternal/fetal assessment is leaking vaginal fluid. Patient reports leaking of fluid since this morning.  She states she has noticed some uterine contractions but nothing painful or regular Patient is a .  Prenatal record reviewed. Pregnancy has been uncomplicated..  Gestational Age 37w6d. VSS. Fetal movement present. Patient denies leaking of vaginal fluid/rupture of membranes, vaginal bleeding, abdominal pain, pelvic pressure, nausea, vomiting, headache, visual disturbances, epigastric or URQ pain, significant edema. Support person is not present.   Action: Verbal consent for EFM. Triage assessment completed. Bill of rights reviewed. ROM plus sent. Vaginal exam deferred due to patient request.  Response: Patient verbalized agreement with plan. Will contact Dr Angeline Pratt with update and further orders.

## 2020-08-04 NOTE — TELEPHONE ENCOUNTER
Pt calling. She has been having some watery/mucousy discharge off and on since yesterday.     I recommended that she lay on her left side for 30 min. If she continues to have leaking of watery fluid, she should call us back and we will send her in to L&D.    Baby is active.   No vag bleeding noted.    Yola SWENSON RN

## 2020-08-05 PROBLEM — Z36.89 ENCOUNTER FOR TRIAGE IN PREGNANT PATIENT: Status: RESOLVED | Noted: 2020-08-04 | Resolved: 2020-08-05

## 2020-08-05 LAB — T PALLIDUM AB SER QL: NONREACTIVE

## 2020-08-05 PROCEDURE — 59410 OBSTETRICAL CARE: CPT | Performed by: OBSTETRICS & GYNECOLOGY

## 2020-08-05 PROCEDURE — 25000128 H RX IP 250 OP 636: Performed by: OBSTETRICS & GYNECOLOGY

## 2020-08-05 PROCEDURE — 25000128 H RX IP 250 OP 636: Performed by: ANESTHESIOLOGY

## 2020-08-05 PROCEDURE — 12000000 ZZH R&B MED SURG/OB

## 2020-08-05 PROCEDURE — 0KQM0ZZ REPAIR PERINEUM MUSCLE, OPEN APPROACH: ICD-10-PCS | Performed by: OBSTETRICS & GYNECOLOGY

## 2020-08-05 PROCEDURE — 25000132 ZZH RX MED GY IP 250 OP 250 PS 637: Performed by: OBSTETRICS & GYNECOLOGY

## 2020-08-05 PROCEDURE — 88307 TISSUE EXAM BY PATHOLOGIST: CPT | Mod: 26 | Performed by: OBSTETRICS & GYNECOLOGY

## 2020-08-05 PROCEDURE — 72200001 ZZH LABOR CARE VAGINAL DELIVERY SINGLE

## 2020-08-05 PROCEDURE — 88307 TISSUE EXAM BY PATHOLOGIST: CPT | Performed by: OBSTETRICS & GYNECOLOGY

## 2020-08-05 PROCEDURE — 25000125 ZZHC RX 250: Performed by: OBSTETRICS & GYNECOLOGY

## 2020-08-05 PROCEDURE — 25800030 ZZH RX IP 258 OP 636: Performed by: OBSTETRICS & GYNECOLOGY

## 2020-08-05 RX ORDER — OXYTOCIN/0.9 % SODIUM CHLORIDE 30/500 ML
100 PLASTIC BAG, INJECTION (ML) INTRAVENOUS CONTINUOUS
Status: DISCONTINUED | OUTPATIENT
Start: 2020-08-05 | End: 2020-08-07 | Stop reason: HOSPADM

## 2020-08-05 RX ORDER — MISOPROSTOL 200 UG/1
800 TABLET ORAL
Status: DISCONTINUED | OUTPATIENT
Start: 2020-08-05 | End: 2020-08-07 | Stop reason: HOSPADM

## 2020-08-05 RX ORDER — OXYTOCIN/0.9 % SODIUM CHLORIDE 30/500 ML
340 PLASTIC BAG, INJECTION (ML) INTRAVENOUS CONTINUOUS PRN
Status: DISCONTINUED | OUTPATIENT
Start: 2020-08-05 | End: 2020-08-07 | Stop reason: HOSPADM

## 2020-08-05 RX ORDER — HYDROCORTISONE 2.5 %
CREAM (GRAM) TOPICAL 3 TIMES DAILY PRN
Status: DISCONTINUED | OUTPATIENT
Start: 2020-08-05 | End: 2020-08-07 | Stop reason: HOSPADM

## 2020-08-05 RX ORDER — AMOXICILLIN 250 MG
1 CAPSULE ORAL 2 TIMES DAILY
Status: DISCONTINUED | OUTPATIENT
Start: 2020-08-05 | End: 2020-08-07 | Stop reason: HOSPADM

## 2020-08-05 RX ORDER — ACETAMINOPHEN 325 MG/1
650 TABLET ORAL EVERY 4 HOURS PRN
Status: DISCONTINUED | OUTPATIENT
Start: 2020-08-05 | End: 2020-08-07 | Stop reason: HOSPADM

## 2020-08-05 RX ORDER — BISACODYL 10 MG
10 SUPPOSITORY, RECTAL RECTAL DAILY PRN
Status: DISCONTINUED | OUTPATIENT
Start: 2020-08-07 | End: 2020-08-07 | Stop reason: HOSPADM

## 2020-08-05 RX ORDER — OXYTOCIN 10 [USP'U]/ML
10 INJECTION, SOLUTION INTRAMUSCULAR; INTRAVENOUS
Status: DISCONTINUED | OUTPATIENT
Start: 2020-08-05 | End: 2020-08-07 | Stop reason: HOSPADM

## 2020-08-05 RX ORDER — MODIFIED LANOLIN
OINTMENT (GRAM) TOPICAL
Status: DISCONTINUED | OUTPATIENT
Start: 2020-08-05 | End: 2020-08-07 | Stop reason: HOSPADM

## 2020-08-05 RX ORDER — IBUPROFEN 600 MG/1
600 TABLET, FILM COATED ORAL EVERY 6 HOURS PRN
Status: DISCONTINUED | OUTPATIENT
Start: 2020-08-05 | End: 2020-08-07 | Stop reason: HOSPADM

## 2020-08-05 RX ORDER — AMOXICILLIN 250 MG
2 CAPSULE ORAL 2 TIMES DAILY
Status: DISCONTINUED | OUTPATIENT
Start: 2020-08-05 | End: 2020-08-07 | Stop reason: HOSPADM

## 2020-08-05 RX ORDER — NALOXONE HYDROCHLORIDE 0.4 MG/ML
.1-.4 INJECTION, SOLUTION INTRAMUSCULAR; INTRAVENOUS; SUBCUTANEOUS
Status: DISCONTINUED | OUTPATIENT
Start: 2020-08-05 | End: 2020-08-07 | Stop reason: HOSPADM

## 2020-08-05 RX ORDER — TRANEXAMIC ACID 10 MG/ML
1 INJECTION, SOLUTION INTRAVENOUS EVERY 30 MIN PRN
Status: DISCONTINUED | OUTPATIENT
Start: 2020-08-05 | End: 2020-08-07 | Stop reason: HOSPADM

## 2020-08-05 RX ADMIN — SODIUM CHLORIDE 2.5 MILLION UNITS: 9 INJECTION, SOLUTION INTRAVENOUS at 10:03

## 2020-08-05 RX ADMIN — Medication: at 09:11

## 2020-08-05 RX ADMIN — ACETAMINOPHEN 650 MG: 325 TABLET, FILM COATED ORAL at 22:22

## 2020-08-05 RX ADMIN — IBUPROFEN 800 MG: 800 TABLET, FILM COATED ORAL at 17:40

## 2020-08-05 RX ADMIN — SODIUM CHLORIDE, POTASSIUM CHLORIDE, SODIUM LACTATE AND CALCIUM CHLORIDE 500 ML: 600; 310; 30; 20 INJECTION, SOLUTION INTRAVENOUS at 14:27

## 2020-08-05 RX ADMIN — Medication 340 ML/HR: at 15:54

## 2020-08-05 RX ADMIN — ACETAMINOPHEN 650 MG: 325 TABLET, FILM COATED ORAL at 17:56

## 2020-08-05 RX ADMIN — SODIUM CHLORIDE 2.5 MILLION UNITS: 9 INJECTION, SOLUTION INTRAVENOUS at 14:33

## 2020-08-05 RX ADMIN — SODIUM CHLORIDE 2.5 MILLION UNITS: 9 INJECTION, SOLUTION INTRAVENOUS at 06:01

## 2020-08-05 RX ADMIN — SODIUM CHLORIDE, POTASSIUM CHLORIDE, SODIUM LACTATE AND CALCIUM CHLORIDE: 600; 310; 30; 20 INJECTION, SOLUTION INTRAVENOUS at 04:46

## 2020-08-05 RX ADMIN — SODIUM CHLORIDE 2.5 MILLION UNITS: 9 INJECTION, SOLUTION INTRAVENOUS at 01:50

## 2020-08-05 RX ADMIN — DOCUSATE SODIUM AND SENNOSIDES 1 TABLET: 8.6; 5 TABLET ORAL at 22:22

## 2020-08-05 NOTE — PROVIDER NOTIFICATION
08/04/20 3649   Provider Notification   Provider Name/Title Dr. Pratt   Method of Notification Phone   Updated with FHR, Contractions, and that patient has not yet requested epidural. Continue current plan of care and update MD as needed.

## 2020-08-05 NOTE — PROGRESS NOTES
[facility] L&D Labor Note    Marisel Perry MRN# [id number]   Age: 30 year old YOB: 1990           Subjective:     Pt comfortable with epidural working.          Objective:     Patient Vitals for the past 8 hrs:   BP Temp Temp src Resp SpO2   08/05/20 0800 96/54 98.3  F (36.8  C) Oral 18 --   08/05/20 0730 127/68 -- -- 16 100 %   08/05/20 0659 -- -- -- -- 100 %   08/05/20 0658 105/68 -- -- -- --   08/05/20 0647 -- 98.4  F (36.9  C) Oral 16 --   08/05/20 0600 118/63 -- -- -- 100 %   08/05/20 0542 -- 97.6  F (36.4  C) Oral -- --   08/05/20 0530 106/59 -- -- 16 100 %   08/05/20 0500 113/65 -- -- -- 100 %   08/05/20 0435 -- 97.9  F (36.6  C) Oral -- 100 %   08/05/20 0429 120/63 -- -- 16 100 %   08/05/20 0400 116/62 -- -- -- 100 %   08/05/20 0341 -- 98.1  F (36.7  C) Oral 16 --   08/05/20 0330 -- -- -- -- 100 %   08/05/20 0304 -- -- -- -- 100 %   08/05/20 0259 112/69 -- -- -- 100 %   08/05/20 0239 -- 97.7  F (36.5  C) Oral -- 100 %   08/05/20 0234 -- -- -- -- 100 %   08/05/20 0228 108/73 -- -- -- --   08/05/20 0140 -- 97.6  F (36.4  C) Oral -- --   08/05/20 0130 -- -- -- -- 100 %   08/05/20 0119 118/59 -- -- -- --   08/05/20 0118 -- -- -- -- 100 %        Cervical Exam: 4/80%/-4/Vtx  Pelvis clinically with small inlet, potentially inadequate    Membranes: SROM at 9:00 AM 8/4/2020; AROM forebag done with clear fluid noted    Fetal Heart Rate: 140, Cat 2 due to minimal variability, no decels    [place]: UCs Q2-3 min on Pitocin          Assessment:   1)  IUP at 38w0d    2)  Prolonged SROM - no sx's Triple I    3)  Latent phase vs early active phase labor - protracted, possibly due to inadequate labor vs inadequate pelvis, no appreciable Cx change in last 9 hours.    4)  Borderline pelvis - Pt is 5 feet tall and petite in stature    5)  GBS Pos - on PCN    6)  COVID-19 Neg          Plan:   1)  IUPC placed after forebag AROM'd    2)  Titrate Pitocin to achieve and maintain adequate labor    3)  Recheck Cx  after 2 hours of adequate labor.  If no Cx change, presume CPD causing arrest of active phase, and would recommend C/S at that time.  Pt and FOB agree with plan.      Davis Prince MD

## 2020-08-05 NOTE — PROVIDER NOTIFICATION
08/04/20 1959   Provider Notification   Provider Name/Title Dr. Green   Method of Notification At Bedside   Request Evaluate in Person   Notification Reason Pain     MDA at bedside for epidural placement.

## 2020-08-05 NOTE — ANESTHESIA PREPROCEDURE EVALUATION
Anesthesia Pre-Procedure Evaluation    Patient: Marisel Perry   MRN: [id number] : 1990          Preoperative Diagnosis: * No surgery found *        Past Medical History:   Diagnosis Date     Hypothyroidism      Primary female infertility      Past Surgical History:   Procedure Laterality Date     ESOPHAGOSCOPY, GASTROSCOPY, DUODENOSCOPY (EGD), COMBINED N/A 2019    Procedure: ESOPHAGOGASTRODUODENOSCOPY, WITH BIOPSY;  Surgeon: Evan Aaron MD;  Location: [facility]     Anesthesia Evaluation       history and physical reviewed .             ROS/MED HX    ENT/Pulmonary:  - neg pulmonary ROS     Neurologic:  - neg neurologic ROS     Cardiovascular:  - neg cardiovascular ROS       METS/Exercise Tolerance:     Hematologic:         Musculoskeletal:         GI/Hepatic:     (+) GERD       Renal/Genitourinary:         Endo:         Psychiatric:         Infectious Disease:         Malignancy:         Other:                     neg OB ROS            Physical Exam  Normal systems: cardiovascular and pulmonary    Airway   Mallampati: II  TM distance: > 3 FB  Neck ROM: full  Mouth opening: > 3 cm    Dental     Cardiovascular       Pulmonary     Other findings: Lab Test        08/04/20     05/29/20     03/10/20     12/19/19     08/20/19                       1750          0957          1547          1008          1610          WBC           --          11.7*         --          9.6          9.3           HGB          12.7         11.4*        10.6*        13.1         11.8          MCV           --          92            --          84           86            PLT          207          212           --          267          256            Lab Test        20                       1214          1008          1631          NA           135          137          136           POTASSIUM    3.8          3.3*         4.1           CHLORIDE     98           101          106          "  CO2          27           27           24            BUN          5*           9            7             CR           0.60         0.80         0.65          ANIONGAP     10           9            6             BRIAN          8.8          9.3          8.8           GLC          127*         107*         82                  Lab Results   Component Value Date    WBC 11.7 (H) 05/29/2020    HGB 12.7 08/04/2020    HCT 34.0 (L) 05/29/2020     08/04/2020     06/09/2020    POTASSIUM 3.8 06/09/2020    CHLORIDE 98 06/09/2020    CO2 27 06/09/2020    BUN 5 (L) 06/09/2020    CR 0.60 06/09/2020     (H) 06/09/2020    BRIAN 8.8 06/09/2020    ALBUMIN 2.8 (L) 06/09/2020    PROTTOTAL 6.1 (L) 06/09/2020    ALT 21 06/09/2020    AST 23 06/09/2020    ALKPHOS 51 06/09/2020    BILITOTAL 0.4 06/09/2020    TSH 3.29 03/10/2020    T4 1.35 08/20/2019    HCG Negative 05/02/2019       Preop Vitals  BP Readings from Last 3 Encounters:   08/04/20 104/57   07/30/20 112/60   07/23/20 92/60    Pulse Readings from Last 3 Encounters:   01/14/20 64   12/19/19 78   11/20/19 56      Resp Readings from Last 3 Encounters:   08/04/20 15   12/19/19 20   11/20/19 13    SpO2 Readings from Last 3 Encounters:   12/19/19 98%   11/20/19 99%   10/02/19 100%      Temp Readings from Last 1 Encounters:   08/04/20 98.2  F (36.8  C) (Oral)    Ht Readings from Last 1 Encounters:   06/25/20 1.499 m (4' 11\")      Wt Readings from Last 1 Encounters:   07/30/20 64 kg (141 lb)    Estimated body mass index is 28.48 kg/m  as calculated from the following:    Height as of 6/25/20: 1.499 m (4' 11\").    Weight as of 7/30/20: 64 kg (141 lb).       Anesthesia Plan      History & Physical Review      ASA Status:  .  OB Epidural Asa: 2            Postoperative Care      Consents  Anesthetic plan, risks, benefits and alternatives discussed with:  Patient..                 Mateto Green MD                    .  "

## 2020-08-05 NOTE — ANESTHESIA PROCEDURE NOTES
Procedure note : epidural catheter  Staff -   Anesthesiologist:  Matteo Green MD      Performed By: anesthesiologist    Referred By: patricia    Pre-Procedure  Performed by Matteo Green MD  Referred by patricia  Location: OB      Pre-Anesthestic Checklist: patient identified, IV checked, site marked, risks and benefits discussed, informed consent, monitors and equipment checked, pre-op evaluation, at physician/surgeon's request and post-op pain management    Timeout  Correct Patient: Yes   Correct Procedure: Yes   Correct Site: Yes   Correct Laterality: Yes   Correct Position: Yes   Site Marked: Yes   .   Procedure Documentation    .    Procedure: epidural catheter, .       .  .        Assessment/Narrative  .  .  . Comments:  Patient desires Labor Epidural for labor analgesia. Vaginal delivery anticipated.    Chart reviewed. Patient examined. No changes to pre procedure chart review. Risks including but not limited to bleeding, infection, nerve injury, PDPH, intrathecal injection, high block, incomplete block, one-sided block, back pain, and low blood pressure discussed in detail. Questions answered. Consent signed.    Pause for the Cause completed. NIBP and pulse ox functioning. L&D nurse present.    Procedure: Sitting. Betadine prep x 3. Sterile drape applied.  Lidocaine 1% x 2 cc local infiltration at L 3-4.  17 G. Tu needle ML KAMLA 1 attempt.  No CSF, paresthesia or blood. 20 g. Epidural catheter inserted w/o resistance 5 cm.  Negative aspiration for CSF and blood. Filter in line.  Test dose Lidocaine 1.5% w/ 1:200,000 epi x 3 cc injected. Negative for neuro change or symptoms of intravascular injection.  Bolus dose: Marcaine 0.25% 5cc x 2 doses (10 cc total).  Infusion orders written.    I or my partner am immediately available. I or my partner will monitor the patient and supervise nursing care at necessary intervals.    JAKollitzMD

## 2020-08-05 NOTE — PLAN OF CARE
Data: Marisel Perry transferred to [facility] via wheelchair at 1800. Baby transferred via parent's arms.  Action: Receiving unit notified of transfer: Yes. Patient and family notified of room change. Report given to CAMERON Jaime at 1800. Belongings sent to receiving unit. Accompanied by Registered Nurse. Oriented patient to surroundings. Call light within reach. ID bands double-checked with receiving RN.  Response: Patient tolerated transfer and is stable.

## 2020-08-05 NOTE — PROVIDER NOTIFICATION
08/05/20 1423   Provider Notification   Provider Name/Title Dr. Gilliam   Method of Notification Phone   Request Evaluate - Remote   Notification Reason Status Update;Decels   Dr. Gilliam paged and updated regarding recurrent late decelerations, fetal baseline change, pitocin off and progress with pushing/station. Order's received for reposition, fluid bolus and continue pushing at this time.

## 2020-08-05 NOTE — PROVIDER NOTIFICATION
08/05/20 0357   Provider Notification   Provider Name/Title Dr. Farnaz Pratt   Method of Notification Phone   Request Evaluate - Remote   Notification Reason SVE;Status Update;Uterine Activity;Decels     MD updated. Pt received epidural at 2340, epidural providing excellent pain relief, she tolerated an exam well at 0030, 3.5/80/-2. Pitocin remains at 6 milliunits/min, Pitocin has not been increased since 2100 last evening. Pt not in a regular contraction pattern, she has 3-5 contractions without adequate resting time, followed by a space of 5-7 minutes. FHR is primarily category 1, however during periods of inadequate uterine resting time, fetus has recurrent variable or late decels. Fetus had a 5 minute prolonged decel at 0330 which resolved with position change. SVE performed at that time, unchanged from previous exam, 3.5/80/-2. FHR now with minimal variability, no decels. Pt afebrile, membranes now ruptured 19 hours. MD states to continue to observe, will perform next SVE on days and may place internal monitors at that time.

## 2020-08-05 NOTE — PROVIDER NOTIFICATION
[id number]   Comments   Comments Pushing stopped pt to right side   Stopped pushing efforts. Pt pushing effectively. Waiting for  to come evaluate for vacuum vs. . Dr. Prince in OR, is aware of R.

## 2020-08-05 NOTE — L&D DELIVERY NOTE
OB Vaginal Delivery Note    Marisel Perry MRN# [id number]   Age: 30 year old YOB: 1990       GA: 38w0d  GP:   Labor Complications: GBS;Prolonged PROM (>18 hours);Dysfunctional Labor   EBL:   mL  Delivery QBL: 198 mL  Delivery Type: Vaginal, Vacuum (Extractor)   ROM to Delivery Time: (Delivered) Days: 1 Hours: 6 Minutes: 44  Accident Weight:     1 Minute 5 Minute 10 Minute   Apgar Totals: 7   9        TAHMINA DEMARCUS CECI     Delivery Details:  Marisel Perry, a 30 year old  female delivered a viable infant with apgars of 7  and 9 . Patient was fully dilated and pushing after   hours   minutes in active labor. Delivery was via vaginal, vacuum (extractor)  to a sterile field under epidural  anesthesia. Infant delivered in vertex  right  occiput  anterior  position. Anterior and posterior shoulders delivered without difficulty. The cord was clamped, cut twice and 3 vessels  were noted. Cord blood was obtained in routine fashion with the following disposition: lab .      Cord complications: none   Placenta delivered at 2020  3:51 PM . Placental disposition was Pathology . Fundal massage performed and fundus found to be firm.     Episiotomy: none    Perineum, vagina, cervix were inspected, and the following lacerations were noted:   Perineal lacerations: 2nd                Any lacerations were repaired in the usual fashion using 2-O & 3-O Vicryl.    Excellent hemostasis was noted. Needle count correct. Infant and patient in delivery room in good and stable condition.        Labor Event Times    Labor onset date:  20 Onset time:  11:40 PM   Dilation complete date:  20 Complete time:  12:30 PM      Labor Length    1st Stage (hrs):  12 (min):  50   2nd Stage (hrs):  3 (min):  14   3rd Stage (hrs):  0 (min):  7      Labor Events     labor?:  No   steroids:  None  Labor Type:  Spontaneous, Augmentation  Predominate monitoring during 1st stage:  continuous  electronic fetal monitoring     Antibiotics received during labor?:  Yes  Reason for Antibiotics:  GBS  Antibiotics received for GBS:  Penicillin  Antibiotics Given (GBS):  Greater than 4 hours prior to delivery     Rupture date/time: 20 0900   Rupture type:  Spontaneous rupture of membranes prior to induction  Fluid color:  Clear  Fluid odor:  Normal     Augmentation:  Oxytocin  Augmentation date/time:  20   Indications for augmentation:  Prolonged ROM     Delivery/Placenta Date and Time    Delivery Date:  20 Delivery Time:   3:44 PM   Placenta Date/Time:  2020  3:51 PM  Oxytocin given at the time of delivery:  after delivery of baby     Vaginal Counts     Initial count performed by 2 team members:   Two Team Members   CAMERON Merida       Needles Suture Warren Sponges Instruments   Initial counts 2  5    Added to count  2     Final counts 2 2 5    Placed during labor Accounted for at the end of labor   NA    NA    NA     Final count performed by 2 team members:   Two Team Members   CAMERON Trejo Dr      Final count correct?:  Yes     Apgars    Living status:  Living   1 Minute 5 Minute 10 Minute 15 Minute 20 Minute   Skin color: 0  1       Heart rate: 2  2       Reflex irritability: 2  2       Muscle tone: 1  2       Respiratory effort: 2  2       Total: 7  9       Apgars assigned by:  JUAN MANUEL WINSLOW,GILBERTO     Cord    Vessels:  3 Vessels Complications:  None   Cord Blood Disposition:  Lab Gases Sent?:  Yes       Resuscitation    Methods:  Suctioning, Oximetry  [place] [facility] at Delivery:  Called by Dr. Prince for variable decels and VE.  Infant placed under radiant warmer with early cord clamping. Infant stimulated with spontaneous weak  cry.  Continued warming, drying with improvement in color, tone and activity over 5 minutes.  Sats >90 at 5 minutes.  Temp 98.1.  GOLDY Lama CNP   2020 , 4:08 PM.      Output in Delivery Room:  Voided       "Measurements    Weight:  7 lb 0.2 oz Length:  1' 7.75\"   Head circumference:  34.3 cm       Skin to Skin and Feeding Plan    Skin to skin initiation date/time:     Skin to skin end date/time:     Reason skin to skin not initiated:  [place] [employer]  How do you plan to feed your baby:  Breastfeeding     Labor Events and Shoulder Dystocia    Fetal Tracing Prior to Delivery:  Category 2  Fetal Tracing Comments:  Fetal  Shoulder dystocia present?:  Neg     Delivery (Maternal) (Provider to Complete) ([id number])    Episiotomy:  None  Perineal lacerations:  2nd Repaired?:  Yes      Blood Loss  Mother: Marisel Perry #[id number]   Start of Mother's Information    IO Blood Loss  20 2340 - 20 1657    Delivery QBL (mL) Hospital Encounter 198 mL    Total  198 mL         End of Mother's Information  Mother: Marisel Perry #[id number]         Delivery - Provider to Complete ([id number])    Delivering clinician:  Davis Prince MD  Attempted Delivery Types (Choose all that apply):  Spontaneous Vaginal Delivery, Vacuum (Extractor)  Delivery Type (Choose the 1 that will go to the Birth History):  Vaginal, Vacuum (Extractor)  Verbal Informed Consent Obtained For Vacuum:  Yes Alternate Labor Strategies Discussed (vacuum):  Yes   Emergency Resources Available (vacuum):  Charge Nurse/Team, Resuscitation Team  Type (vacuum):  [place] Accrued Pulling Time (vacuum):  55 sec   # of Pop-Offs (vacuum):  0 # of Pulls/Contractions (vacuum):  5   Position (vacuum):  OA Fetal Station (vacuum):  +4   Indication for Operative Vaginal Delivery (vacuum):  Maternal Exhaustion, Prolonged 2nd Stage  Operative Vaginal Delivery Brief Note Vacuum:  Prior to attempting vacuum-assisted vaginal delivery, I discussed with patient the indications, risks, benefits, and alternatives of this/these option(s) and the patient understands and has given informed consent.  The bladder was drained via straight catheterization.  After confirmation of fetal " head position, a Low application of the above noted device was performed without difficulty.  With gentle traction and patient pushing, delivery was accomplished without difficulty.  No shoulder dystocia was encountered, and delivery was atraumatic.     Other personnel:   Provider Role   Kathia Bauman, RN Delivery Assist         Placenta    Immediate Cord Clamping:  Done  Date/Time:  8/5/2020  3:51 PM  Removal:  Spontaneous  Disposition:  Pathology     Anesthesia    Method:  Epidural          Presentation and Position    Presentation:  Vertex  Position:  Right Occiput Anterior           Davis Prince MD

## 2020-08-05 NOTE — L&D DELIVERY NOTE
[facility] L&D Labor Note    Marisel Perry MRN# [id number]   Age: 30 year old YOB: 1990           Subjective:     Pt feels some pressure in her pelvis.          Objective:     Patient Vitals for the past 8 hrs:   BP Temp Temp src Resp SpO2   08/05/20 1130 113/56 -- -- -- --   08/05/20 1100 102/52 -- -- -- --   08/05/20 1030 -- 97.8  F (36.6  C) Oral -- --   08/05/20 1000 120/70 -- -- -- --   08/05/20 0930 127/63 -- -- -- --   08/05/20 0900 124/75 98  F (36.7  C) Oral 18 --   08/05/20 0830 92/53 -- -- -- --   08/05/20 0800 96/54 98.3  F (36.8  C) Oral 18 --   08/05/20 0730 127/68 -- -- 16 100 %   08/05/20 0659 -- -- -- -- 100 %   08/05/20 0658 105/68 -- -- -- --   08/05/20 0647 -- 98.4  F (36.9  C) Oral 16 --   08/05/20 0600 118/63 -- -- -- 100 %   08/05/20 0542 -- 97.6  F (36.4  C) Oral -- --   08/05/20 0530 106/59 -- -- 16 100 %   08/05/20 0500 113/65 -- -- -- 100 %   08/05/20 0435 -- 97.9  F (36.6  C) Oral -- 100 %        Cervical Exam: 10/100%/+1/OP    Fetal Heart Rate: Cat 2 - some accels, some minimal variability at times    IUPC: UCs Q 2 min, was > 200 MVUs earlier but IUPC has been partial extruded.          Assessment:   1)  IUP at 38w0d    2)  Second stage labor - somewhat surprisingly fast progress given earlier protracted labor.  Pelvis still borderline, but given the rapid progress to this stage will allow attempt at pushing.    3)  GBS Pos - on PCN     4)  COVID-19 Neg          Plan:   1)  Pt to start pushing now.    2)  Will observe closely for progress.  Reevaluate after each hour of pushing.      Davis Prince MD

## 2020-08-06 PROCEDURE — 12000000 ZZH R&B MED SURG/OB

## 2020-08-06 PROCEDURE — 25000132 ZZH RX MED GY IP 250 OP 250 PS 637: Performed by: OBSTETRICS & GYNECOLOGY

## 2020-08-06 RX ADMIN — ACETAMINOPHEN 650 MG: 325 TABLET, FILM COATED ORAL at 23:08

## 2020-08-06 RX ADMIN — ACETAMINOPHEN 650 MG: 325 TABLET, FILM COATED ORAL at 12:48

## 2020-08-06 RX ADMIN — IBUPROFEN 600 MG: 600 TABLET ORAL at 00:03

## 2020-08-06 RX ADMIN — IBUPROFEN 600 MG: 600 TABLET ORAL at 23:08

## 2020-08-06 RX ADMIN — IBUPROFEN 600 MG: 600 TABLET ORAL at 08:02

## 2020-08-06 RX ADMIN — DOCUSATE SODIUM AND SENNOSIDES 1 TABLET: 8.6; 5 TABLET ORAL at 23:08

## 2020-08-06 RX ADMIN — DOCUSATE SODIUM AND SENNOSIDES 1 TABLET: 8.6; 5 TABLET ORAL at 08:02

## 2020-08-06 RX ADMIN — ACETAMINOPHEN 650 MG: 325 TABLET, FILM COATED ORAL at 04:44

## 2020-08-06 RX ADMIN — IBUPROFEN 600 MG: 600 TABLET ORAL at 17:35

## 2020-08-06 NOTE — PROGRESS NOTES
[facility] Obstetrics Post-Partum Progress Note          Assessment and Plan:    Assessment:   Post-partum day #1  Vacuum extraction for maternal exhaustion  L&D complications: None      Doing well.  No excessive bleeding  Pain well-controlled.      Plan:   Ambulation encouraged  Reportable signs and symptoms dicussed with the patient  Anticipate discharge tomorrow           Interval History:   Doing well.  Pain is well-controlled.  No fevers.  No history of foul-smelling vaginal discharge.  Good appetite.  Denies chest pain, shortness of breath, nausea or vomiting.  Vaginal bleeding is similar to a heavy menstrual flow.  Ambulatory          Significant Problems:      Past Medical History:   Diagnosis Date     Hypothyroidism      Primary female infertility      Vacuum extraction, delivered, current hospitalization 8/5/2020                   Physical Exam:     All vitals stable  Patient Vitals for the past 12 hrs:   BP Temp Temp src Pulse Resp   08/06/20 0003 95/63 98.1  F (36.7  C) Oral 68 16   08/05/20 2100 95/55 98  F (36.7  C) Oral 67 18     Uterine fundus is firm, non-tender and at the level of the umbilicus  Ext NT     Taco Singh MD  8/6/2020 7:40 AM

## 2020-08-06 NOTE — PLAN OF CARE
Patient is vitally stable. Ambulating and has called for assistance with jenni cares. Pain is managed with PRN Ibuprofen and Tylenol. Using ice for bottom pain and heat for cramping. Breastfeeding well with some assistance requested from staff. Parents are attentive to infant needs and bonding well with infant.

## 2020-08-06 NOTE — PLAN OF CARE
Patient vitally stable, voiding, tolerating regular diet, ambulating mostly independently, appreciates assistance in and out of bed due to perineal soreness. Postpartum checks WDL. Patient c/o perineal soreness, prn Tylenol and Ibuprofen given, writer encouraged use of ice/tucks with jenni-cares, sitting on a pillow while nursing and laying on her side when not sitting up. Breastfeeding with assistance from staff. Many questions regarding infant behavior, reassurance provided.  present and very supportive.

## 2020-08-06 NOTE — ANESTHESIA POSTPROCEDURE EVALUATION
Patient: Marisel Perry    * No procedures listed *    Diagnosis:* No pre-op diagnosis entered *  Diagnosis Additional Information: No value filed.    Anesthesia Type:  Epidural    Note:  Anesthesia Post Evaluation    Patient location during evaluation: Floor  Patient participation: Able to fully participate in evaluation  Level of consciousness: awake and alert  Pain management: adequate  Airway patency: patent  Cardiovascular status: acceptable  Respiratory status: acceptable  Hydration status: acceptable  PONV: none       Comments:   Labor Epidural Post delivery note.      Doing well. VSS Temp normal. Satisfactory respiratory and cardiovascular function. Return of neurologic function. Questions encouraged and answered. Denies positional headache. Minimal side effects easily managed w/ PRN meds. No apparent anesthetic complications. No follow-up required.    I or my partner was immediately available for management of this patient during epidural analgesia infusion.    Mason Vincent MD          Last vitals:  Vitals:    08/05/20 1800 08/05/20 2100 08/06/20 0003   BP: 128/71 95/55 95/63   Pulse:  67 68   Resp:  18 16   Temp:  98  F (36.7  C) 98.1  F (36.7  C)   SpO2:            Electronically Signed By: Mason Vincent MD  August 6, 2020  7:06 AM

## 2020-08-06 NOTE — LACTATION NOTE
This note was copied from a baby's chart.   visit x 2 today.  Infant was too sleepy to latch during initial visit. LC placed infant STS and encouraged parents to call when ready to try again.  Good latch obtained on both breasts for the feeding an hour later.  Frequent swallows present when nursing and volumes easily expressed.  Marisel is becoming more independent with latching infant.  Cradle hold was used on the right and cross cradle on the left.  All questions answered.  Parents will need further education about bottle introduction and milk storage prior to discharge.

## 2020-08-06 NOTE — PLAN OF CARE
Patient meting expected goals. Bonding well with . Using tylenol and ibuprofen for pain. Also using ice to jenni area. Voiding without difficulty. Tolerating regular diet.

## 2020-08-07 VITALS
DIASTOLIC BLOOD PRESSURE: 63 MMHG | HEART RATE: 73 BPM | SYSTOLIC BLOOD PRESSURE: 104 MMHG | TEMPERATURE: 99.4 F | RESPIRATION RATE: 16 BRPM | OXYGEN SATURATION: 100 %

## 2020-08-07 LAB — COPATH REPORT: NORMAL

## 2020-08-07 PROCEDURE — 25000132 ZZH RX MED GY IP 250 OP 250 PS 637: Performed by: OBSTETRICS & GYNECOLOGY

## 2020-08-07 RX ORDER — ACETAMINOPHEN 325 MG/1
650 TABLET ORAL EVERY 4 HOURS PRN
COMMUNITY
Start: 2020-08-07 | End: 2023-10-31

## 2020-08-07 RX ADMIN — DOCUSATE SODIUM AND SENNOSIDES 1 TABLET: 8.6; 5 TABLET ORAL at 08:14

## 2020-08-07 RX ADMIN — BISACODYL 10 MG: 10 SUPPOSITORY RECTAL at 09:17

## 2020-08-07 RX ADMIN — IBUPROFEN 600 MG: 600 TABLET ORAL at 08:14

## 2020-08-07 RX ADMIN — ACETAMINOPHEN 650 MG: 325 TABLET, FILM COATED ORAL at 08:14

## 2020-08-07 NOTE — PROGRESS NOTES
Patient's After Visit Summary was reviewed with patient and/or spouse.   Patient verbalized understanding of After Visit Summary, recommended follow up and was given an opportunity to ask questions.   Discharge medications sent home with patient/family: No   Discharged with spouse at 1350.

## 2020-08-07 NOTE — DISCHARGE SUMMARY
[facility] Obstetrics Post-Partum Progress Note          Assessment and Plan:    Assessment:   Post-partum day #2  Vacuum extraction for maternal exhaustion  L&D complications: None      Doing well.  Clean wound without signs of infection.  Normal healing wound.  No excessive bleeding  Pain well-controlled.      Plan:   Ambulation encouraged  Breast feeding strategies discussed  Monitor wound for signs of infection  Pain control measures as needed  Reportable signs and symptoms dicussed with the patient    Take your tempature twice daily for 3 days and call if > 100.4  Nothing in vagina for 6 wks  Continue taking prenatal MVI daily for 1 month    Discharge later today           Interval History:   Doing well.  Pain is well-controlled.  No fevers.  No history of foul-smelling vaginal discharge.  Good appetite.  Denies chest pain, shortness of breath, nausea or vomiting.  Vaginal bleeding is similar to a heavy menstrual flow.  Ambulatory.  Breastfeeding well.          Significant Problems:      Past Medical History:   Diagnosis Date     Hypothyroidism      Primary female infertility      Vacuum extraction, delivered, current hospitalization 8/5/2020             Review of Systems:    The patient denies any chest pain, shortness of breath, excessive pain, fever, chills, purulent drainage from the wound, nausea or vomiting.          Medications:     All medications related to the patient's surgery have been reviewed  Current Facility-Administered Medications   Medication     acetaminophen (TYLENOL) tablet 650 mg     bisacodyl (DULCOLAX) Suppository 10 mg     hydrocortisone 2.5 % cream     ibuprofen (ADVIL/MOTRIN) tablet 600 mg     lactated ringers BOLUS 1,000 mL     lanolin cream     misoprostol (CYTOTEC) tablet 800 mcg     naloxone (NARCAN) injection 0.1-0.4 mg     No MMR Needed - Assessment: Patient does not need MMR vaccine     NO Rho (D) immune globulin (RhoGam) needed - mother Rh POSITIVE     No Tdap Needed -  Assessment: Patient does not need Tdap vaccine     oxytocin (PITOCIN) 30 units in 500 mL 0.9% NaCl infusion     oxytocin (PITOCIN) 30 units in 500 mL 0.9% NaCl infusion     oxytocin (PITOCIN) injection 10 Units     senna-docusate (SENOKOT-S/PERICOLACE) 8.6-50 MG per tablet 1 tablet    Or     senna-docusate (SENOKOT-S/PERICOLACE) 8.6-50 MG per tablet 2 tablet     sodium phosphate (FLEET ENEMA) 1 enema     tranexamic acid 1 g in 100 mL 0.7% NaCl IV bag (premix)             Physical Exam:   Vitals were reviewed  All vitals stable  Temp: 97.9  F (36.6  C) Temp src: Oral BP: 97/58 Pulse: 73 Heart Rate: 71 Resp: 16        Uterine fundus is firm, non-tender and at the level of the umbilicus          Data:     All laboratory data related to this surgery reviewed  Hemoglobin   Date Value Ref Range Status   08/04/2020 12.7 11.7 - 15.7 g/dL Final   05/29/2020 11.4 (L) 11.7 - 15.7 g/dL Final   03/10/2020 10.6 (L) 11.7 - 15.7 g/dL Final     Comment:     Results confirmed by repeat test   12/19/2019 13.1 11.7 - 15.7 g/dL Final   08/20/2019 11.8 11.7 - 15.7 g/dL Final     No imaging studies have been ordered    Guero Ibarra MD

## 2020-08-07 NOTE — PLAN OF CARE
Vss. Pt ambulating independently. Showered this evening. Pain managed with prn ibuprofen, tucks, and ice. FOB at bedside, supportive. Bonding well with baby.

## 2020-08-07 NOTE — PROGRESS NOTES
[facility] Obstetrics Post-Partum Progress Note          Assessment and Plan:    Assessment:   Post-partum day #2  Vacuum extraction for maternal exhaustion  L&D complications: None      Doing well.  Clean wound without signs of infection.  Normal healing wound.  No excessive bleeding  Pain well-controlled.      Plan:   Ambulation encouraged  Breast feeding strategies discussed  Monitor wound for signs of infection  Pain control measures as needed  Reportable signs and symptoms dicussed with the patient    Take your tempature twice daily for 3 days and call if > 100.4  Nothing in vagina for 6 wks  Continue taking prenatal MVI daily for 1 month    Discharge later today           Interval History:   Doing well.  Pain is well-controlled.  No fevers.  No history of foul-smelling vaginal discharge.  Good appetite.  Denies chest pain, shortness of breath, nausea or vomiting.  Vaginal bleeding is similar to a heavy menstrual flow.  Ambulatory.  Breastfeeding well.          Significant Problems:      Past Medical History:   Diagnosis Date     Hypothyroidism      Primary female infertility      Vacuum extraction, delivered, current hospitalization 8/5/2020             Review of Systems:    The patient denies any chest pain, shortness of breath, excessive pain, fever, chills, purulent drainage from the wound, nausea or vomiting.          Medications:     All medications related to the patient's surgery have been reviewed  Current Facility-Administered Medications   Medication     acetaminophen (TYLENOL) tablet 650 mg     bisacodyl (DULCOLAX) Suppository 10 mg     hydrocortisone 2.5 % cream     ibuprofen (ADVIL/MOTRIN) tablet 600 mg     lactated ringers BOLUS 1,000 mL     lanolin cream     misoprostol (CYTOTEC) tablet 800 mcg     naloxone (NARCAN) injection 0.1-0.4 mg     No MMR Needed - Assessment: Patient does not need MMR vaccine     NO Rho (D) immune globulin (RhoGam) needed - mother Rh POSITIVE     No Tdap Needed -  Assessment: Patient does not need Tdap vaccine     oxytocin (PITOCIN) 30 units in 500 mL 0.9% NaCl infusion     oxytocin (PITOCIN) 30 units in 500 mL 0.9% NaCl infusion     oxytocin (PITOCIN) injection 10 Units     senna-docusate (SENOKOT-S/PERICOLACE) 8.6-50 MG per tablet 1 tablet    Or     senna-docusate (SENOKOT-S/PERICOLACE) 8.6-50 MG per tablet 2 tablet     sodium phosphate (FLEET ENEMA) 1 enema     tranexamic acid 1 g in 100 mL 0.7% NaCl IV bag (premix)             Physical Exam:   Vitals were reviewed  All vitals stable  Temp: 97.9  F (36.6  C) Temp src: Oral BP: 97/58 Pulse: 73 Heart Rate: 71 Resp: 16        Uterine fundus is firm, non-tender and at the level of the umbilicus          Data:     All laboratory data related to this surgery reviewed  Hemoglobin   Date Value Ref Range Status   08/04/2020 12.7 11.7 - 15.7 g/dL Final   05/29/2020 11.4 (L) 11.7 - 15.7 g/dL Final   03/10/2020 10.6 (L) 11.7 - 15.7 g/dL Final     Comment:     Results confirmed by repeat test   12/19/2019 13.1 11.7 - 15.7 g/dL Final   08/20/2019 11.8 11.7 - 15.7 g/dL Final     No imaging studies have been ordered    Guero Ibarra MD

## 2020-08-07 NOTE — PLAN OF CARE
Patient is vitally stable. Ambulating and independent with cares. Pain is well managed with PRN Ibuprofen and Tylenol. Breastfeeding well and often with minimal assistance requested from staff. Parents are attentive to infant needs and bonding well with infant.

## 2020-10-01 ENCOUNTER — MEDICAL CORRESPONDENCE (OUTPATIENT)
Dept: HEALTH INFORMATION MANAGEMENT | Facility: CLINIC | Age: 30
End: 2020-10-01

## 2020-12-08 ENCOUNTER — MEDICAL CORRESPONDENCE (OUTPATIENT)
Dept: HEALTH INFORMATION MANAGEMENT | Facility: CLINIC | Age: 30
End: 2020-12-08

## 2021-01-03 ENCOUNTER — HEALTH MAINTENANCE LETTER (OUTPATIENT)
Age: 31
End: 2021-01-03

## 2021-02-22 ENCOUNTER — MEDICAL CORRESPONDENCE (OUTPATIENT)
Dept: HEALTH INFORMATION MANAGEMENT | Facility: CLINIC | Age: 31
End: 2021-02-22

## 2021-04-16 ENCOUNTER — IMMUNIZATION (OUTPATIENT)
Dept: NURSING | Facility: CLINIC | Age: 31
End: 2021-04-16
Payer: COMMERCIAL

## 2021-04-16 PROCEDURE — 91300 PR COVID VAC PFIZER DIL RECON 30 MCG/0.3 ML IM: CPT

## 2021-04-16 PROCEDURE — 0001A PR COVID VAC PFIZER DIL RECON 30 MCG/0.3 ML IM: CPT

## 2021-04-25 ENCOUNTER — HEALTH MAINTENANCE LETTER (OUTPATIENT)
Age: 31
End: 2021-04-25

## 2021-05-07 ENCOUNTER — IMMUNIZATION (OUTPATIENT)
Dept: NURSING | Facility: CLINIC | Age: 31
End: 2021-05-07
Attending: INTERNAL MEDICINE
Payer: COMMERCIAL

## 2021-05-07 PROCEDURE — 0002A PR COVID VAC PFIZER DIL RECON 30 MCG/0.3 ML IM: CPT

## 2021-05-07 PROCEDURE — 91300 PR COVID VAC PFIZER DIL RECON 30 MCG/0.3 ML IM: CPT

## 2021-10-06 ENCOUNTER — APPOINTMENT (OUTPATIENT)
Dept: URBAN - METROPOLITAN AREA CLINIC 260 | Age: 31
Setting detail: DERMATOLOGY
End: 2021-10-07

## 2021-10-06 VITALS — HEIGHT: 61 IN | RESPIRATION RATE: 17 BRPM | WEIGHT: 115 LBS

## 2021-10-06 DIAGNOSIS — L81.1 CHLOASMA: ICD-10-CM

## 2021-10-06 DIAGNOSIS — L71.0 PERIORAL DERMATITIS: ICD-10-CM

## 2021-10-06 PROCEDURE — OTHER PRESCRIPTION: OTHER

## 2021-10-06 PROCEDURE — 99203 OFFICE O/P NEW LOW 30 MIN: CPT

## 2021-10-06 PROCEDURE — OTHER COUNSELING: OTHER

## 2021-10-06 PROCEDURE — OTHER PRESCRIPTION MEDICATION MANAGEMENT: OTHER

## 2021-10-06 RX ORDER — METRONIDAZOLE 7.5 MG/G
CREAM TOPICAL BID
Qty: 45 | Refills: 1 | Status: ERX | COMMUNITY
Start: 2021-10-06

## 2021-10-06 RX ORDER — MINOCYCLINE HYDROCHLORIDE 100 MG/1
CAPSULE ORAL QD
Qty: 30 | Refills: 0 | Status: ERX | COMMUNITY
Start: 2021-10-06

## 2021-10-06 ASSESSMENT — LOCATION DETAILED DESCRIPTION DERM
LOCATION DETAILED: RIGHT LOWER CUTANEOUS LIP
LOCATION DETAILED: RIGHT UPPER CUTANEOUS LIP

## 2021-10-06 ASSESSMENT — LOCATION ZONE DERM: LOCATION ZONE: LIP

## 2021-10-06 ASSESSMENT — LOCATION SIMPLE DESCRIPTION DERM: LOCATION SIMPLE: RIGHT LIP

## 2021-10-06 NOTE — PROCEDURE: PRESCRIPTION MEDICATION MANAGEMENT
Detail Level: Zone
Plan: Follow up in 3 weeks
Initiate Treatment: Minocycline 100mg BID, Metronidazole 0.75% cream BID
Render In Strict Bullet Format?: No
Discontinue Regimen: Hydroquinone

## 2021-10-06 NOTE — PROCEDURE: COUNSELING
Patient Specific Counseling (Will Not Stick From Patient To Patient): Likely a rash from her Hydroquinone , tretinoin mometasone cream from [facility].
Patient Specific Counseling (Will Not Stick From Patient To Patient): Will treat this after the rash goes away
Detail Level: Zone

## 2021-10-06 NOTE — HPI: RASH
How Severe Is Your Rash?: moderate
Is This A New Presentation, Or A Follow-Up?: Rash
Additional History: Has had darkening for years and used Hydroquinone 2, tret 0.025%, mometasone 0.1% cream combined \\nUsed it twice a week and then stopped when she was pregnant

## 2021-10-10 ENCOUNTER — HEALTH MAINTENANCE LETTER (OUTPATIENT)
Age: 31
End: 2021-10-10

## 2021-10-27 NOTE — TELEPHONE ENCOUNTER
I got the patients voicemail. I left a message with my call back number and requested  she call me back to complete PAT phone assessment and confirm when Covid test will be done. I left pre-op instructions. Addendum: Patient returned call and phone PAT assessment complete. Surgery 11/2/21   you will be called   11/1/21   with times               1. Do not eat or drink anything after midnight - unless instructed by your doctor prior to surgery. This includes                   no water, chewing gum or mints. 2. Follow your directions as prescribed by the doctor for your procedure and medications. 3. Check with your Doctor regarding stopping vitamins, supplements, blood thinners (Plavix, Coumadin, Lovenox, Effient, Pradaxa, Xarelto, Fragmin or                   other blood thinners) and follow their instructions. Stop all supplements and herbals 7 days before surgery. Morning of surgery, do not take any medications. 4. Do not smoke, and do not drink any alcoholic beverages 24 hours prior to surgery. This includes NA Beer. 5. You may brush your teeth and gargle the morning of surgery. DO NOT SWALLOW WATER   6. You MUST make arrangements for a responsible adult to take you home after your surgery and be able to check on you every couple                   hours for the day. You will not be allowed to leave alone or drive yourself home. It is strongly suggested someone stay with you the first 24                   hrs. Your surgery will be cancelled if you do not have a ride home. 7. Please wear simple, loose fitting clothing to the hospital.  Yordy Greenfield not bring valuables (money, credit cards, checkbooks, etc.) Do not wear any                   makeup (including no eye makeup) or nail polish on your fingers or toes. 8. DO NOT wear any jewelry or piercings on day of surgery. All body piercing jewelry must be removed.              9. If you have dentures, they will be removed before going to the [facility] with pt. Has lab only appt scheduled for 4:10 today.  Pinky Nolan RN     OR; we will provide you a container. If you wear contact lenses or glasses,                  they will be removed; please bring a case for them. 10. If you  have a Living Will and Durable Power of  for Healthcare, please bring in a copy. 11. Please bring picture ID,  insurance card, paperwork from the doctors office    (H & P, Consent, & card for implantable devices). 12. Take a shower the night before or morning of your procedure, do not apply any lotion, oil or powder. 13. Wear a mask covering your nose & mouth when entering the hospital. Have your covid-19 test performed within 2-7 days of your                  surgery. Quarantine yourself after the test until after your surgery.   Covid Test 10/28/21

## 2022-05-21 ENCOUNTER — HEALTH MAINTENANCE LETTER (OUTPATIENT)
Age: 32
End: 2022-05-21

## 2022-09-18 ENCOUNTER — HEALTH MAINTENANCE LETTER (OUTPATIENT)
Age: 32
End: 2022-09-18

## 2023-06-04 ENCOUNTER — HEALTH MAINTENANCE LETTER (OUTPATIENT)
Age: 33
End: 2023-06-04

## 2023-10-31 ENCOUNTER — OFFICE VISIT (OUTPATIENT)
Dept: MIDWIFE SERVICES | Facility: CLINIC | Age: 33
End: 2023-10-31
Payer: COMMERCIAL

## 2023-10-31 VITALS — BODY MASS INDEX: 22.92 KG/M2 | SYSTOLIC BLOOD PRESSURE: 88 MMHG | DIASTOLIC BLOOD PRESSURE: 52 MMHG | WEIGHT: 113.5 LBS

## 2023-10-31 DIAGNOSIS — Z13.29 SCREENING FOR ENDOCRINE, METABOLIC, AND IMMUNITY DISORDER: ICD-10-CM

## 2023-10-31 DIAGNOSIS — Z31.69 ENCOUNTER FOR PRECONCEPTION CONSULTATION: ICD-10-CM

## 2023-10-31 DIAGNOSIS — R53.83 OTHER FATIGUE: ICD-10-CM

## 2023-10-31 DIAGNOSIS — L65.9 HAIR LOSS: ICD-10-CM

## 2023-10-31 DIAGNOSIS — Z13.0 SCREENING FOR ENDOCRINE, METABOLIC, AND IMMUNITY DISORDER: ICD-10-CM

## 2023-10-31 DIAGNOSIS — Z13.228 SCREENING FOR ENDOCRINE, METABOLIC, AND IMMUNITY DISORDER: ICD-10-CM

## 2023-10-31 DIAGNOSIS — Z01.411 ENCOUNTER FOR GYNECOLOGICAL EXAMINATION WITH ABNORMAL FINDING: Primary | ICD-10-CM

## 2023-10-31 LAB
ALBUMIN SERPL BCG-MCNC: 4.3 G/DL (ref 3.5–5.2)
ALP SERPL-CCNC: 54 U/L (ref 35–104)
ALT SERPL W P-5'-P-CCNC: 13 U/L (ref 0–50)
ANION GAP SERPL CALCULATED.3IONS-SCNC: 9 MMOL/L (ref 7–15)
AST SERPL W P-5'-P-CCNC: 23 U/L (ref 0–45)
BILIRUB SERPL-MCNC: 0.3 MG/DL
BUN SERPL-MCNC: 10.9 MG/DL (ref 6–20)
CALCIUM SERPL-MCNC: 9.3 MG/DL (ref 8.6–10)
CHLORIDE SERPL-SCNC: 106 MMOL/L (ref 98–107)
CHOLEST SERPL-MCNC: 149 MG/DL
CREAT SERPL-MCNC: 0.73 MG/DL (ref 0.51–0.95)
DEPRECATED HCO3 PLAS-SCNC: 26 MMOL/L (ref 22–29)
EGFRCR SERPLBLD CKD-EPI 2021: >90 ML/MIN/1.73M2
ERYTHROCYTE [DISTWIDTH] IN BLOOD BY AUTOMATED COUNT: 12.4 % (ref 10–15)
GLUCOSE SERPL-MCNC: 84 MG/DL (ref 70–99)
HCT VFR BLD AUTO: 37.1 % (ref 35–47)
HDLC SERPL-MCNC: 38 MG/DL
HGB BLD-MCNC: 12.7 G/DL (ref 11.7–15.7)
LDLC SERPL CALC-MCNC: 96 MG/DL
MCH RBC QN AUTO: 29 PG (ref 26.5–33)
MCHC RBC AUTO-ENTMCNC: 34.2 G/DL (ref 31.5–36.5)
MCV RBC AUTO: 85 FL (ref 78–100)
NONHDLC SERPL-MCNC: 111 MG/DL
PLATELET # BLD AUTO: 238 10E3/UL (ref 150–450)
POTASSIUM SERPL-SCNC: 3.9 MMOL/L (ref 3.4–5.3)
PROT SERPL-MCNC: 7.6 G/DL (ref 6.4–8.3)
RBC # BLD AUTO: 4.38 10E6/UL (ref 3.8–5.2)
SODIUM SERPL-SCNC: 141 MMOL/L (ref 135–145)
TRIGL SERPL-MCNC: 75 MG/DL
TSH SERPL DL<=0.005 MIU/L-ACNC: 3.61 UIU/ML (ref 0.3–4.2)
VIT D+METAB SERPL-MCNC: 22 NG/ML (ref 20–50)
WBC # BLD AUTO: 5.3 10E3/UL (ref 4–11)

## 2023-10-31 PROCEDURE — 36415 COLL VENOUS BLD VENIPUNCTURE: CPT | Performed by: ADVANCED PRACTICE MIDWIFE

## 2023-10-31 PROCEDURE — 84443 ASSAY THYROID STIM HORMONE: CPT | Performed by: ADVANCED PRACTICE MIDWIFE

## 2023-10-31 PROCEDURE — 82306 VITAMIN D 25 HYDROXY: CPT | Performed by: ADVANCED PRACTICE MIDWIFE

## 2023-10-31 PROCEDURE — 99395 PREV VISIT EST AGE 18-39: CPT | Performed by: ADVANCED PRACTICE MIDWIFE

## 2023-10-31 PROCEDURE — 85027 COMPLETE CBC AUTOMATED: CPT | Performed by: ADVANCED PRACTICE MIDWIFE

## 2023-10-31 PROCEDURE — 80053 COMPREHEN METABOLIC PANEL: CPT | Performed by: ADVANCED PRACTICE MIDWIFE

## 2023-10-31 PROCEDURE — 99213 OFFICE O/P EST LOW 20 MIN: CPT | Mod: 25 | Performed by: ADVANCED PRACTICE MIDWIFE

## 2023-10-31 PROCEDURE — 80061 LIPID PANEL: CPT | Performed by: ADVANCED PRACTICE MIDWIFE

## 2023-10-31 RX ORDER — PRENATAL VIT/IRON FUM/FOLIC AC 27MG-0.8MG
1 TABLET ORAL DAILY
Qty: 90 TABLET | Refills: 4 | Status: SHIPPED | OUTPATIENT
Start: 2023-10-31 | End: 2024-04-23

## 2023-10-31 NOTE — PATIENT INSTRUCTIONS
Preventive Health Recommendations  Female Ages 21 - 39  Yearly exam:   See your health care provider every year in order to  1. Review health changes.  2. Discuss preventive care.    3. Review your medicines if you your provider has prescribed any.    You do not need a Pap test if your uterus was removed (hysterectomy) and you have not had cancer.  You should be tested each year for STIs (sexually transmitted diseases) if you're at risk.   Talk to your provider about how often to have your cholesterol checked, about every 5 years if normal.  If you are at risk for diabetes, you should have a diabetes test (fasting glucose).  Vitamin D deficiency screening and thyroid disease screening is also recommended every 3-5 years depending on risk factors or more often if symptomatic  PAP Smear:   Until age 30: Get a Pap test every three years (more often if you have had an abnormal result)    After age 30: Talk to your provider about whether you should have a Pap test every 3 years or have a Pap test with HPV screening every 5 years.   Shots: Get a flu shot each year. Get a tetanus shot every 10 years.   Nutrition:   Eat at least 5 servings of fruits and vegetables each day  Eat REAL food, stay away from processed foods.  Eat whole-grain bread, whole-wheat pasta and brown rice instead of white grains and rice.  For bone health:  Eat calcium-rich foods or take calcium pills (500 to 600 mg) twice a day with food (1200 mg per day). Also take vitamin D (2000 IUs) each day.     Lifestyle  Exercise regularly (30 minutes a day, 5 days of the week). This will help you control your weight and prevent disease.  Weight bearing exercise such as weight lifting, walking, running and yoga will be beneficial later in life preventing osteoporosis   Limit alcohol to one drink per day.  No smoking.   Wear sunscreen to prevent skin cancer.  See your dentist every six months to one year for an exam and cleaning depending on their  recommendation  Get an eye exam every two years or more often if you wear glasses or contacts.   Preconception Care    If you are thinking about becoming pregnant in the near future or actively trying to conceive we want to make sure you are as healthy as possible before becoming pregnant. By meeting with your midwife or provider prior to getting pregnant it allows us to address any health needs that can affect pregnancy. Please discuss your personal and family history with your midwife in order for us to identify any risk factors    If you wish to attempt pregnancy stop whichever form of contraception you use. If you have an IUD it can be removed in the office and you can start trying right away. If you take OCPs finish current pack, cycle, and then you can actively start trying    Make sure all the medications you are taking are safe for pregnancy. This is especially important for women with chronic health conditions such as diabetes, seizure disorders, and/or hypertension. If you are unsure if your medications are safe we can discuss them with you, do not abruptly stop taking something if you've been prescribed a medication by a medical provider    Folic acid 400-800 mcg per day by mouth daily, begin this routine 1 to 12 months prior to planned conception, and continue through at least 13 weeks gestation. Some prenatal/multi-vitamins contain enough folic acid     Be up to date on all your vaccines. Avoid pregnancy for 1 month after administration of varicella/ Rubella (MMR) vaccines    We encourage all women to be at healthy BMI (<26) when attempting pregnancy, it is healthier for both you and your baby. If you are overweight you can make a healthy choice by eating better and exercising more. Waiting to get pregnant at a healthy weight is an excellent choice.                                     Eat a healthy, well-balanced diet containing lots of fresh fruit and vegetables (frozen without added sugar is okay),  protein, and healthy carbohydrates such as whole wheat breads and pastas    Exercise regularly. If you are wishing to get pregnant maintain normal exercise routine. If you don't exercise this is a great time for light activity daily such as walking/swimming    Limit caffeine intake to less than 200 mg per day, typically 1-2 cups of regular coffee is about 200 mg.     Avoid cigarettes, alcohol, and recreational drug use while attempting pregnancy. If you are actively trying to conceive but you get your period or a negative pregnancy test it is okay to have alcohol in moderation until you are actively trying again    If you have the potential to toxic chemical exposures in your work place or home please use special precautions    Menstrual Cycles    Knowing your cycle is incredibly important when attempting pregnancy    Your cycle begins day 1 of your period and goes until the 1st day of your next period. Typically women have 28-32 day cycles. If your cycles are shorter or longer it can be a normal variation.     Women typically ovulate in the middle of their cycle    There are many great apps that can help you track you cycle monthly to establish when you are ovulating    You may also start taking your temperature daily with a basal body temp thermometer to help identify when you ovulate    There are also ovulation predictor kits available over the counter at the pharmacy    If you want more information please contact your midwife      It can take up to 1 year for a woman under the age of 35 or 6 months for a woman over the age of 35 to conceive. If it takes longer than this we would like to evaluate you for fertility issues.

## 2023-10-31 NOTE — PROGRESS NOTES
"Marisel is a 33 year old  female who presents for annual exam.     Besides routine health maintenance, she would like to get an rx for prenatal vitamins. She is hoping to try to become pregnant in January. Also reports hair loss and fatigue.   She states she had a normal pap in [place] last month.   HPI:    Menses are regular q 35 days and normal lasting 5 days.   Menses flow: normal.    Patient's last menstrual period was 10/25/2023 (exact date)..   Using none for contraception.    She is currently considering pregnancy.    REPRODUCTIVE/GYNECOLOGIC HISTORY:  Marisel is sexually active with male partner(s) and is currently in monogamous relationship.   STI testing offered?  Declined  History of abnormal Pap smear:  No  SOCIAL HISTORY  Abuse: does not report having previously been physical or sexually abused.    Do you feel safe in your environment? YES       She  reports that she has never smoked. She has never used smokeless tobacco.      Last PHQ-9 score on record =       2019    10:22 AM   PHQ-9 SCORE   PHQ-9 Total Score MyChart 5 (Mild depression)   PHQ-9 Total Score 5     Last GAD7 score on record =        No data to display              Alcohol Score = 0    HEALTH MAINTENANCE:  Cholesterol: (No results found for: \"CHOL\" History of abnormal lipids: No  Mammo: NA . History of abnormal Mammo: Not applicable.  Regular Self Breast Exams: Yes  TSH: (  TSH   Date Value Ref Range Status   03/10/2020 3.29 0.40 - 4.00 mU/L Final    )  Pap; (No results found for: \"PAP\" )  Immunizations up to date: yes  (Gardasil, Tdap, Flu)  Health maintenance updated:  yes    HEALTHY HABITS  Eating habits: eats regular meals  Calcium/Vitamin D intake: source:  none Adequate? On multivitamin    Exercise: How often do you exercise? Active with toddler   Have you had an eye exam in the last two years? NO  Do you routinely see the dentist once or twice yearly? YES         HISTORY:  OB History    Para Term  AB Living   2 1 1 " 0 1 1   SAB IAB Ectopic Multiple Live Births   0 0 0 0 1      # Outcome Date GA Lbr Jimy/2nd Weight Sex Delivery Anes PTL Lv   2 Term 08/05/20 38w0d 12:50 / 03:14 3.18 kg (7 lb 0.2 oz) F Vag-Vacuum EPI N LUZ      Complications: GBS, Prolonged PROM (>18 hours), Dysfunctional Labor      Name: MESSI,FEMALE-GIANNA      Apgar1: 7  Apgar5: 9   1 AB 08/21/19 8w4d            Past Medical History:   Diagnosis Date    Hypothyroidism     Primary female infertility     Vacuum extraction, delivered, current hospitalization 8/5/2020     Past Surgical History:   Procedure Laterality Date    ESOPHAGOSCOPY, GASTROSCOPY, DUODENOSCOPY (EGD), COMBINED N/A 11/20/2019    Procedure: ESOPHAGOGASTRODUODENOSCOPY, WITH BIOPSY;  Surgeon: Evan Aaron MD;  Location:  GI     Family History   Problem Relation Age of Onset    Diabetes Type 2  Father      Social History     Socioeconomic History    Marital status:      Spouse name: Butch Ricardo    Number of children: None    Years of education: None    Highest education level: Master's degree (e.g., MA, MS, Alix, MEd, MSW, KAYE)   Occupational History    Occupation: IT   Tobacco Use    Smoking status: Never    Smokeless tobacco: Never   Substance and Sexual Activity    Alcohol use: No    Drug use: No    Sexual activity: Yes     Partners: Male     Social Determinants of Health     Financial Resource Strain: Low Risk  (8/20/2019)    Overall Financial Resource Strain (CARDIA)     Difficulty of Paying Living Expenses: Not hard at all   Food Insecurity: No Food Insecurity (8/20/2019)    Hunger Vital Sign     Worried About Running Out of Food in the Last Year: Never true     Ran Out of Food in the Last Year: Never true   Transportation Needs: No Transportation Needs (8/20/2019)    PRAPARE - Transportation     Lack of Transportation (Medical): No     Lack of Transportation (Non-Medical): No       Current Outpatient Medications:     Prenatal Vit-Fe Fumarate-FA (PRENATAL MULTIVITAMIN W/IRON)  27-0.8 MG tablet, Take 1 tablet by mouth daily, Disp: 90 tablet, Rfl: 4   No Known Allergies    Past medical, surgical, social and family history were reviewed and updated in EPIC.    ROS:   CONSTITUTIONAL: NEGATIVE for fever, chills, change in weight, positive for fatigue  INTEGUMENTARU/SKIN: NEGATIVE for worrisome rashes, moles or lesions, positive for hair loss  EYES: NEGATIVE for vision changes or irritation  ENT: NEGATIVE for ear, mouth and throat problems  RESP: NEGATIVE for significant cough or SOB  BREAST: NEGATIVE for masses, tenderness or discharge  CV: NEGATIVE for chest pain, palpitations or peripheral edema  GI: NEGATIVE for nausea, abdominal pain, heartburn, or change in bowel habits  : NEGATIVE for unusual urinary or vaginal symptoms. Periods are regular.  MUSCULOSKELETAL: NEGATIVE for significant arthralgias or myalgia  NEURO: NEGATIVE for weakness, dizziness or paresthesias  PSYCHIATRIC: NEGATIVE for changes in mood or affect    PHYSICAL EXAM:  BP (!) 88/52 (BP Location: Left arm, Cuff Size: Adult Regular)   Wt 51.5 kg (113 lb 8 oz)   LMP 10/25/2023 (Exact Date)   Breastfeeding No   BMI 22.92 kg/m     BMI: Body mass index is 22.92 kg/m .  Constitutional: healthy, alert and no distress  Neck: symmetrical, thyroid normal size, no masses present, no lymphadenopathy present.   Cardiovascular: RRR, no murmurs present  Respiratory: breathing unlabored, lungs CTA bilaterally  Breast:normal without masses, tenderness or nipple discharge and no palpable axillary masses or adenopathy  Gastrointestinal: abdomen soft, non-tender, bowel sounds present  PELVIC EXAM: deferred    ASSESSMENT/PLAN:    ICD-10-CM    1. Encounter for gynecological examination with abnormal finding  Z01.411       2. Screening for endocrine, metabolic, and immunity disorder  Z13.29 Lipid panel reflex to direct LDL Fasting    Z13.228 TSH with free T4 reflex    Z13.0 CBC with platelets     Comprehensive metabolic panel (BMP + Alb, Alk  Phos, ALT, AST, Total. Bili, TP)     Lipid panel reflex to direct LDL Fasting     TSH with free T4 reflex     CBC with platelets     Comprehensive metabolic panel (BMP + Alb, Alk Phos, ALT, AST, Total. Bili, TP)      3. Encounter for preconception consultation  Z31.69 Prenatal Vit-Fe Fumarate-FA (PRENATAL MULTIVITAMIN W/IRON) 27-0.8 MG tablet      4. Other fatigue  R53.83 Vitamin D Deficiency     Vitamin D Deficiency      5. Hair loss  L65.9         No results found for any visits on 10/31/23.      COUNSELING:   Reviewed preventive health counseling, as reflected in patient instructions   reports that she has never smoked. She has never used smokeless tobacco.      GOLDY Cortez, CNM

## 2023-10-31 NOTE — NURSING NOTE
"Chief Complaint   Patient presents with    Gyn Exam     Pelvic & Breast exam   Pap Due  3/10/2018  NIL          Initial BP (!) 88/52 (BP Location: Left arm, Cuff Size: Adult Regular)   Wt 51.5 kg (113 lb 8 oz)   LMP 10/25/2023 (Exact Date)   Breastfeeding No   BMI 22.92 kg/m   Estimated body mass index is 22.92 kg/m  as calculated from the following:    Height as of 20: 1.499 m (4' 11\").    Weight as of this encounter: 51.5 kg (113 lb 8 oz).  BP completed using cuff size: regular    Questioned patient about current smoking habits.  Pt. has never smoked.          The following HM Due: NONE      Cait Neff, LEATHA on 10/31/2023 at 9:12 AM    "

## 2024-03-19 ENCOUNTER — DOCUMENTATION ONLY (OUTPATIENT)
Dept: OBGYN | Facility: CLINIC | Age: 34
End: 2024-03-19

## 2024-03-19 ENCOUNTER — LAB (OUTPATIENT)
Dept: LAB | Facility: CLINIC | Age: 34
End: 2024-03-19
Payer: COMMERCIAL

## 2024-03-19 DIAGNOSIS — O09.299 HISTORY OF MISCARRIAGE, CURRENTLY PREGNANT: ICD-10-CM

## 2024-03-19 DIAGNOSIS — Z32.01 PREGNANCY TEST POSITIVE: ICD-10-CM

## 2024-03-19 LAB — HCG INTACT+B SERPL-ACNC: 38 MIU/ML

## 2024-03-19 PROCEDURE — 84702 CHORIONIC GONADOTROPIN TEST: CPT

## 2024-03-19 PROCEDURE — 36415 COLL VENOUS BLD VENIPUNCTURE: CPT

## 2024-03-19 NOTE — PROGRESS NOTES
This patient is coming in the next few days for HCGs. Please place appropriate orders, thank you. Lab staff [facility]

## 2024-03-20 ENCOUNTER — TELEPHONE (OUTPATIENT)
Dept: OBGYN | Facility: CLINIC | Age: 34
End: 2024-03-20
Payer: COMMERCIAL

## 2024-03-20 NOTE — TELEPHONE ENCOUNTER
[facility] Call Center    Phone Message    May a detailed message be left on voicemail: yes     Reason for Call: Other: Pt is requesting to go over recent test results, writer reached out Via secure chat but was unable to reach anyone. Please call Pt back at # [phone].      Action Taken: Other: [facility]    Travel Screening: Not Applicable

## 2024-03-21 ENCOUNTER — LAB (OUTPATIENT)
Dept: LAB | Facility: CLINIC | Age: 34
End: 2024-03-21
Payer: COMMERCIAL

## 2024-03-21 DIAGNOSIS — O09.299 HISTORY OF MISCARRIAGE, CURRENTLY PREGNANT: ICD-10-CM

## 2024-03-21 DIAGNOSIS — Z32.01 PREGNANCY TEST POSITIVE: ICD-10-CM

## 2024-03-21 LAB — HCG INTACT+B SERPL-ACNC: 178 MIU/ML

## 2024-03-21 PROCEDURE — 36415 COLL VENOUS BLD VENIPUNCTURE: CPT

## 2024-03-21 PROCEDURE — 84702 CHORIONIC GONADOTROPIN TEST: CPT

## 2024-04-08 ENCOUNTER — VIRTUAL VISIT (OUTPATIENT)
Dept: OBGYN | Facility: CLINIC | Age: 34
End: 2024-04-08
Payer: COMMERCIAL

## 2024-04-08 DIAGNOSIS — O09.299 HISTORY OF MISCARRIAGE, CURRENTLY PREGNANT: Primary | ICD-10-CM

## 2024-04-08 DIAGNOSIS — Z34.80 PRENATAL CARE, SUBSEQUENT PREGNANCY, UNSPECIFIED TRIMESTER: ICD-10-CM

## 2024-04-08 LAB
ABO/RH(D): NORMAL
ANTIBODY SCREEN: NEGATIVE
SPECIMEN EXPIRATION DATE: NORMAL

## 2024-04-08 PROCEDURE — 99207 PR NO CHARGE NURSE ONLY: CPT | Mod: 93

## 2024-04-08 NOTE — PROGRESS NOTES
NPN nurse visit done over the phone. Pt will be given NPN folder and book at her upcoming appt.   Discussed optional screening available to assess chromosomal anomalies. Questions answered. Pt advised to call the clinic if she has any questions or concerns related to her pregnancy. Prenatal labs will be obtained at her upcoming appt. New prenatal visit scheduled on  with Dr Fisher.    . 7w0d    Menstrual cycles: regular    Last pap: 2018 ?        Patient supplied answers from flow sheet for:  Prenatal OB Questionnaire.  Past Medical History  Have you ever recieved care for your mental health? : No  Have you ever been in a major accident or suffered serious trauma?: No  Within the last year, has anyone hit, slapped, kicked or otherwise hurt you?: No  In the last year, has anyone forced you to have sex when you didn't want to?: No    Past Medical History 2   Have you ever received a blood transfusion?: (!) Yes  Would you accept a blood transfusion if was medically recommended?: Yes  Does anyone in your home smoke?: No   Is your blood type Rh negative?: No  Have you ever ?: (!) Yes  Have you been hospitalized for a nonsurgical reason excluding normal delivery?: No  Have you ever had an abnormal pap smear?: No    Past Medical History (Continued)  Do you have a history of abnormalities of the uterus?: No  Did your mother take WILLY or any other hormones when she was pregnant with you?: No  Do you have any other problems we have not asked about which you feel may be important to this pregnancy?: No    CAMERON Wilson

## 2024-04-09 ENCOUNTER — VIRTUAL VISIT (OUTPATIENT)
Dept: OBGYN | Facility: CLINIC | Age: 34
End: 2024-04-09
Payer: COMMERCIAL

## 2024-04-09 ENCOUNTER — LAB (OUTPATIENT)
Dept: LAB | Facility: CLINIC | Age: 34
End: 2024-04-09
Payer: COMMERCIAL

## 2024-04-09 ENCOUNTER — ANCILLARY PROCEDURE (OUTPATIENT)
Dept: ULTRASOUND IMAGING | Facility: CLINIC | Age: 34
End: 2024-04-09
Payer: COMMERCIAL

## 2024-04-09 DIAGNOSIS — Z34.80 PRENATAL CARE, SUBSEQUENT PREGNANCY, UNSPECIFIED TRIMESTER: ICD-10-CM

## 2024-04-09 DIAGNOSIS — O26.91 ABNORMAL PREGNANCY IN FIRST TRIMESTER: Primary | ICD-10-CM

## 2024-04-09 DIAGNOSIS — O09.299 HISTORY OF MISCARRIAGE, CURRENTLY PREGNANT: ICD-10-CM

## 2024-04-09 DIAGNOSIS — O02.1 MISSED ABORTION: Primary | ICD-10-CM

## 2024-04-09 LAB
ERYTHROCYTE [DISTWIDTH] IN BLOOD BY AUTOMATED COUNT: 12.3 % (ref 10–15)
HBV SURFACE AG SERPL QL IA: NONREACTIVE
HCG INTACT+B SERPL-ACNC: ABNORMAL MIU/ML
HCG INTACT+B SERPL-ACNC: ABNORMAL MIU/ML
HCT VFR BLD AUTO: 34.9 % (ref 35–47)
HCV AB SERPL QL IA: NONREACTIVE
HGB BLD-MCNC: 12 G/DL (ref 11.7–15.7)
HIV 1+2 AB+HIV1 P24 AG SERPL QL IA: NONREACTIVE
MCH RBC QN AUTO: 29.2 PG (ref 26.5–33)
MCHC RBC AUTO-ENTMCNC: 34.4 G/DL (ref 31.5–36.5)
MCV RBC AUTO: 85 FL (ref 78–100)
PLATELET # BLD AUTO: 222 10E3/UL (ref 150–450)
RBC # BLD AUTO: 4.11 10E6/UL (ref 3.8–5.2)
RUBV IGG SERPL QL IA: 22.5 INDEX
RUBV IGG SERPL QL IA: POSITIVE
T PALLIDUM AB SER QL: NONREACTIVE
WBC # BLD AUTO: 7.3 10E3/UL (ref 4–11)

## 2024-04-09 PROCEDURE — 85027 COMPLETE CBC AUTOMATED: CPT

## 2024-04-09 PROCEDURE — 86900 BLOOD TYPING SEROLOGIC ABO: CPT

## 2024-04-09 PROCEDURE — 87086 URINE CULTURE/COLONY COUNT: CPT

## 2024-04-09 PROCEDURE — 84702 CHORIONIC GONADOTROPIN TEST: CPT

## 2024-04-09 PROCEDURE — 36415 COLL VENOUS BLD VENIPUNCTURE: CPT

## 2024-04-09 PROCEDURE — 86762 RUBELLA ANTIBODY: CPT

## 2024-04-09 PROCEDURE — 86850 RBC ANTIBODY SCREEN: CPT

## 2024-04-09 PROCEDURE — 87340 HEPATITIS B SURFACE AG IA: CPT

## 2024-04-09 PROCEDURE — 76817 TRANSVAGINAL US OBSTETRIC: CPT | Performed by: OBSTETRICS & GYNECOLOGY

## 2024-04-09 PROCEDURE — 86780 TREPONEMA PALLIDUM: CPT

## 2024-04-09 PROCEDURE — 86901 BLOOD TYPING SEROLOGIC RH(D): CPT

## 2024-04-09 PROCEDURE — 99442 PR PHYSICIAN TELEPHONE EVALUATION 11-20 MIN: CPT | Performed by: STUDENT IN AN ORGANIZED HEALTH CARE EDUCATION/TRAINING PROGRAM

## 2024-04-09 PROCEDURE — 87389 HIV-1 AG W/HIV-1&-2 AB AG IA: CPT

## 2024-04-09 PROCEDURE — 76801 OB US < 14 WKS SINGLE FETUS: CPT | Performed by: OBSTETRICS & GYNECOLOGY

## 2024-04-09 PROCEDURE — 86803 HEPATITIS C AB TEST: CPT

## 2024-04-09 NOTE — PROGRESS NOTES
Marisel Perry is a 33 year old female who is being evaluated via a billable telephone visit.      What phone number would you like to be contacted at? [phone]  How would you like to obtain your AVS? Efrain    Patient location for call: Home in [place]  Provider location for call: Medical office in [place], [place]    Marisel Perry is a 33 year old female who presents for virtual visit today for the following health issue(s):  Patient presents with:  Follow Up: Follow up to  U/S today        [facility]  Gynecology Office Visit - Phone Visit    CC: Abnormal OB US    S:  Marisel Perry is a 33 year old  who presents for the above.    - New OB US today that showed no IUP with 27 mm irregular thickened endometrium.  - By LMP today she is 7w1d   - bHCG returned at 70,518  - She reports feeling well. Had 4-5 drops of spotting last week but otherwise has had not bleeding or cramping.   - A little spotting last week 4-5 last week    O:  LMP 2024 (Exact Date)     Exam:  PULM: Speaking in full sentences    Labs:  [facility] [place]  [id number]  [id number]    Imaging:  OB US 24  INDICATIONS FOR ULTRASOUND:  OB History: 1st trimester loss (miscarriage, ectopic)  Present Conditions: Bleeding in pregnancy 1st trimester  Initial S&D (0-17 weeks)     CLINICAL INFORMATION                                                                                                                                                                                                              LMP: 2024 - unsure EDC: 2024   EGA: 7 w 1 d                                                                                                ===================     MEASUREMENTS  Gest Sac: nv       Yolk sac: nv   CRL: nv     Uterus: 8.5 x 7.3 x 5.6 cm   Endo   27.24 mm  irregular  Rt Ov L: 3.2 x 3.8 x 2.2  cm   Complex cyst 1.9 x 1.8 x 2.1 cm  Lt Ov L: 2.8 x 2.7 x 1.4 cm    Wnl  Cul de sac: no free fluid     *Other  Findings:   Technique: Transvaginal Imaging performed  Transabdominal Imaging performed     ======================================  Impression:     1. Irregular thickened endometrium.  No evidence for IUP.  Clinical correlation recommended.   2. Complex right ovarian cyst, suggestive of corpus luteum.      A/P:  Marisel Perry is a 33 year old  at 7w1d by LMP who is here to discuss abnormal NOB US. As above US shows no IUP with thickened, irregular EMS, c/f molar pregnancy. Her bHCG is also higher than expected at around 70,000. Miscarriage is also in the differential but she has had no bleeding or cramping.     #Abnormal Pregnancy  #c/f Molar Pregnancy  - Discussed the abnormal findings and differential  - Recommend D&C for diagnostic and therapeutic purposes. Discussed r/b/a including higher risk of surgical bleeding given potential molar pregnancy. Questions answered, and she agrees to proceed.  - Case scheduled for 1105 04/10/24 with Dr. Fisher  - H&P and consent to be signed in pre-op day of surgery    Arnoldo Max MD [facility]  2024 3:23 PM    14 minutes spent by me on the date of the encounter on the phone with patient.

## 2024-04-09 NOTE — NURSING NOTE
"Chief Complaint   Patient presents with    Follow Up     Follow up to  U/S today           Initial LMP 2024 (Exact Date)  Estimated body mass index is 22.92 kg/m  as calculated from the following:    Height as of 20: 1.499 m (4' 11\").    Weight as of 10/31/23: 51.5 kg (113 lb 8 oz).  BP completed using cuff size: NA (Not Taken)    Questioned patient about current smoking habits.  Pt. has never smoked.          The following HM Due: NONE      Cait Neff, CMA on 2024 at 2:51 PM         "

## 2024-04-10 ENCOUNTER — ANESTHESIA (OUTPATIENT)
Dept: SURGERY | Facility: CLINIC | Age: 34
End: 2024-04-10
Payer: COMMERCIAL

## 2024-04-10 ENCOUNTER — ANESTHESIA EVENT (OUTPATIENT)
Dept: SURGERY | Facility: CLINIC | Age: 34
End: 2024-04-10
Payer: COMMERCIAL

## 2024-04-10 ENCOUNTER — HOSPITAL ENCOUNTER (OUTPATIENT)
Dept: ULTRASOUND IMAGING | Facility: CLINIC | Age: 34
Discharge: HOME | End: 2024-04-10
Attending: OBSTETRICS & GYNECOLOGY | Admitting: OBSTETRICS & GYNECOLOGY
Payer: COMMERCIAL

## 2024-04-10 ENCOUNTER — HOSPITAL ENCOUNTER (OUTPATIENT)
Facility: CLINIC | Age: 34
Discharge: HOME | End: 2024-04-10
Attending: OBSTETRICS & GYNECOLOGY | Admitting: OBSTETRICS & GYNECOLOGY
Payer: COMMERCIAL

## 2024-04-10 VITALS
BODY MASS INDEX: 23.21 KG/M2 | TEMPERATURE: 97.6 F | OXYGEN SATURATION: 100 % | DIASTOLIC BLOOD PRESSURE: 55 MMHG | HEART RATE: 53 BPM | SYSTOLIC BLOOD PRESSURE: 105 MMHG | RESPIRATION RATE: 12 BRPM | WEIGHT: 114.9 LBS

## 2024-04-10 DIAGNOSIS — O02.1 MISSED ABORTION: ICD-10-CM

## 2024-04-10 LAB — BACTERIA UR CULT: NORMAL

## 2024-04-10 PROCEDURE — 88342 IMHCHEM/IMCYTCHM 1ST ANTB: CPT | Mod: TC | Performed by: OBSTETRICS & GYNECOLOGY

## 2024-04-10 PROCEDURE — 258N000003 HC RX IP 258 OP 636: Performed by: ANESTHESIOLOGY

## 2024-04-10 PROCEDURE — 250N000013 HC RX MED GY IP 250 OP 250 PS 637: Performed by: OBSTETRICS & GYNECOLOGY

## 2024-04-10 PROCEDURE — 250N000011 HC RX IP 250 OP 636: Performed by: OBSTETRICS & GYNECOLOGY

## 2024-04-10 PROCEDURE — 258N000003 HC RX IP 258 OP 636: Performed by: NURSE ANESTHETIST, CERTIFIED REGISTERED

## 2024-04-10 PROCEDURE — 250N000009 HC RX 250: Performed by: OBSTETRICS & GYNECOLOGY

## 2024-04-10 PROCEDURE — 272N000001 HC OR GENERAL SUPPLY STERILE: Performed by: OBSTETRICS & GYNECOLOGY

## 2024-04-10 PROCEDURE — 250N000011 HC RX IP 250 OP 636: Performed by: ANESTHESIOLOGY

## 2024-04-10 PROCEDURE — 370N000017 HC ANESTHESIA TECHNICAL FEE, PER MIN: Performed by: OBSTETRICS & GYNECOLOGY

## 2024-04-10 PROCEDURE — 250N000025 HC SEVOFLURANE, PER MIN: Performed by: OBSTETRICS & GYNECOLOGY

## 2024-04-10 PROCEDURE — 360N000075 HC SURGERY LEVEL 2, PER MIN: Performed by: OBSTETRICS & GYNECOLOGY

## 2024-04-10 PROCEDURE — 710N000012 HC RECOVERY PHASE 2, PER MINUTE: Performed by: OBSTETRICS & GYNECOLOGY

## 2024-04-10 PROCEDURE — 250N000011 HC RX IP 250 OP 636: Performed by: NURSE ANESTHETIST, CERTIFIED REGISTERED

## 2024-04-10 PROCEDURE — 250N000013 HC RX MED GY IP 250 OP 250 PS 637: Performed by: ANESTHESIOLOGY

## 2024-04-10 PROCEDURE — 999N000063 US INTRAOPERATIVE: Mod: TC

## 2024-04-10 PROCEDURE — 59812 TREATMENT OF MISCARRIAGE: CPT | Performed by: OBSTETRICS & GYNECOLOGY

## 2024-04-10 PROCEDURE — 88305 TISSUE EXAM BY PATHOLOGIST: CPT | Mod: 26 | Performed by: STUDENT IN AN ORGANIZED HEALTH CARE EDUCATION/TRAINING PROGRAM

## 2024-04-10 PROCEDURE — 710N000009 HC RECOVERY PHASE 1, LEVEL 1, PER MIN: Performed by: OBSTETRICS & GYNECOLOGY

## 2024-04-10 PROCEDURE — 250N000009 HC RX 250: Performed by: NURSE ANESTHETIST, CERTIFIED REGISTERED

## 2024-04-10 PROCEDURE — 999N000141 HC STATISTIC PRE-PROCEDURE NURSING ASSESSMENT: Performed by: OBSTETRICS & GYNECOLOGY

## 2024-04-10 PROCEDURE — 88342 IMHCHEM/IMCYTCHM 1ST ANTB: CPT | Mod: 26 | Performed by: STUDENT IN AN ORGANIZED HEALTH CARE EDUCATION/TRAINING PROGRAM

## 2024-04-10 PROCEDURE — 76998 US GUIDE INTRAOP: CPT | Mod: 26 | Performed by: OBSTETRICS & GYNECOLOGY

## 2024-04-10 RX ORDER — ONDANSETRON 2 MG/ML
INJECTION INTRAMUSCULAR; INTRAVENOUS PRN
Status: DISCONTINUED | OUTPATIENT
Start: 2024-04-10 | End: 2024-04-10

## 2024-04-10 RX ORDER — FENTANYL CITRATE 50 UG/ML
25 INJECTION, SOLUTION INTRAMUSCULAR; INTRAVENOUS EVERY 5 MIN PRN
Status: DISCONTINUED | OUTPATIENT
Start: 2024-04-10 | End: 2024-04-10 | Stop reason: HOSPADM

## 2024-04-10 RX ORDER — MAGNESIUM HYDROXIDE 1200 MG/15ML
LIQUID ORAL PRN
Status: DISCONTINUED | OUTPATIENT
Start: 2024-04-10 | End: 2024-04-10 | Stop reason: HOSPADM

## 2024-04-10 RX ORDER — SODIUM CHLORIDE, SODIUM LACTATE, POTASSIUM CHLORIDE, CALCIUM CHLORIDE 600; 310; 30; 20 MG/100ML; MG/100ML; MG/100ML; MG/100ML
INJECTION, SOLUTION INTRAVENOUS CONTINUOUS
Status: DISCONTINUED | OUTPATIENT
Start: 2024-04-10 | End: 2024-04-10 | Stop reason: HOSPADM

## 2024-04-10 RX ORDER — FENTANYL CITRATE 50 UG/ML
50 INJECTION, SOLUTION INTRAMUSCULAR; INTRAVENOUS EVERY 5 MIN PRN
Status: DISCONTINUED | OUTPATIENT
Start: 2024-04-10 | End: 2024-04-10 | Stop reason: HOSPADM

## 2024-04-10 RX ORDER — GLYCOPYRROLATE 0.2 MG/ML
INJECTION, SOLUTION INTRAMUSCULAR; INTRAVENOUS PRN
Status: DISCONTINUED | OUTPATIENT
Start: 2024-04-10 | End: 2024-04-10

## 2024-04-10 RX ORDER — ACETAMINOPHEN 325 MG/1
975 TABLET ORAL ONCE
Status: COMPLETED | OUTPATIENT
Start: 2024-04-10 | End: 2024-04-10

## 2024-04-10 RX ORDER — LIDOCAINE HYDROCHLORIDE 10 MG/ML
INJECTION, SOLUTION INFILTRATION; PERINEURAL PRN
Status: DISCONTINUED | OUTPATIENT
Start: 2024-04-10 | End: 2024-04-10

## 2024-04-10 RX ORDER — OXYCODONE HYDROCHLORIDE 5 MG/1
10 TABLET ORAL
Status: DISCONTINUED | OUTPATIENT
Start: 2024-04-10 | End: 2024-04-10 | Stop reason: HOSPADM

## 2024-04-10 RX ORDER — IBUPROFEN 800 MG/1
800 TABLET, FILM COATED ORAL ONCE
Qty: 1 TABLET | Refills: 0 | Status: DISCONTINUED | OUTPATIENT
Start: 2024-04-10 | End: 2024-04-10 | Stop reason: HOSPADM

## 2024-04-10 RX ORDER — HYDROMORPHONE HCL IN WATER/PF 6 MG/30 ML
0.4 PATIENT CONTROLLED ANALGESIA SYRINGE INTRAVENOUS EVERY 5 MIN PRN
Status: DISCONTINUED | OUTPATIENT
Start: 2024-04-10 | End: 2024-04-10 | Stop reason: HOSPADM

## 2024-04-10 RX ORDER — ONDANSETRON 2 MG/ML
4 INJECTION INTRAMUSCULAR; INTRAVENOUS EVERY 30 MIN PRN
Status: DISCONTINUED | OUTPATIENT
Start: 2024-04-10 | End: 2024-04-10 | Stop reason: HOSPADM

## 2024-04-10 RX ORDER — DOXYCYCLINE 100 MG/10ML
100 INJECTION, POWDER, LYOPHILIZED, FOR SOLUTION INTRAVENOUS
Status: COMPLETED | OUTPATIENT
Start: 2024-04-10 | End: 2024-04-10

## 2024-04-10 RX ORDER — KETOROLAC TROMETHAMINE 30 MG/ML
INJECTION, SOLUTION INTRAMUSCULAR; INTRAVENOUS PRN
Status: DISCONTINUED | OUTPATIENT
Start: 2024-04-10 | End: 2024-04-10

## 2024-04-10 RX ORDER — ONDANSETRON 4 MG/1
4 TABLET, ORALLY DISINTEGRATING ORAL EVERY 30 MIN PRN
Status: DISCONTINUED | OUTPATIENT
Start: 2024-04-10 | End: 2024-04-10 | Stop reason: HOSPADM

## 2024-04-10 RX ORDER — OXYCODONE HYDROCHLORIDE 5 MG/1
5 TABLET ORAL
Status: COMPLETED | OUTPATIENT
Start: 2024-04-10 | End: 2024-04-10

## 2024-04-10 RX ORDER — FENTANYL CITRATE 50 UG/ML
INJECTION, SOLUTION INTRAMUSCULAR; INTRAVENOUS PRN
Status: DISCONTINUED | OUTPATIENT
Start: 2024-04-10 | End: 2024-04-10

## 2024-04-10 RX ORDER — NALOXONE HYDROCHLORIDE 0.4 MG/ML
0.1 INJECTION, SOLUTION INTRAMUSCULAR; INTRAVENOUS; SUBCUTANEOUS
Status: DISCONTINUED | OUTPATIENT
Start: 2024-04-10 | End: 2024-04-10 | Stop reason: HOSPADM

## 2024-04-10 RX ORDER — ACETAMINOPHEN 325 MG/1
975 TABLET ORAL ONCE
Qty: 3 TABLET | Refills: 0 | Status: DISCONTINUED | OUTPATIENT
Start: 2024-04-10 | End: 2024-04-10 | Stop reason: HOSPADM

## 2024-04-10 RX ORDER — DEXAMETHASONE SODIUM PHOSPHATE 4 MG/ML
INJECTION, SOLUTION INTRA-ARTICULAR; INTRALESIONAL; INTRAMUSCULAR; INTRAVENOUS; SOFT TISSUE PRN
Status: DISCONTINUED | OUTPATIENT
Start: 2024-04-10 | End: 2024-04-10

## 2024-04-10 RX ORDER — LIDOCAINE 40 MG/G
CREAM TOPICAL
Status: DISCONTINUED | OUTPATIENT
Start: 2024-04-10 | End: 2024-04-10 | Stop reason: HOSPADM

## 2024-04-10 RX ORDER — PROPOFOL 10 MG/ML
INJECTION, EMULSION INTRAVENOUS PRN
Status: DISCONTINUED | OUTPATIENT
Start: 2024-04-10 | End: 2024-04-10

## 2024-04-10 RX ORDER — HYDROMORPHONE HCL IN WATER/PF 6 MG/30 ML
0.2 PATIENT CONTROLLED ANALGESIA SYRINGE INTRAVENOUS EVERY 5 MIN PRN
Status: DISCONTINUED | OUTPATIENT
Start: 2024-04-10 | End: 2024-04-10 | Stop reason: HOSPADM

## 2024-04-10 RX ADMIN — KETOROLAC TROMETHAMINE 30 MG: 30 INJECTION, SOLUTION INTRAMUSCULAR at 11:22

## 2024-04-10 RX ADMIN — FENTANYL CITRATE 25 MCG: 50 INJECTION, SOLUTION INTRAMUSCULAR; INTRAVENOUS at 11:51

## 2024-04-10 RX ADMIN — SODIUM CHLORIDE, POTASSIUM CHLORIDE, SODIUM LACTATE AND CALCIUM CHLORIDE: 600; 310; 30; 20 INJECTION, SOLUTION INTRAVENOUS at 10:14

## 2024-04-10 RX ADMIN — DEXAMETHASONE SODIUM PHOSPHATE 4 MG: 4 INJECTION, SOLUTION INTRA-ARTICULAR; INTRALESIONAL; INTRAMUSCULAR; INTRAVENOUS; SOFT TISSUE at 10:52

## 2024-04-10 RX ADMIN — FENTANYL CITRATE 50 MCG: 50 INJECTION, SOLUTION INTRAMUSCULAR; INTRAVENOUS at 12:00

## 2024-04-10 RX ADMIN — SODIUM CHLORIDE, POTASSIUM CHLORIDE, SODIUM LACTATE AND CALCIUM CHLORIDE: 600; 310; 30; 20 INJECTION, SOLUTION INTRAVENOUS at 11:54

## 2024-04-10 RX ADMIN — PHENYLEPHRINE HYDROCHLORIDE 100 MCG: 10 INJECTION INTRAVENOUS at 10:59

## 2024-04-10 RX ADMIN — DOXYCYCLINE 100 MG: 100 INJECTION, POWDER, LYOPHILIZED, FOR SOLUTION INTRAVENOUS at 10:49

## 2024-04-10 RX ADMIN — ACETAMINOPHEN 650 MG: 325 TABLET, FILM COATED ORAL at 09:52

## 2024-04-10 RX ADMIN — OXYCODONE HYDROCHLORIDE 5 MG: 5 TABLET ORAL at 12:30

## 2024-04-10 RX ADMIN — ONDANSETRON 4 MG: 2 INJECTION INTRAMUSCULAR; INTRAVENOUS at 11:22

## 2024-04-10 RX ADMIN — GLYCOPYRROLATE 0.2 MG: 0.2 INJECTION, SOLUTION INTRAMUSCULAR; INTRAVENOUS at 10:52

## 2024-04-10 RX ADMIN — FENTANYL CITRATE 100 MCG: 50 INJECTION INTRAMUSCULAR; INTRAVENOUS at 10:52

## 2024-04-10 RX ADMIN — PROPOFOL 200 MG: 10 INJECTION, EMULSION INTRAVENOUS at 10:52

## 2024-04-10 RX ADMIN — LIDOCAINE HYDROCHLORIDE 30 MG: 10 INJECTION, SOLUTION INFILTRATION; PERINEURAL at 10:53

## 2024-04-10 RX ADMIN — MIDAZOLAM 2 MG: 1 INJECTION INTRAMUSCULAR; INTRAVENOUS at 10:49

## 2024-04-10 ASSESSMENT — ACTIVITIES OF DAILY LIVING (ADL)
ADLS_ACUITY_SCORE: 29

## 2024-04-10 NOTE — ANESTHESIA PROCEDURE NOTES
Airway       Patient location during procedure: OR       Procedure Start/Stop Times: 4/10/2024 10:55 AM  Staff -        CRNA: Vna Baumann APRN CRNA       Performed By: CRNAIndications and Patient Condition       Indications for airway management: jenni-procedural and airway protection       Induction type:intravenous       Mask difficulty assessment: 0 - not attempted    Final Airway Details       Final airway type: supraglottic airway    Supraglottic Airway Details        Type: LMA       Brand: I-Gel       LMA size: 4    Post intubation assessment        Placement verified by: capnometry, equal breath sounds and chest rise        Number of attempts at approach: 1       Secured with: commercial tube lizama       Ease of procedure: easy       Dentition: Intact    Medication(s) Administered   Medication Administration Time: 4/10/2024 10:55 AM

## 2024-04-10 NOTE — ANESTHESIA POSTPROCEDURE EVALUATION
Patient: Marisel Perry    Procedure: Procedure(s):  DILATION AND CURETTAGE, UTERUS, USING SUCTION with ultrasound, repair of vaginal laceration       Anesthesia Type:  General    Note:     Postop Pain Control: Uneventful            Sign Out: Well controlled pain   PONV: No   Neuro/Psych: Uneventful            Sign Out: Acceptable/Baseline neuro status   Airway/Respiratory:             Sign Out: Acceptable/Baseline resp. status   CV/Hemodynamics:             Sign Out: Acceptable CV status   Other NRE:    DID A NON-ROUTINE EVENT OCCUR?            Last vitals:  Vitals Value Taken Time   /56 04/10/24 1300   Temp 97.1  F (36.2  C) 04/10/24 1215   Pulse 57 04/10/24 1301   Resp 8 04/10/24 1301   SpO2 100 % 04/10/24 1301   Vitals shown include unfiled device data.    Electronically Signed By: Owen Partida DO  April 10, 2024  2:25 PM

## 2024-04-10 NOTE — OP NOTE
DILATION AND SUCTION CURETTAGE OPERATIVE NOTE    Preoperative Diagnosis: Abnormal intrauterine pregnancy, suspected molar or partial molar pregnancy  Postoperative Diagnosis: Same  Procedure(s): EUA, Cervical dilation, suction curettage, repair of vaginal laceration  Surgeon: Miladis Fisher MD   Type of anesthesia: General  Complications: Vaginal laceration occurred with vaginal prep, which was repaired at the close of the procedure.  EBL: 50 cc  Findings: Anteverted uterus, 8 weeks size.  Specimen(s) removed: Products of conception    Indications:   hCG over 70,000, abnormal appearance of endometrium on ultrasound, with no intrauterine gestational sac, suspicious for possible molar pregnancy.  Risks, benefits and alternative treatments have been discussed with the patient. Informed consent obtained.     Procedure:   The patient was taken to the operating room where general anesthesia was administered. She was prepared and draped in the normal sterile fashion in the dorsal lithotomy position. EUA peformed showed anteverted uterus. A bivalve speculum was inserted in the vagina. A single tooth tenaculum was used to grasp the anterior lip of the cervix. The Hegar dilators were used to dilate the cervix to 8 mm under ultrasound guidance. A 8 mm curved suction curette was then gently inserted to the level of the fundus, and products of conception were evacuated in several passes, also under ultrasound guidance. Gentle exploration of the cavity with a curette was productive of minimal further tissue, and then one additional pass with the suction curette did not produce any further specimen.  Ultrasound showed endometrial stripe less than 1 mm in thickness.  Specimen sent to pathology; chromosomal analysis was not requested. All instruments were then removed. Tenaculum sites appeared to be hemostatic with pressure and electrocautery.  There was brisk bleeding noted which was not coming from the cervix.  Elevation of the  cervix posteriorly revealed a 2.5 cm vaginal laceration that likely occurred with the vaginal prep prior to starting the case, as she did have the onset of vaginal bleeding prior to the start of the case.  This area was gently probed digitally and did not extend beyond the mucosa.  The vaginal mucosa was then closed with a running locking suture of 2-0 Vicryl.  This provided excellent hemostasis and completely closed the defect.  The patient tolerated the procedure well and was transferred in stable condition to the PACU.     Blood type is Rh positive; Rhogam is not indicated.    Miladis Fisher MD  April 10, 2024  11:40 AM

## 2024-04-10 NOTE — ANESTHESIA PREPROCEDURE EVALUATION
Anesthesia Pre-Procedure Evaluation    Patient: Marisel Perry   MRN: [id number] : 1990        Procedure : Procedure(s):  DILATION AND CURETTAGE, UTERUS, USING SUCTION with ultrasound          Past Medical History:   Diagnosis Date    History of blood transfusion     as a child    Hypothyroidism     Primary female infertility     Vacuum extraction, delivered, current hospitalization 2020    Varicella       Past Surgical History:   Procedure Laterality Date    ESOPHAGOSCOPY, GASTROSCOPY, DUODENOSCOPY (EGD), COMBINED N/A 2019    Procedure: ESOPHAGOGASTRODUODENOSCOPY, WITH BIOPSY;  Surgeon: Evan Aaron MD;  Location:  GI      No Known Allergies   Social History     Tobacco Use    Smoking status: Never     Passive exposure: Never    Smokeless tobacco: Never   Substance Use Topics    Alcohol use: No      Wt Readings from Last 1 Encounters:   10/31/23 51.5 kg (113 lb 8 oz)        Anesthesia Evaluation            ROS/MED HX  ENT/Pulmonary:  - neg pulmonary ROS     Neurologic:  - neg neurologic ROS     Cardiovascular:  - neg cardiovascular ROS     METS/Exercise Tolerance: >4 METS    Hematologic:  - neg hematologic  ROS     Musculoskeletal:  - neg musculoskeletal ROS     GI/Hepatic:  - neg GI/hepatic ROS     Renal/Genitourinary:  - neg Renal ROS     Endo:     (+)          thyroid problem,            Psychiatric/Substance Use:  - neg psychiatric ROS     Infectious Disease:  - neg infectious disease ROS     Malignancy:  - neg malignancy ROS     Other:  - neg other ROS          Physical Exam    Airway        Mallampati: II    Neck ROM: full     Respiratory Devices and Support         Dental           Cardiovascular   cardiovascular exam normal       Rhythm and rate: regular     Pulmonary   pulmonary exam normal                OUTSIDE LABS:  CBC:   Lab Results   Component Value Date    WBC 7.3 2024    WBC 5.3 10/31/2023    HGB 12.0 2024    HGB 12.7 10/31/2023    HCT 34.9 (L)  "04/09/2024    HCT 37.1 10/31/2023     04/09/2024     10/31/2023     BMP:   Lab Results   Component Value Date     10/31/2023     06/09/2020    POTASSIUM 3.9 10/31/2023    POTASSIUM 3.8 06/09/2020    CHLORIDE 106 10/31/2023    CHLORIDE 98 06/09/2020    CO2 26 10/31/2023    CO2 27 06/09/2020    BUN 10.9 10/31/2023    BUN 5 (L) 06/09/2020    CR 0.73 10/31/2023    CR 0.60 06/09/2020    GLC 84 10/31/2023     (H) 06/09/2020     COAGS: No results found for: \"PTT\", \"INR\", \"FIBR\"  POC:   Lab Results   Component Value Date    HCG Negative 05/02/2019     HEPATIC:   Lab Results   Component Value Date    ALBUMIN 4.3 10/31/2023    PROTTOTAL 7.6 10/31/2023    ALT 13 10/31/2023    AST 23 10/31/2023    ALKPHOS 54 10/31/2023    BILITOTAL 0.3 10/31/2023     OTHER:   Lab Results   Component Value Date    A1C 5.2 01/17/2019    BRIAN 9.3 10/31/2023    TSH 3.61 10/31/2023    T4 1.35 08/20/2019       Anesthesia Plan    ASA Status:  2       Anesthesia Type: General.   Induction: Intravenous, Propofol.   Maintenance: Balanced.        Consents    Anesthesia Plan(s) and associated risks, benefits, and realistic alternatives discussed. Questions answered and patient/representative(s) expressed understanding.     - Discussed:     - Discussed with:  Patient            Postoperative Care    Pain management: IV analgesics, Oral pain medications, Multi-modal analgesia.   PONV prophylaxis: Ondansetron (or other 5HT-3), Dexamethasone or Solumedrol     Comments:               Owen Partida, DO    I have reviewed the pertinent notes and labs in the chart from the past 30 days and (re)examined the patient.  Any updates or changes from those notes are reflected in this note.                  "

## 2024-04-10 NOTE — DISCHARGE INSTRUCTIONS
Maximum acetaminophen (Tylenol) dose from all sources should not exceed 4 grams (4000 mg) per day. You last had 975mg at 10am, your next dose is 4pm or later.     You received Toradol, an IV form of Ibuprofen (Motrin) at 11:20am.  Do not take any Ibuprofen products until 5:20pm.     ROMANA HERNÁNDEZ MD     CLINIC PHONE NUMBER:  [phone]  [facility]

## 2024-04-10 NOTE — ANESTHESIA CARE TRANSFER NOTE
Patient: Marisel Perry    Procedure: Procedure(s):  DILATION AND CURETTAGE, UTERUS, USING SUCTION with ultrasound, repair of vaginal laceration       Diagnosis: Missed  [O02.1]  Diagnosis Additional Information: No value filed.    Anesthesia Type:   General     Note:    Oropharynx: oropharynx clear of all foreign objects and spontaneously breathing  Level of Consciousness: drowsy  Oxygen Supplementation: face mask  Level of Supplemental Oxygen (L/min / FiO2): 10  Independent Airway: airway patency satisfactory and stable  Dentition: dentition unchanged  Vital Signs Stable: post-procedure vital signs reviewed and stable  Report to RN Given: handoff report given  Patient transferred to: PACU    Handoff Report: Identifed the Patient, Identified the Reponsible Provider, Reviewed the pertinent medical history, Discussed the surgical course, Reviewed Intra-OP anesthesia mangement and issues during anesthesia, Set expectations for post-procedure period and Allowed opportunity for questions and acknowledgement of understanding      Vitals:  Vitals Value Taken Time   BP     Temp     Pulse     Resp 11 04/10/24 1136   SpO2 100 % 04/10/24 1136   Vitals shown include unfiled device data.    Electronically Signed By: GOLDY Rodriguez CRNA  April 10, 2024  11:37 AM

## 2024-04-12 LAB
PATH REPORT.COMMENTS IMP SPEC: NORMAL
PATH REPORT.FINAL DX SPEC: NORMAL
PATH REPORT.GROSS SPEC: NORMAL
PATH REPORT.MICROSCOPIC SPEC OTHER STN: NORMAL
PATH REPORT.RELEVANT HX SPEC: NORMAL
PHOTO IMAGE: NORMAL

## 2024-04-23 ENCOUNTER — OFFICE VISIT (OUTPATIENT)
Dept: OBGYN | Facility: CLINIC | Age: 34
End: 2024-04-23
Payer: COMMERCIAL

## 2024-04-23 VITALS — DIASTOLIC BLOOD PRESSURE: 62 MMHG | WEIGHT: 112 LBS | SYSTOLIC BLOOD PRESSURE: 88 MMHG | BODY MASS INDEX: 22.62 KG/M2

## 2024-04-23 DIAGNOSIS — O02.0 MOLAR PREGNANCY: Primary | ICD-10-CM

## 2024-04-23 PROCEDURE — 84702 CHORIONIC GONADOTROPIN TEST: CPT | Performed by: OBSTETRICS & GYNECOLOGY

## 2024-04-23 PROCEDURE — 36415 COLL VENOUS BLD VENIPUNCTURE: CPT | Performed by: OBSTETRICS & GYNECOLOGY

## 2024-04-23 PROCEDURE — 99024 POSTOP FOLLOW-UP VISIT: CPT | Performed by: OBSTETRICS & GYNECOLOGY

## 2024-04-23 NOTE — PROGRESS NOTES
SUBJECTIVE:                                                     Marisel Perry is a 33 year old female  who presents today for postop visit following suction curettage for missed . Pathology returned complete hyatidiform mole. She is here to discuss follow up. She has recovered well from the procedure.      Problem list and histories reviewed & adjusted, as indicated.  Additional history: as documented.    Patient Active Problem List   Diagnosis    Female infertility    History of miscarriage    Vacuum extraction, delivered, current hospitalization     Past Surgical History:   Procedure Laterality Date    DILATION AND CURETTAGE SUCTION N/A 4/10/2024    Procedure: DILATION AND CURETTAGE, UTERUS, USING SUCTION with ultrasound, repair of vaginal laceration;  Surgeon: Mialdis Fisher MD;  Location:  OR    ESOPHAGOSCOPY, GASTROSCOPY, DUODENOSCOPY (EGD), COMBINED N/A 2019    Procedure: ESOPHAGOGASTRODUODENOSCOPY, WITH BIOPSY;  Surgeon: Evan Aaron MD;  Location:  GI      Social History     Tobacco Use    Smoking status: Never     Passive exposure: Never    Smokeless tobacco: Never   Substance Use Topics    Alcohol use: No      Problem (# of Occurrences) Relation (Name,Age of Onset)    Diabetes (1) Father    Diabetes Type 2  (1) Father              No current outpatient medications on file.     No current facility-administered medications for this visit.     No Known Allergies    ROS:  Negative other than as noted in HPI.    OBJECTIVE:     Exam:  Constitutional:  Appearance: Well nourished, well developed alert, in no acute distress  Neurologic/Psychiatric:  Mental Status:  Oriented X3       In-Clinic Test Results:  No results found for this or any previous visit (from the past 24 hour(s)).    ASSESSMENT/PLAN:                                                        ICD-10-CM    1. Molar pregnancy  O02.0             Plan is:  HCG today and weekly, for 4 weeks, then if decreasing  appropriately (by 10% or greater with each check), monthly for 3 months when down to <5. If no plateau and continued negative blood work after month 3, may choose to conceive again.  Would recommend early serial beta hCGs and a 6 to 7-week transvaginal ultrasound in a future pregnancy.    If hCG levels plateau or increase, we discussed referral to GYN oncology.    All questions answered.  Labs are ordered.    Miladis Fisher MD  [facility] [place]

## 2024-04-24 LAB — HCG INTACT+B SERPL-ACNC: 222 MIU/ML

## 2024-04-30 ENCOUNTER — LAB (OUTPATIENT)
Dept: LAB | Facility: CLINIC | Age: 34
End: 2024-04-30
Payer: COMMERCIAL

## 2024-04-30 PROCEDURE — 84702 CHORIONIC GONADOTROPIN TEST: CPT

## 2024-04-30 PROCEDURE — 36415 COLL VENOUS BLD VENIPUNCTURE: CPT

## 2024-05-01 LAB — HCG INTACT+B SERPL-ACNC: 46 MIU/ML

## 2024-05-07 ENCOUNTER — LAB (OUTPATIENT)
Dept: LAB | Facility: CLINIC | Age: 34
End: 2024-05-07
Payer: COMMERCIAL

## 2024-05-07 LAB — HCG INTACT+B SERPL-ACNC: 16 MIU/ML

## 2024-05-07 PROCEDURE — 36415 COLL VENOUS BLD VENIPUNCTURE: CPT

## 2024-05-07 PROCEDURE — 84702 CHORIONIC GONADOTROPIN TEST: CPT

## 2024-05-14 ENCOUNTER — LAB (OUTPATIENT)
Dept: LAB | Facility: CLINIC | Age: 34
End: 2024-05-14
Payer: COMMERCIAL

## 2024-05-14 PROCEDURE — 84702 CHORIONIC GONADOTROPIN TEST: CPT

## 2024-05-14 PROCEDURE — 36415 COLL VENOUS BLD VENIPUNCTURE: CPT

## 2024-05-15 LAB — HCG INTACT+B SERPL-ACNC: 6 MIU/ML

## 2024-05-21 ENCOUNTER — LAB (OUTPATIENT)
Dept: LAB | Facility: CLINIC | Age: 34
End: 2024-05-21
Payer: COMMERCIAL

## 2024-05-21 DIAGNOSIS — O02.1 MISSED ABORTION: ICD-10-CM

## 2024-05-21 LAB — HCG INTACT+B SERPL-ACNC: 3 MIU/ML

## 2024-05-21 PROCEDURE — 84702 CHORIONIC GONADOTROPIN TEST: CPT

## 2024-05-21 PROCEDURE — 36415 COLL VENOUS BLD VENIPUNCTURE: CPT

## 2024-05-22 DIAGNOSIS — O02.0 MOLAR PREGNANCY: Primary | ICD-10-CM

## 2024-06-24 ENCOUNTER — OFFICE VISIT (OUTPATIENT)
Dept: FAMILY MEDICINE | Facility: CLINIC | Age: 34
End: 2024-06-24
Payer: COMMERCIAL

## 2024-06-24 VITALS
SYSTOLIC BLOOD PRESSURE: 114 MMHG | HEIGHT: 59 IN | BODY MASS INDEX: 23.55 KG/M2 | RESPIRATION RATE: 16 BRPM | HEART RATE: 76 BPM | DIASTOLIC BLOOD PRESSURE: 67 MMHG | WEIGHT: 116.8 LBS | OXYGEN SATURATION: 100 % | TEMPERATURE: 98.2 F

## 2024-06-24 DIAGNOSIS — R35.0 FREQUENT URINATION: Primary | ICD-10-CM

## 2024-06-24 LAB
ALBUMIN SERPL BCG-MCNC: 4.4 G/DL (ref 3.5–5.2)
ALBUMIN UR-MCNC: NEGATIVE MG/DL
ALP SERPL-CCNC: 48 U/L (ref 40–150)
ALT SERPL W P-5'-P-CCNC: 19 U/L (ref 0–50)
ANION GAP SERPL CALCULATED.3IONS-SCNC: 8 MMOL/L (ref 7–15)
APPEARANCE UR: CLEAR
AST SERPL W P-5'-P-CCNC: 24 U/L (ref 0–45)
BACTERIA #/AREA URNS HPF: ABNORMAL /HPF
BILIRUB SERPL-MCNC: 0.2 MG/DL
BILIRUB UR QL STRIP: NEGATIVE
BUN SERPL-MCNC: 9.5 MG/DL (ref 6–20)
CALCIUM SERPL-MCNC: 8.7 MG/DL (ref 8.6–10)
CHLORIDE SERPL-SCNC: 104 MMOL/L (ref 98–107)
CLUE CELLS: ABNORMAL
COLOR UR AUTO: ABNORMAL
CREAT SERPL-MCNC: 0.71 MG/DL (ref 0.51–0.95)
DEPRECATED HCO3 PLAS-SCNC: 25 MMOL/L (ref 22–29)
EGFRCR SERPLBLD CKD-EPI 2021: >90 ML/MIN/1.73M2
ERYTHROCYTE [DISTWIDTH] IN BLOOD BY AUTOMATED COUNT: 12.4 % (ref 10–15)
GLUCOSE SERPL-MCNC: 92 MG/DL (ref 70–99)
GLUCOSE UR STRIP-MCNC: NEGATIVE MG/DL
HCT VFR BLD AUTO: 35.9 % (ref 35–47)
HGB BLD-MCNC: 12.1 G/DL (ref 11.7–15.7)
HGB UR QL STRIP: ABNORMAL
KETONES UR STRIP-MCNC: NEGATIVE MG/DL
LEUKOCYTE ESTERASE UR QL STRIP: NEGATIVE
MCH RBC QN AUTO: 28.3 PG (ref 26.5–33)
MCHC RBC AUTO-ENTMCNC: 33.7 G/DL (ref 31.5–36.5)
MCV RBC AUTO: 84 FL (ref 78–100)
NITRATE UR QL: NEGATIVE
PH UR STRIP: 7 [PH] (ref 5–7)
PLATELET # BLD AUTO: 232 10E3/UL (ref 150–450)
POTASSIUM SERPL-SCNC: 3.7 MMOL/L (ref 3.4–5.3)
PROT SERPL-MCNC: 7.2 G/DL (ref 6.4–8.3)
RBC # BLD AUTO: 4.28 10E6/UL (ref 3.8–5.2)
RBC #/AREA URNS AUTO: ABNORMAL /HPF
SODIUM SERPL-SCNC: 137 MMOL/L (ref 135–145)
SP GR UR STRIP: 1.01 (ref 1–1.03)
SQUAMOUS #/AREA URNS AUTO: ABNORMAL /LPF
TRICHOMONAS, WET PREP: ABNORMAL
TSH SERPL DL<=0.005 MIU/L-ACNC: 3.21 UIU/ML (ref 0.3–4.2)
UROBILINOGEN UR STRIP-ACNC: 0.2 E.U./DL
VIT D+METAB SERPL-MCNC: 22 NG/ML (ref 20–50)
WBC # BLD AUTO: 7.2 10E3/UL (ref 4–11)
WBC #/AREA URNS AUTO: ABNORMAL /HPF
WBC'S/HIGH POWER FIELD, WET PREP: ABNORMAL
YEAST, WET PREP: ABNORMAL

## 2024-06-24 PROCEDURE — 85027 COMPLETE CBC AUTOMATED: CPT

## 2024-06-24 PROCEDURE — 36415 COLL VENOUS BLD VENIPUNCTURE: CPT

## 2024-06-24 PROCEDURE — 80053 COMPREHEN METABOLIC PANEL: CPT

## 2024-06-24 PROCEDURE — 87210 SMEAR WET MOUNT SALINE/INK: CPT | Mod: QW

## 2024-06-24 PROCEDURE — 82306 VITAMIN D 25 HYDROXY: CPT

## 2024-06-24 PROCEDURE — 81001 URINALYSIS AUTO W/SCOPE: CPT

## 2024-06-24 PROCEDURE — 84443 ASSAY THYROID STIM HORMONE: CPT

## 2024-06-24 PROCEDURE — 99214 OFFICE O/P EST MOD 30 MIN: CPT | Mod: 24

## 2024-06-24 NOTE — PROGRESS NOTES
Assessment & Plan     Frequent urination  Patient in clinic with frequent urination x 2 days.  Patient urinalysis does not indicate infection today.  Patient is not having any pain with urination.  There is no flank tenderness, no history of kidney stones.  She did have a D&C without complications in April approximately 2 months ago.  Patient does have family history of diabetes.  Will do lab workup today to assess for potential cause of this frequent urination.  Patient to be notified of results and plan of care adjusted as needed.  There are no abnormalities on physical exam today.  - UA Macroscopic with reflex to Microscopic and Culture - Clinic Collect  - UA Microscopic with Reflex to Culture  - Vitamin D deficiency screening; Future  - CBC with platelets; Future  - Wet prep - Clinic Collect  - Comprehensive metabolic panel (BMP + Alb, Alk Phos, ALT, AST, Total. Bili, TP); Future  - TSH with free T4 reflex; Future  - Vitamin D deficiency screening  - CBC with platelets  - Comprehensive metabolic panel (BMP + Alb, Alk Phos, ALT, AST, Total. Bili, TP)  - TSH with free T4 reflex          Dickson Joiner is a 33 year old, presenting for the following health issues:  UTI (Frequent urination  x 2 days )        6/24/2024     2:31 PM   Additional Questions   Roomed by CASE Nascimento   Accompanied by , Butch     Via the [employer] Maintenance questionnaire, the patient has reported the following services have been completed -Cervical Cancer Screening: carlo 2023-09-07, this information has been sent to the abstraction team.  History of Present Illness       Reason for visit:  Frequent urination  Symptom onset:  1-3 days ago  Symptom intensity:  Moderate  Symptom progression:  Staying the same  Had these symptoms before:  No    She eats 0-1 servings of fruits and vegetables daily.She consumes 1 sweetened beverage(s) daily.She exercises with enough effort to increase her heart rate 9 or less minutes per day.  She  "exercises with enough effort to increase her heart rate 3 or less days per week.   She is taking medications regularly.                     Objective    /67 (BP Location: Right arm, Patient Position: Sitting, Cuff Size: Adult Regular)   Pulse 76   Temp 98.2  F (36.8  C) (Oral)   Resp 16   Ht 1.499 m (4' 11\")   Wt 53 kg (116 lb 12.8 oz)   LMP 05/23/2024   SpO2 100%   BMI 23.59 kg/m    Body mass index is 23.59 kg/m .  Physical Exam  HENT:      Head: Normocephalic.      Mouth/Throat:      Mouth: Mucous membranes are moist.      Pharynx: Oropharynx is clear.   Eyes:      Extraocular Movements: Extraocular movements intact.      Conjunctiva/sclera: Conjunctivae normal.   Cardiovascular:      Rate and Rhythm: Normal rate and regular rhythm.   Pulmonary:      Effort: Pulmonary effort is normal.      Breath sounds: No wheezing, rhonchi or rales.   Abdominal:      General: Bowel sounds are normal. There is no distension.      Palpations: Abdomen is soft.      Tenderness: There is no abdominal tenderness. There is no right CVA tenderness or left CVA tenderness.   Musculoskeletal:      Cervical back: Neck supple.      Right lower leg: No edema.      Left lower leg: No edema.   Lymphadenopathy:      Cervical: No cervical adenopathy.   Skin:     General: Skin is warm and dry.      Capillary Refill: Capillary refill takes less than 2 seconds.   Neurological:      Mental Status: She is alert and oriented to person, place, and time.   Psychiatric:         Behavior: Behavior normal.         Thought Content: Thought content normal.                    Signed Electronically by: GOLDY Hernandez CNP    "

## 2024-06-25 ENCOUNTER — LAB (OUTPATIENT)
Dept: LAB | Facility: CLINIC | Age: 34
End: 2024-06-25
Payer: COMMERCIAL

## 2024-06-25 DIAGNOSIS — R35.0 FREQUENT URINATION: Primary | ICD-10-CM

## 2024-06-25 DIAGNOSIS — O02.0 MOLAR PREGNANCY: ICD-10-CM

## 2024-06-25 PROCEDURE — 36415 COLL VENOUS BLD VENIPUNCTURE: CPT

## 2024-06-25 PROCEDURE — 84702 CHORIONIC GONADOTROPIN TEST: CPT

## 2024-06-26 ENCOUNTER — HOSPITAL ENCOUNTER (OUTPATIENT)
Dept: ULTRASOUND IMAGING | Facility: CLINIC | Age: 34
Discharge: HOME | End: 2024-06-26
Payer: COMMERCIAL

## 2024-06-26 ENCOUNTER — TELEPHONE (OUTPATIENT)
Dept: FAMILY MEDICINE | Facility: CLINIC | Age: 34
End: 2024-06-26
Payer: COMMERCIAL

## 2024-06-26 DIAGNOSIS — R35.0 FREQUENT URINATION: ICD-10-CM

## 2024-06-26 LAB — HCG INTACT+B SERPL-ACNC: <1 MIU/ML

## 2024-06-26 PROCEDURE — 76770 US EXAM ABDO BACK WALL COMP: CPT

## 2024-06-26 NOTE — TELEPHONE ENCOUNTER
Lou calling with questions about US order and diagnosis. Order was abdomen limited; if frequent urination, this US will not look at both kidneys and bladder. Discussed with provider and new order was placed. Call with Lou and informed new order has been placed and order was confirmed as correct.

## 2024-09-18 ENCOUNTER — LAB (OUTPATIENT)
Dept: LAB | Facility: CLINIC | Age: 34
End: 2024-09-18
Payer: COMMERCIAL

## 2024-09-18 DIAGNOSIS — O02.0 MOLAR PREGNANCY: ICD-10-CM

## 2024-09-18 PROCEDURE — 84702 CHORIONIC GONADOTROPIN TEST: CPT

## 2024-09-18 PROCEDURE — 36415 COLL VENOUS BLD VENIPUNCTURE: CPT

## 2024-09-19 LAB — HCG INTACT+B SERPL-ACNC: <1 MIU/ML

## 2024-09-30 ENCOUNTER — OFFICE VISIT (OUTPATIENT)
Dept: OBGYN | Facility: CLINIC | Age: 34
End: 2024-09-30
Payer: COMMERCIAL

## 2024-09-30 VITALS
BODY MASS INDEX: 22.78 KG/M2 | HEIGHT: 59 IN | SYSTOLIC BLOOD PRESSURE: 90 MMHG | WEIGHT: 113 LBS | DIASTOLIC BLOOD PRESSURE: 60 MMHG

## 2024-09-30 DIAGNOSIS — Z87.59 HISTORY OF MOLAR PREGNANCY: Primary | ICD-10-CM

## 2024-09-30 DIAGNOSIS — E28.2 PCOS (POLYCYSTIC OVARIAN SYNDROME): ICD-10-CM

## 2024-09-30 PROCEDURE — G2211 COMPLEX E/M VISIT ADD ON: HCPCS | Performed by: OBSTETRICS & GYNECOLOGY

## 2024-09-30 PROCEDURE — 99214 OFFICE O/P EST MOD 30 MIN: CPT | Performed by: OBSTETRICS & GYNECOLOGY

## 2024-09-30 RX ORDER — VITAMIN A, VITAMIN C, VITAMIN D-3, VITAMIN E, VITAMIN B-1, VITAMIN B-2, NIACIN, VITAMIN B-6, CALCIUM, IRON, ZINC, COPPER 4000; 120; 400; 22; 1.84; 3; 20; 10; 1; 12; 200; 27; 25; 2 [IU]/1; MG/1; [IU]/1; MG/1; MG/1; MG/1; MG/1; MG/1; MG/1; UG/1; MG/1; MG/1; MG/1; MG/1
TABLET ORAL DAILY
COMMUNITY

## 2024-09-30 NOTE — PROGRESS NOTES
"  SUBJECTIVE:                                                   CC:  Patient presents with:  history of molar:       HPI:  Marisel Perry is a 34 year old  who preesnts to discuss preconception planning.  History of molar pregnancy in 2024, wants to be sure she is OK to TTC now.  She is here today with her .  Their daughter is 4 years old and is asking for a sibling.     Underwent D&C and had HCGs followed to negative.      2019: SAB 8wks  2020: VAVD at 38+0  2024: complete molar pregnancy, s/p D&C    Gyn History:  Patient's last menstrual period was 2024 (exact date).       PMH, PSH, Soc Hx, Fam Hx, Meds, and allergies reviewed in Epic.    OBJECTIVE:     BP 90/60   Ht 1.499 m (4' 11\")   Wt 51.3 kg (113 lb)   LMP 2024 (Exact Date)   BMI 22.82 kg/m      Gen: Healthy appearing thin female, no acute distress, comfortable  Psych: mentation appears normal and affect bright    Test Results:  Component      Latest Ref Rng 2024  10:46 AM 2024  11:46 AM 2024  12:59 PM 2024  12:55 PM 2024  12:53 PM 2024  1:01 PM   hCG Quantitative      <5 mIU/mL 68,526 (H)  222 (H)  46 (H)  16 (H)  6 (H)  3      Component      Latest Ref Rng 2024  12:50 PM 2024  11:31 AM   hCG Quantitative      <5 mIU/mL <1  <1       Legend:  (H) High    A.  Designated \"products of conception\":  -Complete hydatidiform mole, see comment.    ASSESSMENT/PLAN:                                                      1. History of molar pregnancy  Would recommend early serial beta hCGs and a 6 to 7-week transvaginal ultrasound in a future pregnancy.  After delivery plan to send placenta to pathology for histology, and measure HCG six weeks postpartum in any type of future pregnancy.      2. History of PCOS (polycystic ovarian syndrome) and history of female infertility  She also wishes to check on her ovaries and make sure they are normal.  Discussed that this is not necessary " prior to TTC unless cycles are irregular, however her sister is a gynecologist in [place] and recommended that she have a progesterone checked and an ultrasound to look at her ovaries.   - US Pelvic Transabdominal and Transvaginal; Future  - Progesterone; Future         Angeline Pratt MD, MPH  Obstetrics and Gynecology

## 2024-10-02 ENCOUNTER — OFFICE VISIT (OUTPATIENT)
Dept: FAMILY MEDICINE | Facility: CLINIC | Age: 34
End: 2024-10-02
Payer: COMMERCIAL

## 2024-10-02 VITALS
OXYGEN SATURATION: 100 % | SYSTOLIC BLOOD PRESSURE: 98 MMHG | HEIGHT: 59 IN | DIASTOLIC BLOOD PRESSURE: 65 MMHG | TEMPERATURE: 97.8 F | BODY MASS INDEX: 23.06 KG/M2 | WEIGHT: 114.4 LBS | HEART RATE: 70 BPM | RESPIRATION RATE: 16 BRPM

## 2024-10-02 DIAGNOSIS — Z00.00 ENCOUNTER FOR PREVENTATIVE ADULT HEALTH CARE EXAMINATION: Primary | ICD-10-CM

## 2024-10-02 PROCEDURE — 99395 PREV VISIT EST AGE 18-39: CPT

## 2024-10-02 SDOH — HEALTH STABILITY: PHYSICAL HEALTH: ON AVERAGE, HOW MANY DAYS PER WEEK DO YOU ENGAGE IN MODERATE TO STRENUOUS EXERCISE (LIKE A BRISK WALK)?: 0 DAYS

## 2024-10-02 ASSESSMENT — SOCIAL DETERMINANTS OF HEALTH (SDOH): HOW OFTEN DO YOU GET TOGETHER WITH FRIENDS OR RELATIVES?: ONCE A WEEK

## 2024-10-02 NOTE — PROGRESS NOTES
Preventive Care Visit  [facility]  Lauryn ChengGOLDY mac CNP, Family Medicine  Oct 2, 2024      Assessment & Plan     Encounter for preventative adult health care examination  Without abnormal findings.  Shortened exam due to patient needing to leave for child to .  Breast exam done at request of patient and no abnormal lumps or masses palpated.  Patient has had recent molar pregnancy and is working with obstetrician to again conceive.  Patient has requested labs that she would like drawn today.  - CBC with platelets; Future  - Vitamin D deficiency screening; Future  - TSH with free T4 reflex; Future  - Basic metabolic panel  (Ca, Cl, CO2, Creat, Gluc, K, Na, BUN); Future  - Iron and iron binding capacity; Future          Counseling  Appropriate preventive services were addressed with this patient via screening, questionnaire, or discussion as appropriate for fall prevention, nutrition, physical activity, Tobacco-use cessation, social engagement, weight loss and cognition.  Checklist reviewing preventive services available has been given to the patient.  Reviewed patient's diet, addressing concerns and/or questions.   She is at risk for psychosocial distress and has been provided with information to reduce risk.         Breast exam done today.   Subjective   Marisel is a 34 year old, presenting for the following:  Physical        10/2/2024     2:40 PM   Additional Questions   Roomed by Elena POLK   Accompanied by Self        Health Care Directive  Patient does not have a Health Care Directive or Living Will: Discussed advance care planning with patient; however, patient declined at this time.    HPI              10/2/2024   General Health   How would you rate your overall physical health? Good   Feel stress (tense, anxious, or unable to sleep) Only a little      (!) STRESS CONCERN      10/2/2024   Nutrition   Three or more servings of calcium each day? Yes   Diet: Regular (no restrictions)    How many servings of fruit and vegetables per day? (!) 0-1   How many sweetened beverages each day? 0-1            10/2/2024   Exercise   Days per week of moderate/strenous exercise 0 days      (!) EXERCISE CONCERN      10/2/2024   Social Factors   Frequency of gathering with friends or relatives Once a week   Worry food won't last until get money to buy more No   Food not last or not have enough money for food? No   Do you have housing? (Housing is defined as stable permanent housing and does not include staying ouside in a car, in a tent, in an abandoned building, in an overnight shelter, or couch-surfing.) Yes   Are you worried about losing your housing? No   Lack of transportation? No   Unable to get utilities (heat,electricity)? No            10/2/2024   Dental   Dentist two times every year? Yes            10/2/2024   TB Screening   Were you born outside of the [place]? Yes              Today's PHQ-2 Score:       4/5/2024     8:52 AM   PHQ-2 ( 1999 [employer])   Q1: Little interest or pleasure in doing things 3   Q2: Feeling down, depressed or hopeless 0   PHQ-2 Score 3   Q1: Little interest or pleasure in doing things Nearly every day   Q2: Feeling down, depressed or hopeless Not at all   PHQ-2 Score 3         10/2/2024   Substance Use   Alcohol more than 3/day or more than 7/wk Not Applicable   Do you use any other substances recreationally? No        Social History     Tobacco Use    Smoking status: Never     Passive exposure: Never    Smokeless tobacco: Never   Vaping Use    Vaping status: Never Used   Substance Use Topics    Alcohol use: No    Drug use: No                  10/2/2024   STI Screening   New sexual partner(s) since last STI/HIV test? No        History of abnormal Pap smear: No - age 30-64 HPV with reflex Pap every 5 years recommended             10/2/2024   Contraception/Family Planning   Questions about contraception or family planning (!) YES labs today, patient seeing OB           Reviewed and  "updated as needed this visit by Provider                             Objective    Exam  BP 98/65 (BP Location: Right arm, Patient Position: Sitting, Cuff Size: Adult Regular)   Pulse 70   Temp 97.8  F (36.6  C)   Resp 16   Ht 1.499 m (4' 11\")   Wt 51.9 kg (114 lb 6.4 oz)   LMP 09/26/2024 (Exact Date)   SpO2 100%   BMI 23.11 kg/m     Estimated body mass index is 23.11 kg/m  as calculated from the following:    Height as of this encounter: 1.499 m (4' 11\").    Weight as of this encounter: 51.9 kg (114 lb 6.4 oz).    Physical Exam  GENERAL: alert and no distress  RESP: lungs clear to auscultation - no rales, rhonchi or wheezes  CV: regular rate and rhythm, normal S1 S2, no S3 or S4, no murmur, click or rub, no peripheral edema  MS: no gross musculoskeletal defects noted, no edema        Signed Electronically by: GOLDY Hernandez CNP    "

## 2024-10-03 ENCOUNTER — LAB (OUTPATIENT)
Dept: LAB | Facility: CLINIC | Age: 34
End: 2024-10-03
Payer: COMMERCIAL

## 2024-10-03 DIAGNOSIS — Z00.00 ENCOUNTER FOR PREVENTATIVE ADULT HEALTH CARE EXAMINATION: ICD-10-CM

## 2024-10-03 LAB
ERYTHROCYTE [DISTWIDTH] IN BLOOD BY AUTOMATED COUNT: 12.9 % (ref 10–15)
HCT VFR BLD AUTO: 35.3 % (ref 35–47)
HGB BLD-MCNC: 12.3 G/DL (ref 11.7–15.7)
MCH RBC QN AUTO: 28.8 PG (ref 26.5–33)
MCHC RBC AUTO-ENTMCNC: 34.8 G/DL (ref 31.5–36.5)
MCV RBC AUTO: 83 FL (ref 78–100)
PLATELET # BLD AUTO: 224 10E3/UL (ref 150–450)
RBC # BLD AUTO: 4.27 10E6/UL (ref 3.8–5.2)
WBC # BLD AUTO: 6.7 10E3/UL (ref 4–11)

## 2024-10-03 PROCEDURE — 80048 BASIC METABOLIC PNL TOTAL CA: CPT

## 2024-10-03 PROCEDURE — 83550 IRON BINDING TEST: CPT

## 2024-10-03 PROCEDURE — 82306 VITAMIN D 25 HYDROXY: CPT

## 2024-10-03 PROCEDURE — 85027 COMPLETE CBC AUTOMATED: CPT

## 2024-10-03 PROCEDURE — 83540 ASSAY OF IRON: CPT

## 2024-10-03 PROCEDURE — 36415 COLL VENOUS BLD VENIPUNCTURE: CPT

## 2024-10-03 PROCEDURE — 84443 ASSAY THYROID STIM HORMONE: CPT

## 2024-10-04 LAB
ANION GAP SERPL CALCULATED.3IONS-SCNC: 8 MMOL/L (ref 7–15)
BUN SERPL-MCNC: 9.1 MG/DL (ref 6–20)
CALCIUM SERPL-MCNC: 8.9 MG/DL (ref 8.8–10.4)
CHLORIDE SERPL-SCNC: 105 MMOL/L (ref 98–107)
CREAT SERPL-MCNC: 0.79 MG/DL (ref 0.51–0.95)
EGFRCR SERPLBLD CKD-EPI 2021: >90 ML/MIN/1.73M2
GLUCOSE SERPL-MCNC: 73 MG/DL (ref 70–99)
HCO3 SERPL-SCNC: 25 MMOL/L (ref 22–29)
IRON BINDING CAPACITY (ROCHE): 325 UG/DL (ref 240–430)
IRON SATN MFR SERPL: 22 % (ref 15–46)
IRON SERPL-MCNC: 72 UG/DL (ref 37–145)
POTASSIUM SERPL-SCNC: 3.7 MMOL/L (ref 3.4–5.3)
SODIUM SERPL-SCNC: 138 MMOL/L (ref 135–145)
TSH SERPL DL<=0.005 MIU/L-ACNC: 2.66 UIU/ML (ref 0.3–4.2)
VIT D+METAB SERPL-MCNC: 17 NG/ML (ref 20–50)

## 2024-10-21 ENCOUNTER — DOCUMENTATION ONLY (OUTPATIENT)
Dept: OBGYN | Facility: CLINIC | Age: 34
End: 2024-10-21
Payer: COMMERCIAL

## 2024-10-21 DIAGNOSIS — Z87.59 HISTORY OF MOLAR PREGNANCY: Primary | ICD-10-CM

## 2024-10-21 NOTE — PROGRESS NOTES
"Patient has lab only appt 10/24/24 for \"Progesterone and hcg\", only order for progesterone in chart has an expected date of 11/30/24.   We cannot draw that test more than two weeks ahead of that date.  If you want this test done sooner, please change the expected date or place new FUTURE order in Epic if appropriate.    Thanks,   [facility]    "

## 2024-10-21 NOTE — PROGRESS NOTES
Angeline Pratt MD  [facility] [id number] minutes ago (8:36 AM)       Ok to order lab

## 2024-10-23 ENCOUNTER — LAB (OUTPATIENT)
Dept: LAB | Facility: CLINIC | Age: 34
End: 2024-10-23
Payer: COMMERCIAL

## 2024-10-23 DIAGNOSIS — Z87.59 HISTORY OF MOLAR PREGNANCY: ICD-10-CM

## 2024-10-23 LAB
HCG INTACT+B SERPL-ACNC: <1 MIU/ML
PROGEST SERPL-MCNC: 0.7 NG/ML

## 2024-10-23 PROCEDURE — 84144 ASSAY OF PROGESTERONE: CPT

## 2024-10-23 PROCEDURE — 36415 COLL VENOUS BLD VENIPUNCTURE: CPT

## 2024-10-23 PROCEDURE — 84702 CHORIONIC GONADOTROPIN TEST: CPT

## 2024-11-12 ENCOUNTER — TELEPHONE (OUTPATIENT)
Dept: OBGYN | Facility: CLINIC | Age: 34
End: 2024-11-12
Payer: COMMERCIAL

## 2024-11-12 DIAGNOSIS — N94.6 PAINFUL MENSTRUAL PERIODS: Primary | ICD-10-CM

## 2024-11-12 DIAGNOSIS — E28.2 PCOS (POLYCYSTIC OVARIAN SYNDROME): ICD-10-CM

## 2024-11-12 NOTE — TELEPHONE ENCOUNTER
This doesn't sound like something that needs an appt, but I understand she may want to talk about it anyway, and at least we have an open slot.  I agree with taking ibuprofen.  I'm OK with having her do an ultrasound prior, it would at least give us something to talk about.

## 2024-11-12 NOTE — TELEPHONE ENCOUNTER
Spoke to pt, she wants to keep appt and get U/s. I did advise MAL didn't necessarily think an appt was needed.     Pt is concerned that her periods are changing.    Alyssa SOUZA RN BSN  [facility]

## 2024-11-12 NOTE — TELEPHONE ENCOUNTER
Pt calls with abnormally long period.    She had molar period on April.    She reports no period for a couple months after molar. Last period was Oct 23rd as expected but it was painful.  She is now bleeding again starting today and it is heavy and painful.    She is not saturating a pad in less than an hour.   Ovulation has been getting tracked with Dr Pratt.    She wants to understand why her period is so painful and heavy.  She is not on any meds besides PNV and vit D.  She has not taken anything for pain so I advised 600-800mg ibuprofen and heat pack.    I scheduled her for an appt on Friday, any U/s prior?    Alyssa SOUZA RN BSN  [facility]

## 2024-11-14 ENCOUNTER — ANCILLARY PROCEDURE (OUTPATIENT)
Dept: ULTRASOUND IMAGING | Facility: CLINIC | Age: 34
End: 2024-11-14
Payer: COMMERCIAL

## 2024-11-14 DIAGNOSIS — N94.6 PAINFUL MENSTRUAL PERIODS: ICD-10-CM

## 2024-11-14 DIAGNOSIS — E28.2 PCOS (POLYCYSTIC OVARIAN SYNDROME): ICD-10-CM

## 2024-11-14 PROCEDURE — 76856 US EXAM PELVIC COMPLETE: CPT | Performed by: OBSTETRICS & GYNECOLOGY

## 2024-11-14 PROCEDURE — 76830 TRANSVAGINAL US NON-OB: CPT | Performed by: OBSTETRICS & GYNECOLOGY

## 2024-11-15 ENCOUNTER — OFFICE VISIT (OUTPATIENT)
Dept: OBGYN | Facility: CLINIC | Age: 34
End: 2024-11-15
Payer: COMMERCIAL

## 2024-11-15 VITALS
DIASTOLIC BLOOD PRESSURE: 60 MMHG | WEIGHT: 114 LBS | BODY MASS INDEX: 22.98 KG/M2 | SYSTOLIC BLOOD PRESSURE: 80 MMHG | HEIGHT: 59 IN

## 2024-11-15 DIAGNOSIS — N97.9 FEMALE INFERTILITY: Primary | ICD-10-CM

## 2024-11-15 PROCEDURE — 84146 ASSAY OF PROLACTIN: CPT | Performed by: OBSTETRICS & GYNECOLOGY

## 2024-11-15 PROCEDURE — 36415 COLL VENOUS BLD VENIPUNCTURE: CPT | Performed by: OBSTETRICS & GYNECOLOGY

## 2024-11-15 PROCEDURE — 82166 ASSAY ANTI-MULLERIAN HORM: CPT | Performed by: OBSTETRICS & GYNECOLOGY

## 2024-11-15 PROCEDURE — 83001 ASSAY OF GONADOTROPIN (FSH): CPT | Performed by: OBSTETRICS & GYNECOLOGY

## 2024-11-15 PROCEDURE — 83002 ASSAY OF GONADOTROPIN (LH): CPT | Performed by: OBSTETRICS & GYNECOLOGY

## 2024-11-15 PROCEDURE — 82627 DEHYDROEPIANDROSTERONE: CPT | Performed by: OBSTETRICS & GYNECOLOGY

## 2024-11-15 PROCEDURE — 99214 OFFICE O/P EST MOD 30 MIN: CPT | Performed by: OBSTETRICS & GYNECOLOGY

## 2024-11-15 PROCEDURE — 82670 ASSAY OF TOTAL ESTRADIOL: CPT | Performed by: OBSTETRICS & GYNECOLOGY

## 2024-11-15 PROCEDURE — 84443 ASSAY THYROID STIM HORMONE: CPT | Performed by: OBSTETRICS & GYNECOLOGY

## 2024-11-15 PROCEDURE — G2211 COMPLEX E/M VISIT ADD ON: HCPCS | Performed by: OBSTETRICS & GYNECOLOGY

## 2024-11-15 RX ORDER — PNV NO.95/FERROUS FUM/FOLIC AC 28MG-0.8MG
1 TABLET ORAL DAILY
Qty: 90 TABLET | Refills: 3 | Status: SHIPPED | OUTPATIENT
Start: 2024-11-15

## 2024-11-15 RX ORDER — LETROZOLE 2.5 MG/1
2.5 TABLET, FILM COATED ORAL DAILY
Qty: 5 TABLET | Refills: 1 | Status: SHIPPED | OUTPATIENT
Start: 2024-11-15 | End: 2024-11-20

## 2024-11-15 RX ORDER — FAMOTIDINE 20 MG
TABLET ORAL
COMMUNITY

## 2024-11-15 NOTE — PATIENT INSTRUCTIONS
In order to further evaluate for infertility, you will need the following tests: Keep these instructions so you are able to inform the lab of which tests you are needing at each lab visit.     Cycle Day 1 is the first day of your period.  Count each day forward to calculate which Cycle Day you are on.     -- on cycle day 6, 7, 8 or 9, you need to have a Hysterosalpingogram (HSG); this is a test done to check your uterus and tubes.  Call to schedule on the first day of your period.      -- your partner needs to obtain and submit a semen sample for Semen Analysis (best if 3 days of no ejaculation prior, and keep the specimen warm until it arrives at the specialized lab), we will be giving you a separate request for this test     -- you should be taking a vitamin with Folic Acid to prevent birth defects.

## 2024-11-15 NOTE — PROGRESS NOTES
"  SUBJECTIVE:                                                   CC:  Patient presents with:  Abnormal Bleeding Problem: Painful and heavy, still trying to have another baby      HPI:  Marisel Perry is a 34 year old  who presents to discuss infertility/trying to conceive.    She usually ovulates on cycle day 16.  Has tried OPK's in the past, these last 2 cycles were a little bit strange, she had 1 cycle that was 21 days and another that was 27.  She is currently on her fourth day of bleeding.  She strongly desires to do some fertility testing.    Of note, she is a known patient, has a recent history of molar pregnancy earlier this year, see my last note and discussion of her prior pregnancy.    Gyn History:  Patient's last menstrual period was 2024 (exact date).       Using none for contraception.    Last 3 Pap and HPV Results:      PMH, PSH, Soc Hx, Fam Hx, Meds, and allergies reviewed in Epic.    OBJECTIVE:     BP (!) 80/60 (BP Location: Right arm, Patient Position: Chair, Cuff Size: Adult Regular)   Ht 1.499 m (4' 11\")   Wt 51.7 kg (114 lb)   LMP 2024 (Exact Date)   Breastfeeding No   BMI 23.03 kg/m      Gen: Healthy appearing thin female, no acute distress, comfortable  Psych: mentation appears normal and affect bright     Test Results:  [facility] [place]  ULTRASOUND - PELVIC GYN- Transabdominal and Transvaginal     Referring MD: Angeline Pratt MD     ===================================     CLINICAL INFORMATION     Indications for ultrasound:   Bleeding/Menses - Metrorrhagia (irregular menses) and Pain - Pelvic pain     LMP: 2024    Hormones: none     Measurements:  Uterus:  6.9 x 4.9 x 4.1  cm     Position is anteverted.  Contour is smooth/regular.     Endo cav: 5.5 mm       Smooth/regular/wnl     Right ovary: 2.9 x 3.1 x 1.7  cm  Wnl  Left ovary:   3.0 x 2.8 x 1.5 cm Wnl     Cul de sac: no free fluid     Technique: Transvaginal Imaging performed  Transabdominal " Imaging performed     ===================================  Complete pelvic ultrasound using realtime   transabdominal and transvaginal scanning.     Bladder appears normal.     Normal uterus and bilateral adnexa.  Normal pelvic ultrasound study.    ASSESSMENT/PLAN:                                                      1. Female infertility (Primary)  Extensive discussion of physiology of pregnancy and different factors for infertility.  She is low risk of ovulation factor, low risk male factor, low risk tubal factor, low risk uterine factor.  The work up for each of these was outlined in detail, including the possibilities to do step-wise testing starting with more likely factors, moving to the more invasive testing that is lower yield and possibly not covered by insurance.  Discussed that infertility of unknown etiology can be treated here with clomid (or femara), clomid (or femara) +IUI, or referral to infertility specialist.  The following tests will be ordered today and she will follow up as needed.   She desires ovulation induction with letrozole.  Discussed risk of multiple gestations, states understanding.  Also recommend SA.   - Mullerian Hormone Antibody; Future  - DHEA sulfate; Future  - Estradiol; Future  - Follicle stimulating hormone; Future  - letrozole (FEMARA) 2.5 MG tablet; Take 1 tablet (2.5 mg) by mouth daily for 5 days. On cycle day 3-7.  DO NOT take if pregnant.  Recommend progesterone lab test one week after ovulation to confirm dosage.  Dispense: 5 tablet; Refill: 1  - Prolactin; Future  - TSH with free T4 reflex; Future  - Progesterone; Future  - Luteinizing Hormone; Future  - XR Hysterosalpingogram; Future    Patient Instructions   In order to further evaluate for infertility, you will need the following tests: Keep these instructions so you are able to inform the lab of which tests you are needing at each lab visit.     Cycle Day 1 is the first day of your period.  Count each day forward to  calculate which Cycle Day you are on.     -- on cycle day 6, 7, 8 or 9, you need to have a Hysterosalpingogram (HSG); this is a test done to check your uterus and tubes.  Call to schedule on the first day of your period.      -- your partner needs to obtain and submit a semen sample for Semen Analysis (best if 3 days of no ejaculation prior, and keep the specimen warm until it arrives at the specialized lab), we will be giving you a separate request for this test     -- you should be taking a vitamin with Folic Acid to prevent birth defects.        Angeline Pratt MD, MPH  Obstetrics and Gynecology

## 2024-11-15 NOTE — RESULT ENCOUNTER NOTE
Results discussed directly with patient while patient was present during in person visit. Any further details documented in the note.   Angeline Pratt MD

## 2024-11-15 NOTE — NURSING NOTE
"Chief Complaint   Patient presents with    Abnormal Bleeding Problem     Painful and heavy, still trying to have another baby       Initial BP (!) 80/60 (BP Location: Right arm, Patient Position: Chair, Cuff Size: Adult Regular)   Ht 1.499 m (4' 11\")   Wt 51.7 kg (114 lb)   LMP 2024 (Exact Date)   Breastfeeding No   BMI 23.03 kg/m   Estimated body mass index is 23.03 kg/m  as calculated from the following:    Height as of this encounter: 1.499 m (4' 11\").    Weight as of this encounter: 51.7 kg (114 lb).  BP completed using cuff size: regular    Questioned patient about current smoking habits.  Pt. has never smoked.          The following HM Due: NONE  Rashmi Forrest CMA           "

## 2024-11-16 LAB
ESTRADIOL SERPL-MCNC: 22 PG/ML
FSH SERPL IRP2-ACNC: 6.8 MIU/ML
LH SERPL-ACNC: 6.7 MIU/ML
MIS SERPL-MCNC: 3.16 NG/ML (ref 0.58–8.1)
PROLACTIN SERPL 3RD IS-MCNC: 7 NG/ML (ref 5–23)
TSH SERPL DL<=0.005 MIU/L-ACNC: 3.45 UIU/ML (ref 0.3–4.2)

## 2024-11-18 LAB — DHEA-S SERPL-MCNC: 201 UG/DL (ref 35–430)

## 2025-01-02 ENCOUNTER — OFFICE VISIT (OUTPATIENT)
Dept: FAMILY MEDICINE | Facility: CLINIC | Age: 35
End: 2025-01-02
Payer: COMMERCIAL

## 2025-01-02 ENCOUNTER — ANCILLARY PROCEDURE (OUTPATIENT)
Dept: GENERAL RADIOLOGY | Facility: CLINIC | Age: 35
End: 2025-01-02
Attending: INTERNAL MEDICINE
Payer: COMMERCIAL

## 2025-01-02 VITALS
OXYGEN SATURATION: 98 % | HEIGHT: 59 IN | RESPIRATION RATE: 14 BRPM | DIASTOLIC BLOOD PRESSURE: 69 MMHG | WEIGHT: 111.9 LBS | SYSTOLIC BLOOD PRESSURE: 102 MMHG | BODY MASS INDEX: 22.56 KG/M2 | TEMPERATURE: 98.3 F | HEART RATE: 71 BPM

## 2025-01-02 DIAGNOSIS — R05.8 PRODUCTIVE COUGH: ICD-10-CM

## 2025-01-02 DIAGNOSIS — J20.9 ACUTE BRONCHITIS WITH SYMPTOMS > 10 DAYS: Primary | ICD-10-CM

## 2025-01-02 DIAGNOSIS — J02.9 SORE THROAT: ICD-10-CM

## 2025-01-02 LAB
DEPRECATED S PYO AG THROAT QL EIA: NEGATIVE
FLUAV AG SPEC QL IA: NEGATIVE
FLUBV AG SPEC QL IA: NEGATIVE
S PYO DNA THROAT QL NAA+PROBE: NOT DETECTED

## 2025-01-02 RX ORDER — PREDNISONE 20 MG/1
40 TABLET ORAL DAILY
Qty: 10 TABLET | Refills: 0 | Status: SHIPPED | OUTPATIENT
Start: 2025-01-02

## 2025-01-02 ASSESSMENT — ENCOUNTER SYMPTOMS
SORE THROAT: 1
COUGH: 1

## 2025-01-02 ASSESSMENT — PAIN SCALES - GENERAL: PAINLEVEL_OUTOF10: MILD PAIN (3)

## 2025-01-02 NOTE — PROGRESS NOTES
Assessment and Plan  1. Acute bronchitis with symptoms > 10 days (Primary)  Acute concerns of ongoing cough for more than 2 weeks which patient endorses as mentioned in HPI below.  Physical exam positive for decreased breath sounds bilaterally with mild post tussive rales but ENT exam was normal.  Will consider checking X ray chest for possible pneumonia given the fever which she endorses and give emperic antibiotic and prednisone to treat this as Acute bronchitis with the duration more than 10 days.   - amoxicillin-clavulanate (AUGMENTIN) 875-125 MG tablet; Take 1 tablet by mouth 2 times daily.  Dispense: 14 tablet; Refill: 0  - predniSONE (DELTASONE) 20 MG tablet; Take 2 tablets (40 mg) by mouth daily.  Dispense: 10 tablet; Refill: 0    2. Sore throat  - Streptococcus A Rapid Screen w/Reflex to PCR - Clinic Collect  - Influenza A & B Antigen - Clinic Collect  - Group A Streptococcus PCR Throat Swab    3. Productive cough  - XR Chest 2 Views; Future         Please note that this note consists of symbols derived from keyboarding, dictation and/or voice recognition software. As a result, there may be errors in the script that have gone undetected. Please consider this when interpreting information found in this chart.    Patient Instructions   As discussed, since you are already out of the window period for your viral infection - will need to treat this secondary bacterial infection     Sent in necessary medications to your pharmacy     Will check X ray to make sure no pneumonia    ==================================          Return in about 10 days (around 1/12/2025), or if symptoms worsen or fail to improve, for Follow up of last visit, If symptoms persist.    Natasha Mckeon MD  [facility] MARIAM Joiner is a 34 year old, presenting for the following health issues:  Cough (Two weeks, cough and sore throat, tried mucinex, mild relief. ) and Pharyngitis        1/2/2025    10:56  AM   Additional Questions   Roomed by Rowan SALCEDO     History of Present Illness       Reason for visit:  Cold cough  Symptom onset:  1-2 weeks ago   She is taking medications regularly.       Acute Illness  Acute illness concerns: Cough, sore throat, and congestion  Onset/Duration: 2 weeks  Symptoms:  Fever: YES  Chills/Sweats: YES- chillds  Headache (location?): No  Sinus Pressure: No  Conjunctivitis:  No  Ear Pain: no  Rhinorrhea: YES  Congestion: YES  Sore Throat: YES  Cough: YES-non-productive  Wheeze: No  Decreased Appetite: YES  Nausea: No  Vomiting: No  Diarrhea: No  Dysuria/Freq.: No  Dysuria or Hematuria: No  Fatigue/Achiness: YES  Sick/Strep Exposure: YES- kid goes to school  Therapies tried and outcome: OTC cough tablets and mucinex, mild relief       No Known Allergies     Past Medical History:   Diagnosis Date    History of blood transfusion     as a child    Hypothyroidism     Primary female infertility     Vacuum extraction, delivered, current hospitalization 08/05/2020    Varicella        Past Surgical History:   Procedure Laterality Date    DILATION AND CURETTAGE SUCTION N/A 4/10/2024    Procedure: DILATION AND CURETTAGE, UTERUS, USING SUCTION with ultrasound, repair of vaginal laceration;  Surgeon: Miladis Fisher MD;  Location:  OR    ESOPHAGOSCOPY, GASTROSCOPY, DUODENOSCOPY (EGD), COMBINED N/A 11/20/2019    Procedure: ESOPHAGOGASTRODUODENOSCOPY, WITH BIOPSY;  Surgeon: Evan Aaron MD;  Location:  GI       Family History   Problem Relation Age of Onset    Hypertension Mother     Diabetes Type 2  Father        Social History     Tobacco Use    Smoking status: Never     Passive exposure: Never    Smokeless tobacco: Never   Substance Use Topics    Alcohol use: No        Current Outpatient Medications   Medication Sig Dispense Refill    amoxicillin-clavulanate (AUGMENTIN) 875-125 MG tablet Take 1 tablet by mouth 2 times daily. 14 tablet 0    predniSONE (DELTASONE) 20 MG tablet Take 2  "tablets (40 mg) by mouth daily. 10 tablet 0    letrozole (FEMARA) 2.5 MG tablet Take 1 tablet (2.5 mg) by mouth daily for 5 days. On cycle day 3-7.  DO NOT take if pregnant.  Recommend progesterone lab test one week after ovulation to confirm dosage. 5 tablet 1    Prenatal Vit-Fe Fumarate-FA (PRENATAL MULTIVITAMIN  PLUS IRON) 27-1 MG TABS Take by mouth daily. (Patient not taking: Reported on 1/2/2025)      Prenatal Vit-Fe Fumarate-FA (PRENATAL VITAMIN) 27-0.8 MG TABS Take 1 each by mouth daily. (Patient not taking: Reported on 1/2/2025) 90 tablet 3    Vitamin D, Cholecalciferol, 25 MCG (1000 UT) CAPS Take by mouth. (Patient not taking: Reported on 1/2/2025)       No current facility-administered medications for this visit.          Review of Systems  Constitutional, HEENT, cardiovascular, pulmonary, GI, , musculoskeletal, neuro, skin, endocrine and psych systems are negative, except as otherwise noted.      Objective    /69   Pulse 71   Temp 98.3  F (36.8  C) (Tympanic)   Resp 14   Ht 1.5 m (4' 11.06\")   Wt 50.8 kg (111 lb 14.4 oz)   LMP 12/17/2024 (Exact Date)   SpO2 98%   BMI 22.56 kg/m    Body mass index is 22.56 kg/m .  Physical Exam   GENERAL: alert and no distress  NECK: no adenopathy, no asymmetry, masses, or scars  RESP: lungs clear to auscultation - no rales, rhonchi or wheezes  CV: regular rate and rhythm, normal S1 S2, no S3 or S4, no murmur, click or rub, no peripheral edema  ABDOMEN: soft, nontender, no hepatosplenomegaly, no masses and bowel sounds normal  MS: no gross musculoskeletal defects noted, no edema        Signed Electronically by: Natasha Mckeon MD    "

## 2025-01-02 NOTE — PATIENT INSTRUCTIONS
As discussed, since you are already out of the window period for your viral infection - will need to treat this secondary bacterial infection     Sent in necessary medications to your pharmacy     Will check X ray to make sure no pneumonia    ==================================

## 2025-01-08 ENCOUNTER — MYC MEDICAL ADVICE (OUTPATIENT)
Dept: FAMILY MEDICINE | Facility: CLINIC | Age: 35
End: 2025-01-08
Payer: COMMERCIAL

## 2025-02-03 ENCOUNTER — OFFICE VISIT (OUTPATIENT)
Dept: INTERNAL MEDICINE | Facility: CLINIC | Age: 35
End: 2025-02-03
Payer: COMMERCIAL

## 2025-02-03 VITALS
WEIGHT: 117 LBS | HEIGHT: 59 IN | HEART RATE: 87 BPM | DIASTOLIC BLOOD PRESSURE: 71 MMHG | BODY MASS INDEX: 23.59 KG/M2 | SYSTOLIC BLOOD PRESSURE: 110 MMHG | OXYGEN SATURATION: 100 % | RESPIRATION RATE: 13 BRPM | TEMPERATURE: 97.4 F

## 2025-02-03 DIAGNOSIS — T14.8XXA BRUISING: ICD-10-CM

## 2025-02-03 DIAGNOSIS — R53.83 OTHER FATIGUE: Primary | ICD-10-CM

## 2025-02-03 LAB
ALBUMIN SERPL BCG-MCNC: 4.1 G/DL (ref 3.5–5.2)
ALP SERPL-CCNC: 49 U/L (ref 40–150)
ALT SERPL W P-5'-P-CCNC: 11 U/L (ref 0–50)
ANION GAP SERPL CALCULATED.3IONS-SCNC: 11 MMOL/L (ref 7–15)
AST SERPL W P-5'-P-CCNC: 19 U/L (ref 0–45)
BASOPHILS # BLD AUTO: 0 10E3/UL (ref 0–0.2)
BASOPHILS NFR BLD AUTO: 0 %
BILIRUB SERPL-MCNC: 0.4 MG/DL
BUN SERPL-MCNC: 9.2 MG/DL (ref 6–20)
CALCIUM SERPL-MCNC: 9 MG/DL (ref 8.8–10.4)
CHLORIDE SERPL-SCNC: 103 MMOL/L (ref 98–107)
CREAT SERPL-MCNC: 0.76 MG/DL (ref 0.51–0.95)
EGFRCR SERPLBLD CKD-EPI 2021: >90 ML/MIN/1.73M2
EOSINOPHIL # BLD AUTO: 0.1 10E3/UL (ref 0–0.7)
EOSINOPHIL NFR BLD AUTO: 1 %
ERYTHROCYTE [DISTWIDTH] IN BLOOD BY AUTOMATED COUNT: 12.7 % (ref 10–15)
FERRITIN SERPL-MCNC: 25 NG/ML (ref 6–175)
GLUCOSE SERPL-MCNC: 81 MG/DL (ref 70–99)
HCO3 SERPL-SCNC: 25 MMOL/L (ref 22–29)
HCT VFR BLD AUTO: 38.4 % (ref 35–47)
HGB BLD-MCNC: 13.2 G/DL (ref 11.7–15.7)
IMM GRANULOCYTES # BLD: 0 10E3/UL
IMM GRANULOCYTES NFR BLD: 0 %
LYMPHOCYTES # BLD AUTO: 3.4 10E3/UL (ref 0.8–5.3)
LYMPHOCYTES NFR BLD AUTO: 39 %
MCH RBC QN AUTO: 28.7 PG (ref 26.5–33)
MCHC RBC AUTO-ENTMCNC: 34.4 G/DL (ref 31.5–36.5)
MCV RBC AUTO: 84 FL (ref 78–100)
MONOCYTES # BLD AUTO: 0.6 10E3/UL (ref 0–1.3)
MONOCYTES NFR BLD AUTO: 6 %
NEUTROPHILS # BLD AUTO: 4.6 10E3/UL (ref 1.6–8.3)
NEUTROPHILS NFR BLD AUTO: 53 %
PLATELET # BLD AUTO: 246 10E3/UL (ref 150–450)
POTASSIUM SERPL-SCNC: 3.5 MMOL/L (ref 3.4–5.3)
PROT SERPL-MCNC: 7 G/DL (ref 6.4–8.3)
RBC # BLD AUTO: 4.6 10E6/UL (ref 3.8–5.2)
SODIUM SERPL-SCNC: 139 MMOL/L (ref 135–145)
TSH SERPL DL<=0.005 MIU/L-ACNC: 2.57 UIU/ML (ref 0.3–4.2)
VIT D+METAB SERPL-MCNC: 26 NG/ML (ref 20–50)
WBC # BLD AUTO: 8.7 10E3/UL (ref 4–11)

## 2025-02-03 PROCEDURE — 99213 OFFICE O/P EST LOW 20 MIN: CPT | Performed by: PHYSICIAN ASSISTANT

## 2025-02-03 PROCEDURE — 84443 ASSAY THYROID STIM HORMONE: CPT | Performed by: PHYSICIAN ASSISTANT

## 2025-02-03 PROCEDURE — 85025 COMPLETE CBC W/AUTO DIFF WBC: CPT | Performed by: PHYSICIAN ASSISTANT

## 2025-02-03 PROCEDURE — 82728 ASSAY OF FERRITIN: CPT | Performed by: PHYSICIAN ASSISTANT

## 2025-02-03 PROCEDURE — 82306 VITAMIN D 25 HYDROXY: CPT | Performed by: PHYSICIAN ASSISTANT

## 2025-02-03 PROCEDURE — 36415 COLL VENOUS BLD VENIPUNCTURE: CPT | Performed by: PHYSICIAN ASSISTANT

## 2025-02-03 PROCEDURE — 80053 COMPREHEN METABOLIC PANEL: CPT | Performed by: PHYSICIAN ASSISTANT

## 2025-02-03 NOTE — PROGRESS NOTES
Assessment & Plan     Other fatigue    - TSH with free T4 reflex; Future  - CBC with platelets and differential; Future  - Ferritin; Future  - Vitamin D Deficiency; Future  - Comprehensive metabolic panel (BMP + Alb, Alk Phos, ALT, AST, Total. Bili, TP); Future    Bruising    - TSH with free T4 reflex; Future  - CBC with platelets and differential; Future  - Ferritin; Future  - Vitamin D Deficiency; Future  - Comprehensive metabolic panel (BMP + Alb, Alk Phos, ALT, AST, Total. Bili, TP); Future    Reviewed results via my chart   Recommend to follow up with primary provider on concerns   Bruising related to prednisone use likely reassurance not a clot or infection.             Subjective   Marisel is a 34 year old, presenting for the following health issues:  Derm Problem  Pt is experiencing black spots on legs  History of Present Illness       Reason for visit:  Bruises on legs  Symptom onset:  3-7 days ago   She is taking medications regularly.     Patient concerned about bruising on the left leg. This was noted at the end of last week. Just on the left leg. Thigh and calf random areas. She states slightly tender in these areas as well.   She denies any know injury. She just completed a second course of prednisone from ENT for sore  throat pain ( higher dosing with a taper)    Also expressing concerns about general fatigue and brain fog. This was mentioned at last visit with gyn provider in the fall. Vitamin D noted to be low she has been supplementing with 1000 units of Vitamin D 3 and is also on a prenatal vitamin.  She had a molar pregnancy last year and since then has had this fatigue etc. She has a child a home  She has been trying for another pregnancy. She has an upcoming appt with gyn in 2 weeks.   Works from home and does sit a lot               Review of Systems  Constitutional, HEENT, cardiovascular, pulmonary, gi and gu systems are negative, except as otherwise noted.      Objective    /71   Pulse  "87   Temp 97.4  F (36.3  C) (Temporal)   Resp 13   Ht 1.499 m (4' 11\")   Wt 53.1 kg (117 lb)   LMP 01/17/2025 (Exact Date)   SpO2 100%   BMI 23.63 kg/m    Body mass index is 23.63 kg/m .  Physical Exam   GENERAL: alert and no distress  RESP: lungs clear to auscultation - no rales, rhonchi or wheezes  CV: regular rates and rhythm and normal S1 S2, no S3 or S4  MS: no gross musculoskeletal defects noted, no edema  SKIN: appears to be some purplish bruising upper posterior thigh and then to the side of the lower left calf lateral shin ? From sitting             Signed Electronically by: Cait Olivas PA-C    "

## 2025-02-12 ENCOUNTER — DOCUMENTATION ONLY (OUTPATIENT)
Dept: OBGYN | Facility: CLINIC | Age: 35
End: 2025-02-12

## 2025-02-12 DIAGNOSIS — Z32.01 PREGNANCY TEST POSITIVE: Primary | ICD-10-CM

## 2025-02-12 NOTE — PROGRESS NOTES
"Patient has lab only appt 2/14/25 for \"hcg test\", no orders in chart.  Please place FUTURE orders in Epic if appropriate.    Thanks,   lab    "

## 2025-02-13 NOTE — PROGRESS NOTES
LMP 1/15/25   4w 1d    Positive home pregnancy test.  Pt has a history of a molar pregnancy in 04/2024.     Per OV note 9/30/24 - Would recommend early serial beta hCGs and a 6 to 7-week transvaginal ultrasound in a future pregnancy.     Orders entered for serial HCG's. Pt wanting to go to [facility] only for these draws. Will have her first one on 2/14 then will be out of town until Monday. Will have next one done Monday 2/17 morning and then again on Wednesday 2/19.    Pt will call scheduling line and make all of her new OB prenatal appointments. Pt told to make ultrasound appointment at 6-7 weeks gestation. Pt states understanding.     Routing to provider as FYI and to watch for results.  Will also route to Dr Max who is oncall provider on 2/14 as Dr Farnaz Pratt is out of clinic.    Sheri WADDELL RN  [facility] [place]

## 2025-02-18 ENCOUNTER — LAB (OUTPATIENT)
Dept: LAB | Facility: CLINIC | Age: 35
End: 2025-02-18
Payer: COMMERCIAL

## 2025-02-18 DIAGNOSIS — Z32.01 PREGNANCY TEST POSITIVE: ICD-10-CM

## 2025-02-18 LAB — HCG INTACT+B SERPL-ACNC: 439 MIU/ML

## 2025-02-18 PROCEDURE — 84702 CHORIONIC GONADOTROPIN TEST: CPT

## 2025-02-18 PROCEDURE — 36415 COLL VENOUS BLD VENIPUNCTURE: CPT

## 2025-02-20 ENCOUNTER — LAB (OUTPATIENT)
Dept: LAB | Facility: CLINIC | Age: 35
End: 2025-02-20
Payer: COMMERCIAL

## 2025-02-20 DIAGNOSIS — Z32.01 PREGNANCY TEST POSITIVE: ICD-10-CM

## 2025-02-20 LAB — HCG INTACT+B SERPL-ACNC: 1105 MIU/ML

## 2025-02-26 ENCOUNTER — VIRTUAL VISIT (OUTPATIENT)
Dept: OBGYN | Facility: CLINIC | Age: 35
End: 2025-02-26
Payer: COMMERCIAL

## 2025-02-26 DIAGNOSIS — Z34.80 PRENATAL CARE, SUBSEQUENT PREGNANCY, UNSPECIFIED TRIMESTER: Primary | ICD-10-CM

## 2025-02-26 PROCEDURE — 99207 PR NO CHARGE NURSE ONLY: CPT | Mod: 93

## 2025-02-26 NOTE — PROGRESS NOTES
NPN nurse visit done over the phone. Pt will be given NPN folder and book at her upcoming appt.  Discussed optional screening available to assess chromosomal anomalies. Questions answered. Informed pt of the clinic structure (on-call does deliveries for the day, may be male or female doctor). Pt advised to call the clinic if she has any questions or concerns related to her pregnancy. Prenatal labs will be obtained at her upcoming appt. New prenatal visit scheduled on 3/18/25 with Dr Farnaz Pratt.    6w0d    Menstrual cycles: regular, 30-33 days  Date of positive pregnancy test: 2/12/25  Medications stopped upon pos HPT: none    Last pap: pt reports last year in [place], normal    Hx of molar, had HCG quants done. U/s on Mon      Patient supplied answers from flow sheet for:  Prenatal OB Questionnaire.  Past Medical History  Have you ever recieved care for your mental health? : No  Have you ever been in a major accident or suffered serious trauma?: No  Within the last year, has anyone hit, slapped, kicked or otherwise hurt you?: No  In the last year, has anyone forced you to have sex when you didn't want to?: No    Past Medical History 2   Have you ever received a blood transfusion?: (!) Yes (as a child, no complications)  Would you accept a blood transfusion if was medically recommended?: Yes  Does anyone in your home smoke?: No   Is your blood type Rh negative?: No  Have you ever ?: (!) Yes  Have you been hospitalized for a nonsurgical reason excluding normal delivery?: No  Have you ever had an abnormal pap smear?: No    Past Medical History (Continued)  Do you have a history of abnormalities of the uterus?: No  Did your mother take WILLY or any other hormones when she was pregnant with you?: No  Do you have any other problems we have not asked about which you feel may be important to this pregnancy?: No

## 2025-03-02 LAB
ABO + RH BLD: NORMAL
BLD GP AB SCN SERPL QL: NEGATIVE
SPECIMEN EXP DATE BLD: NORMAL

## 2025-03-03 ENCOUNTER — ANCILLARY PROCEDURE (OUTPATIENT)
Dept: ULTRASOUND IMAGING | Facility: CLINIC | Age: 35
End: 2025-03-03
Payer: COMMERCIAL

## 2025-03-03 ENCOUNTER — LAB (OUTPATIENT)
Dept: LAB | Facility: CLINIC | Age: 35
End: 2025-03-03
Payer: COMMERCIAL

## 2025-03-03 DIAGNOSIS — Z34.80 PRENATAL CARE, SUBSEQUENT PREGNANCY, UNSPECIFIED TRIMESTER: ICD-10-CM

## 2025-03-03 LAB
ERYTHROCYTE [DISTWIDTH] IN BLOOD BY AUTOMATED COUNT: 12.6 % (ref 10–15)
EST. AVERAGE GLUCOSE BLD GHB EST-MCNC: 97 MG/DL
HBA1C MFR BLD: 5 % (ref 0–5.6)
HBV SURFACE AG SERPL QL IA: NONREACTIVE
HCT VFR BLD AUTO: 34.2 % (ref 35–47)
HCV AB SERPL QL IA: NONREACTIVE
HGB BLD-MCNC: 12.3 G/DL (ref 11.7–15.7)
HIV 1+2 AB+HIV1 P24 AG SERPL QL IA: NONREACTIVE
MCH RBC QN AUTO: 29.9 PG (ref 26.5–33)
MCHC RBC AUTO-ENTMCNC: 36 G/DL (ref 31.5–36.5)
MCV RBC AUTO: 83 FL (ref 78–100)
PLATELET # BLD AUTO: 217 10E3/UL (ref 150–450)
RBC # BLD AUTO: 4.12 10E6/UL (ref 3.8–5.2)
T PALLIDUM AB SER QL: NONREACTIVE
WBC # BLD AUTO: 7.8 10E3/UL (ref 4–11)

## 2025-03-03 PROCEDURE — 86900 BLOOD TYPING SEROLOGIC ABO: CPT

## 2025-03-03 PROCEDURE — 36415 COLL VENOUS BLD VENIPUNCTURE: CPT

## 2025-03-03 PROCEDURE — 76817 TRANSVAGINAL US OBSTETRIC: CPT | Performed by: OBSTETRICS & GYNECOLOGY

## 2025-03-03 PROCEDURE — 86901 BLOOD TYPING SEROLOGIC RH(D): CPT

## 2025-03-03 PROCEDURE — 76801 OB US < 14 WKS SINGLE FETUS: CPT | Performed by: OBSTETRICS & GYNECOLOGY

## 2025-03-03 PROCEDURE — 87086 URINE CULTURE/COLONY COUNT: CPT

## 2025-03-03 PROCEDURE — 87389 HIV-1 AG W/HIV-1&-2 AB AG IA: CPT

## 2025-03-03 PROCEDURE — 86803 HEPATITIS C AB TEST: CPT

## 2025-03-03 PROCEDURE — 86762 RUBELLA ANTIBODY: CPT

## 2025-03-03 PROCEDURE — 86780 TREPONEMA PALLIDUM: CPT

## 2025-03-03 PROCEDURE — 87340 HEPATITIS B SURFACE AG IA: CPT

## 2025-03-03 PROCEDURE — 85027 COMPLETE CBC AUTOMATED: CPT

## 2025-03-03 PROCEDURE — 83036 HEMOGLOBIN GLYCOSYLATED A1C: CPT

## 2025-03-03 PROCEDURE — 86850 RBC ANTIBODY SCREEN: CPT

## 2025-03-04 LAB
BACTERIA UR CULT: NORMAL
RUBV IGG SERPL QL IA: 21 INDEX
RUBV IGG SERPL QL IA: POSITIVE

## 2025-03-18 ENCOUNTER — PRENATAL OFFICE VISIT (OUTPATIENT)
Dept: OBGYN | Facility: CLINIC | Age: 35
End: 2025-03-18
Payer: COMMERCIAL

## 2025-03-18 VITALS
SYSTOLIC BLOOD PRESSURE: 90 MMHG | HEIGHT: 59 IN | BODY MASS INDEX: 22.98 KG/M2 | WEIGHT: 114 LBS | DIASTOLIC BLOOD PRESSURE: 60 MMHG

## 2025-03-18 DIAGNOSIS — Z11.3 SCREENING EXAMINATION FOR STI: Primary | ICD-10-CM

## 2025-03-18 DIAGNOSIS — O21.9 NAUSEA AND VOMITING DURING PREGNANCY: ICD-10-CM

## 2025-03-18 DIAGNOSIS — Z34.80 PRENATAL CARE, SUBSEQUENT PREGNANCY, UNSPECIFIED TRIMESTER: ICD-10-CM

## 2025-03-18 PROCEDURE — 87491 CHLMYD TRACH DNA AMP PROBE: CPT | Performed by: OBSTETRICS & GYNECOLOGY

## 2025-03-18 RX ORDER — METOCLOPRAMIDE 5 MG/1
5 TABLET ORAL
Qty: 30 TABLET | Refills: 4 | Status: SHIPPED | OUTPATIENT
Start: 2025-03-18

## 2025-03-18 RX ORDER — ONDANSETRON 4 MG/1
4 TABLET, ORALLY DISINTEGRATING ORAL EVERY 8 HOURS PRN
Qty: 25 TABLET | Refills: 2 | Status: SHIPPED | OUTPATIENT
Start: 2025-03-18

## 2025-03-18 NOTE — PROGRESS NOTES
Marisel is a 34 year old  at 8w6d here for new ob visit.      Planned pregnancy.  FOB Butch, daughter Jennifer is 4.5 years old.   Severe nausea so far, is vomiting 3-4x/day.  Hasn't been eating much.  Hasn't lost weight, was 114 lbs prior to pregnancy and still is today.  Has been taking unisom and b6 BID, but it makes her so tired in the mornings and now she has to work during the day whereas last pregnancy she was working remotely.  Last pregnancy her nausea resolved by 10 weeks.     H/o molar pregnancy and suction D&C 2024.  Hypothyroidism     OB History    Para Term  AB Living   4 1 1 0 2 1   SAB IAB Ectopic Multiple Live Births   0 0 0 0 1      # Outcome Date GA Lbr Jimy/2nd Weight Sex Type Anes PTL Lv   4 Current            3 Molar 2024              Birth Comments: suction D&C   2 Term 20 38w0d 12:50 / 03:14 3.18 kg (7 lb 0.2 oz) F Vag-Vacuum EPI N LUZ      Complications: GBS, Prolonged PROM (>18 hours), Dysfunctional Labor      Name: MESSI,FEMALE-MARISEL      Apgar1: 7  Apgar5: 9   1 AB 19 8w4d              ROS: Ten point review of systems was reviewed and negative except the above.    Past Medical History:   Diagnosis Date    History of blood transfusion     as a child    History of molar pregnancy 2024    Hypothyroidism     Primary female infertility     Vacuum extraction, delivered, current hospitalization 2020    Varicella      Past Surgical History:   Procedure Laterality Date    DILATION AND CURETTAGE SUCTION N/A 4/10/2024    Procedure: DILATION AND CURETTAGE, UTERUS, USING SUCTION with ultrasound, repair of vaginal laceration;  Surgeon: Miladis Fisher MD;  Location:  OR    ESOPHAGOSCOPY, GASTROSCOPY, DUODENOSCOPY (EGD), COMBINED N/A 2019    Procedure: ESOPHAGOGASTRODUODENOSCOPY, WITH BIOPSY;  Surgeon: Evan Aaron MD;  Location:  GI     Patient Active Problem List    Diagnosis Date Noted    History of molar pregnancy 2024     Priority:  "Medium    Vacuum extraction, delivered, current hospitalization 2020     Priority: Medium    Female infertility 10/02/2019     Priority: Medium    History of miscarriage 10/02/2019     Priority: Medium      around 8 weeks 4 days gestation.         No Known Allergies  Current Outpatient Medications   Medication Sig Dispense Refill    metoclopramide (REGLAN) 5 MG tablet Take 1 tablet (5 mg) by mouth 4 times daily (before meals and nightly). 30 tablet 4    ondansetron (ZOFRAN ODT) 4 MG ODT tab Take 1 tablet (4 mg) by mouth every 8 hours as needed for nausea or vomiting. 25 tablet 2    Prenatal Vit-Fe Fumarate-FA (PRENATAL VITAMIN) 27-0.8 MG TABS Take 1 each by mouth daily. 90 tablet 3    promethazine (PHENERGAN) 50 MG suppository Place 0.5 suppositories (25 mg) rectally every 6 hours as needed for nausea. 8 suppository 2    Vitamin D, Cholecalciferol, 25 MCG (1000 UT) CAPS Take by mouth.       No current facility-administered medications for this visit.       Physical Exam:   BP 90/60 (BP Location: Left arm, Patient Position: Chair, Cuff Size: Adult Regular)   Ht 1.499 m (4' 11\")   Wt 51.7 kg (114 lb)   LMP 01/15/2025 (Exact Date)   BMI 23.03 kg/m      Gen:  no acute distress, comfortable, smiling  HENT: No scleral injection or icterus  CV: Regular rate and rhythm, no m/g/r  Resp: Normal work of breathing, no cough  GI: Abdomen soft, non-tender. No masses, organomegaly  Skin: No suspicious lesions or rashes  Psychiatric: mentation appears normal and affect bright        Lab Results   Component Value Date    ABO A 2020    RH Pos 2020       A/P 34 year old  at 8w6d here for NOB visit.  - Discussed physician coverage, tertiary support, diet, exercise, weight gain, schedule of visits, routine and indicated ultrasounds, and childbirth education.  Discussed MFM coverage and reviewed options for  testing in the first trimester.  Recommended PNV.  NOB labs and US reviewed.   "     Concerns:  - A+/RI.  FOB Butch.  NIPT ordered, will do at RN visit after 10 wks.  - hyperemesis: will do pepcid BID, reglan up to QID prn, zofran ODT prn, phenergan pr (if not using reglan), unisom double dose at night, B6 QID.    - history of molar pregnancy s/p D&C.  After delivery plan to send placenta to pathology for histology, and measure HCG six weeks postpartum.   - hypothyroidism: normal TSH last month, plan to check TSH with GCT labs  - Pt not recommended for 81 mg baby ASA for pre-eclampsia prevention based of risk factors.  Low Dose Aspirin for Preeclampsia Prevention Assessment (1 high risk or 2 moderate RF):  High risk: previous pregnancy with preeclampsia, multifetal gestation, chronic htn, diabetes, chronic kidney disease, autoimmune disorder  Medium risk: nulliparity, BMI >30, family hx preeclampsia, age >/= 35,  race, low SES, personal risk factors (hx low birth weight, hx stillbirth, >10yrs between pregnancies).    RTC 4 weeks    Angeline Pratt MD, MPH  [facility]

## 2025-03-18 NOTE — NURSING NOTE
"Chief Complaint   Patient presents with    Prenatal Care     NOB 8 6/7 weeks, extreme nausea and vomiting       Initial BP 90/60 (BP Location: Left arm, Patient Position: Chair, Cuff Size: Adult Regular)   Ht 1.499 m (4' 11\")   Wt 51.7 kg (114 lb)   LMP 01/15/2025 (Exact Date)   BMI 23.03 kg/m   Estimated body mass index is 23.03 kg/m  as calculated from the following:    Height as of this encounter: 1.499 m (4' 11\").    Weight as of this encounter: 51.7 kg (114 lb).  BP completed using cuff size: regular    Questioned patient about current smoking habits.  Pt. has never smoked.          The following HM Due: GC/C will do urine today  Rashmi Forrest CMA          "

## 2025-03-19 LAB
C TRACH DNA SPEC QL NAA+PROBE: NEGATIVE
N GONORRHOEA DNA SPEC QL NAA+PROBE: NEGATIVE
SPECIMEN TYPE: NORMAL
SPECIMEN TYPE: NORMAL

## 2025-03-28 ENCOUNTER — ANCILLARY ORDERS (OUTPATIENT)
Dept: OBGYN | Facility: CLINIC | Age: 35
End: 2025-03-28

## 2025-03-28 ENCOUNTER — ANCILLARY PROCEDURE (OUTPATIENT)
Dept: ULTRASOUND IMAGING | Facility: CLINIC | Age: 35
End: 2025-03-28
Attending: OBSTETRICS & GYNECOLOGY
Payer: COMMERCIAL

## 2025-03-28 DIAGNOSIS — Z34.80 PRENATAL CARE, SUBSEQUENT PREGNANCY, UNSPECIFIED TRIMESTER: ICD-10-CM

## 2025-03-28 DIAGNOSIS — O21.9 NAUSEA AND VOMITING DURING PREGNANCY: ICD-10-CM

## 2025-03-28 DIAGNOSIS — Z11.3 SCREENING EXAMINATION FOR STI: Primary | ICD-10-CM

## 2025-03-28 PROCEDURE — 76801 OB US < 14 WKS SINGLE FETUS: CPT | Performed by: FAMILY MEDICINE

## 2025-04-15 ENCOUNTER — PRENATAL OFFICE VISIT (OUTPATIENT)
Dept: OBGYN | Facility: CLINIC | Age: 35
End: 2025-04-15
Payer: COMMERCIAL

## 2025-04-15 VITALS
SYSTOLIC BLOOD PRESSURE: 100 MMHG | HEIGHT: 59 IN | DIASTOLIC BLOOD PRESSURE: 60 MMHG | WEIGHT: 115 LBS | BODY MASS INDEX: 23.18 KG/M2

## 2025-04-15 DIAGNOSIS — Z34.80 PRENATAL CARE, SUBSEQUENT PREGNANCY, UNSPECIFIED TRIMESTER: Primary | ICD-10-CM

## 2025-04-15 DIAGNOSIS — Z34.80 SUPERVISION OF OTHER NORMAL PREGNANCY, ANTEPARTUM: ICD-10-CM

## 2025-04-15 PROCEDURE — 0502F SUBSEQUENT PRENATAL CARE: CPT | Performed by: OBSTETRICS & GYNECOLOGY

## 2025-04-15 PROCEDURE — 3078F DIAST BP <80 MM HG: CPT | Performed by: OBSTETRICS & GYNECOLOGY

## 2025-04-15 PROCEDURE — 99207 PR PRENATAL VISIT: CPT | Performed by: OBSTETRICS & GYNECOLOGY

## 2025-04-15 PROCEDURE — 3074F SYST BP LT 130 MM HG: CPT | Performed by: OBSTETRICS & GYNECOLOGY

## 2025-04-15 NOTE — NURSING NOTE
"Chief Complaint   Patient presents with    Prenatal Care     12 6/7 weeks       Initial /60 (BP Location: Right arm, Patient Position: Chair, Cuff Size: Adult Regular)   Ht 1.499 m (4' 11\")   Wt 52.2 kg (115 lb)   LMP 01/15/2025 (Exact Date)   BMI 23.23 kg/m   Estimated body mass index is 23.23 kg/m  as calculated from the following:    Height as of this encounter: 1.499 m (4' 11\").    Weight as of this encounter: 52.2 kg (115 lb).  BP completed using cuff size: regular    Questioned patient about current smoking habits.  Pt. has never smoked.          The following HM Due: NONE  Rashmi Forrest CMA    "

## 2025-04-15 NOTE — PROGRESS NOTES
34 year old  at 12w6d     - A+/RI.  FOB Butch.  NIPT nl XY.  Anatomy US ordered  - hyperemesis, improving now.  pepcid BID, reglan up to QID prn, zofran ODT prn, phenergan pr , unisom double dose at night, B6 QID.    - history of molar pregnancy s/p D&C.  After delivery plan to send placenta to pathology for histology, and measure HCG six weeks postpartum.   - hypothyroidism: normal TSH last month, plan to check TSH with GCT labs  - Pt not recommended for 81 mg baby ASA    Angeline Pratt MD, MPH  [facility]

## 2025-04-29 ENCOUNTER — ANCILLARY PROCEDURE (OUTPATIENT)
Dept: ULTRASOUND IMAGING | Facility: CLINIC | Age: 35
End: 2025-04-29
Attending: OBSTETRICS & GYNECOLOGY
Payer: COMMERCIAL

## 2025-04-29 ENCOUNTER — TELEPHONE (OUTPATIENT)
Dept: OBGYN | Facility: CLINIC | Age: 35
End: 2025-04-29

## 2025-04-29 ENCOUNTER — LAB (OUTPATIENT)
Dept: LAB | Facility: CLINIC | Age: 35
End: 2025-04-29
Payer: COMMERCIAL

## 2025-04-29 ENCOUNTER — PRENATAL OFFICE VISIT (OUTPATIENT)
Dept: OBGYN | Facility: CLINIC | Age: 35
End: 2025-04-29
Payer: COMMERCIAL

## 2025-04-29 VITALS
WEIGHT: 118 LBS | SYSTOLIC BLOOD PRESSURE: 100 MMHG | HEIGHT: 59 IN | BODY MASS INDEX: 23.79 KG/M2 | DIASTOLIC BLOOD PRESSURE: 60 MMHG

## 2025-04-29 DIAGNOSIS — O26.899 ABDOMINAL PAIN DURING PREGNANCY: ICD-10-CM

## 2025-04-29 DIAGNOSIS — R10.9 ABDOMINAL PAIN DURING PREGNANCY: ICD-10-CM

## 2025-04-29 DIAGNOSIS — R10.31 RLQ ABDOMINAL PAIN: Primary | ICD-10-CM

## 2025-04-29 DIAGNOSIS — R10.9 ABDOMINAL PAIN DURING PREGNANCY: Primary | ICD-10-CM

## 2025-04-29 DIAGNOSIS — O26.899 ABDOMINAL PAIN DURING PREGNANCY: Primary | ICD-10-CM

## 2025-04-29 LAB
ALBUMIN UR-MCNC: NEGATIVE MG/DL
APPEARANCE UR: CLEAR
BACTERIA #/AREA URNS HPF: ABNORMAL /HPF
BILIRUB UR QL STRIP: NEGATIVE
COLOR UR AUTO: YELLOW
GLUCOSE UR STRIP-MCNC: NEGATIVE MG/DL
HGB UR QL STRIP: NEGATIVE
KETONES UR STRIP-MCNC: NEGATIVE MG/DL
LEUKOCYTE ESTERASE UR QL STRIP: ABNORMAL
NITRATE UR QL: NEGATIVE
PH UR STRIP: 6.5 [PH] (ref 5–7)
RBC #/AREA URNS AUTO: ABNORMAL /HPF
SP GR UR STRIP: 1.01 (ref 1–1.03)
SQUAMOUS #/AREA URNS AUTO: ABNORMAL /LPF
UROBILINOGEN UR STRIP-ACNC: 0.2 E.U./DL
WBC #/AREA URNS AUTO: ABNORMAL /HPF

## 2025-04-29 PROCEDURE — 3078F DIAST BP <80 MM HG: CPT | Performed by: OBSTETRICS & GYNECOLOGY

## 2025-04-29 PROCEDURE — 0502F SUBSEQUENT PRENATAL CARE: CPT | Performed by: OBSTETRICS & GYNECOLOGY

## 2025-04-29 PROCEDURE — 99214 OFFICE O/P EST MOD 30 MIN: CPT | Performed by: OBSTETRICS & GYNECOLOGY

## 2025-04-29 PROCEDURE — G2211 COMPLEX E/M VISIT ADD ON: HCPCS | Performed by: OBSTETRICS & GYNECOLOGY

## 2025-04-29 PROCEDURE — 87086 URINE CULTURE/COLONY COUNT: CPT

## 2025-04-29 PROCEDURE — 81001 URINALYSIS AUTO W/SCOPE: CPT

## 2025-04-29 PROCEDURE — 76816 OB US FOLLOW-UP PER FETUS: CPT | Performed by: OBSTETRICS & GYNECOLOGY

## 2025-04-29 PROCEDURE — 3074F SYST BP LT 130 MM HG: CPT | Performed by: OBSTETRICS & GYNECOLOGY

## 2025-04-29 RX ORDER — CYCLOBENZAPRINE HCL 5 MG
5 TABLET ORAL 3 TIMES DAILY PRN
Qty: 12 TABLET | Refills: 2 | Status: SHIPPED | OUTPATIENT
Start: 2025-04-29

## 2025-04-29 NOTE — PROGRESS NOTES
"  SUBJECTIVE:                                                   CC:  Patient presents with:  Prenatal Care: 14 6/7 weeks, stabing RLQ pain since last night around 11pm      History of Present Illness-  - Marisel NUBIA Orlando, 34-year-old female.  - Experiencing stabbing pain in the lower abdomen or pelvis since last night, approximately 12 hours duration.  - Pain occurs every 20-30 seconds, not constant, described as flashy and zinging down the legs.  - Unable to stand, sit, or sleep due to pain; did not sleep at all last night.  - Last bowel movement was yesterday afternoon, not constipated.  - Increased vaginal discharge noted.  - Not eating much, no significant nausea or vomiting recently, occasional vomiting in the past few weeks.  - No fever, nausea, or vomiting associated with current pain.  - Concerned about the pain due to previous bad experiences and having a school-going child to care for.     PMH, PSH, Soc Hx, Fam Hx, Meds, and allergies reviewed in Epic.    OBJECTIVE:     /60 (BP Location: Left arm, Patient Position: Chair, Cuff Size: Adult Regular)   Ht 1.499 m (4' 11\")   Wt 53.5 kg (118 lb)   LMP 01/15/2025 (Exact Date)   BMI 23.83 kg/m      Gen: Healthy appearing thin female, no acute distress, comfortable, slightly hunched over palpating right abdomen  Psych: mentation appears normal and affect bright    Physical Exam- ABDOMEN: Nontender upon palpation., describes the pain at the right very low groin area    Test Results:  Results for orders placed or performed in visit on 04/29/25 (from the past 24 hours)   UA reflex to Microscopic - lab collect   Result Value Ref Range    Color Urine Yellow Colorless, Straw, Light Yellow, Yellow    Appearance Urine Clear Clear    Glucose Urine Negative Negative mg/dL    Bilirubin Urine Negative Negative    Ketones Urine Negative Negative mg/dL    Specific Gravity Urine 1.010 1.003 - 1.035    Blood Urine Negative Negative    pH Urine 6.5 5.0 - 7.0    Protein " Albumin Urine Negative Negative mg/dL    Urobilinogen Urine 0.2 0.2, 1.0 E.U./dL    Nitrite Urine Negative Negative    Leukocyte Esterase Urine Small (A) Negative   Urine Microscopic Exam   Result Value Ref Range    Bacteria Urine None Seen None Seen /HPF    RBC Urine 0-2 0-2 /HPF /HPF    WBC Urine 0-5 0-5 /HPF /HPF    Squamous Epithelials Urine Moderate (A) None Seen /LPF       Results  - Ultrasound: Normal results; no abnormalities detected on the right lower side where pain is reported. Baby's heartbeat is normal.  - Ultrasound: Tiny cyst observed on the left ovary, not concerning.  - Urine culture: Pending results.     [facility]  ULTRASOUND - OB FOLLOW UP > 14 Weeks- Transabdominal     Referring Provider: Angeline Pratt MD     ====================================  INDICATIONS FOR ULTRASOUND:  OB History: 1st trimester loss (miscarriage), molar  Present Conditions: RLQ pain      CLINICAL INFORMATION     LMP: 15 Juan 2025  - sure  EDC: 22 Oct 25    EGA: 14w 6d                                                                                        =================  [place] Gestation.     Fetal presentation: Breech  Placenta: Posterior       MEASUREMENTS  BPD 2.68 cm 14w5d 33.1%   HC 10.03 cm 14w5d 20.3%   AC 7.66 cm 14w1d 28.6%   FL 1.47 cm 14w2d 23.8%   HL 1.78 cm 15w1d 69.3%   Cardiac activity 165 bpm       Amniotic fluid 2.93 cm MVP       EFW (lbs/oz) 0 lbs 3ozs       EFW (g) 94 g 9.8%     EDC:   10/24/25 GA by Current Scan: 14w4d correspond        ===============  MATERNAL ANATOMY     Right Ovary: Visualized  Left Ovary: Visualized- complex cyst 1.6 x 1.6 x 1.5 cm      *Other Findings:   Technique: Transabdominal Imaging performed  ======================================  Impression:     Single intrauterine pregnancy with fetal cardiac activity.   Corresponding sonographic and menstrual EGA and EDC with appropriate interval growth.   Based on this, best EDC for this pregnancy is Oct 22,  2025.    Grossly normal amniotic fluid.     Complex ovarian cyst seen, appears benign and functional, consider follow-up at the time of full anatomy screen.      Normal early OB ultrasound.    ASSESSMENT/PLAN:                                                      1. RLQ abdominal pain (Primary)  - CBC with platelets and differential; Future  - Basic metabolic panel  (Ca, Cl, CO2, Creat, Gluc, K, Na, BUN); Future  - cyclobenzaprine (FLEXERIL) 5 MG tablet; Take 1 tablet (5 mg) by mouth 3 times daily as needed for muscle spasms.  Dispense: 12 tablet; Refill: 2    Assessment & Plan  RLQ abdominal pain:  - Likely round ligament pain due to uterine growth, consistent with pregnancy. Differential diagnosis includes intestinal cramp or colicky pain. Unlikely to be appendicitis due to lack of nausea, vomiting, or fever. Ultrasound normal, with a small cyst on the left ovary not causing concern.  - Recommend taking 1000 mg of Tylenol with a dose of Flexeril. Use a warm pack for muscle relaxation. Maintain activity levels as tolerated. If pain does not improve by the next day, order labs to rule out infection.       Angeline Pratt MD, MPH  Obstetrics and Gynecology

## 2025-04-29 NOTE — NURSING NOTE
"Chief Complaint   Patient presents with    Prenatal Care     14 6/7 weeks, stabing RLQ pain since last night around 11pm         Initial /60 (BP Location: Left arm, Patient Position: Chair, Cuff Size: Adult Regular)   Ht 1.499 m (4' 11\")   Wt 53.5 kg (118 lb)   LMP 01/15/2025 (Exact Date)   BMI 23.83 kg/m   Estimated body mass index is 23.83 kg/m  as calculated from the following:    Height as of this encounter: 1.499 m (4' 11\").    Weight as of this encounter: 53.5 kg (118 lb).  BP completed using cuff size: regular    Questioned patient about current smoking habits.  Pt. has never smoked.          The following HM Due: NONE    Rashmi Forrest CMA      "

## 2025-04-29 NOTE — TELEPHONE ENCOUNTER
Pt calling.   14w6d  States that since around 11 pm last night she has been having a sharp stabbing pain in her RLQ that keeps coming and going. She was unable to sleep.     Denies vag bleeding.     Denies dysuria.     Denies any GI issues.     Pt is very concerned.     Pt will have an US and a UA/UC done this am with an appt with the md to follow.     Pt refuses to go to the ED due to the cost.    [facility]

## 2025-04-30 LAB — BACTERIA UR CULT: NORMAL

## 2025-05-13 ENCOUNTER — PRENATAL OFFICE VISIT (OUTPATIENT)
Dept: OBGYN | Facility: CLINIC | Age: 35
End: 2025-05-13
Payer: COMMERCIAL

## 2025-05-13 VITALS
BODY MASS INDEX: 23.18 KG/M2 | HEIGHT: 59 IN | SYSTOLIC BLOOD PRESSURE: 90 MMHG | DIASTOLIC BLOOD PRESSURE: 50 MMHG | WEIGHT: 115 LBS

## 2025-05-13 DIAGNOSIS — Z34.80 SUPERVISION OF OTHER NORMAL PREGNANCY, ANTEPARTUM: Primary | ICD-10-CM

## 2025-05-13 PROCEDURE — 99207 PR PRENATAL VISIT: CPT | Performed by: OBSTETRICS & GYNECOLOGY

## 2025-05-13 PROCEDURE — 0502F SUBSEQUENT PRENATAL CARE: CPT | Performed by: OBSTETRICS & GYNECOLOGY

## 2025-05-13 PROCEDURE — 3078F DIAST BP <80 MM HG: CPT | Performed by: OBSTETRICS & GYNECOLOGY

## 2025-05-13 PROCEDURE — 3074F SYST BP LT 130 MM HG: CPT | Performed by: OBSTETRICS & GYNECOLOGY

## 2025-05-13 NOTE — PROGRESS NOTES
34 year old  at 16w6d     - A+/RI.  FOB Butch.  NIPT nl XY.  Anatomy US ordered  - history of molar pregnancy s/p D&C.  After delivery plan to send placenta to pathology for histology, and measure HCG six weeks postpartum.   - hypothyroidism: normal TSH in first tri, plan to check TSH with GCT labs  - restless legs/achy legs at night: will try magnesium  - RLQ pain at 14 wks: resolved after tylenol    RTC 4 wks    Angeline Pratt MD, MPH  [facility]

## 2025-05-30 ENCOUNTER — ANCILLARY PROCEDURE (OUTPATIENT)
Dept: ULTRASOUND IMAGING | Facility: CLINIC | Age: 35
End: 2025-05-30
Attending: OBSTETRICS & GYNECOLOGY
Payer: COMMERCIAL

## 2025-05-30 DIAGNOSIS — Z34.80 PRENATAL CARE, SUBSEQUENT PREGNANCY, UNSPECIFIED TRIMESTER: ICD-10-CM

## 2025-05-30 PROCEDURE — 76805 OB US >/= 14 WKS SNGL FETUS: CPT | Performed by: OBSTETRICS & GYNECOLOGY

## 2025-06-09 ENCOUNTER — PRENATAL OFFICE VISIT (OUTPATIENT)
Dept: OBGYN | Facility: CLINIC | Age: 35
End: 2025-06-09
Payer: COMMERCIAL

## 2025-06-09 VITALS
WEIGHT: 118 LBS | HEIGHT: 59 IN | BODY MASS INDEX: 23.79 KG/M2 | SYSTOLIC BLOOD PRESSURE: 90 MMHG | DIASTOLIC BLOOD PRESSURE: 60 MMHG

## 2025-06-09 DIAGNOSIS — Z34.80 SUPERVISION OF OTHER NORMAL PREGNANCY, ANTEPARTUM: Primary | ICD-10-CM

## 2025-06-09 PROCEDURE — 3074F SYST BP LT 130 MM HG: CPT | Performed by: OBSTETRICS & GYNECOLOGY

## 2025-06-09 PROCEDURE — 3078F DIAST BP <80 MM HG: CPT | Performed by: OBSTETRICS & GYNECOLOGY

## 2025-06-09 PROCEDURE — 99207 PR PRENATAL VISIT: CPT | Performed by: OBSTETRICS & GYNECOLOGY

## 2025-06-09 PROCEDURE — 0502F SUBSEQUENT PRENATAL CARE: CPT | Performed by: OBSTETRICS & GYNECOLOGY

## 2025-06-09 NOTE — NURSING NOTE
"Chief Complaint   Patient presents with    Prenatal Care     20 5/7 weeks       Initial BP 90/60 (BP Location: Right arm, Patient Position: Chair, Cuff Size: Adult Regular)   Ht 1.499 m (4' 11\")   Wt 53.5 kg (118 lb)   LMP 01/15/2025 (Exact Date)   BMI 23.83 kg/m   Estimated body mass index is 23.83 kg/m  as calculated from the following:    Height as of this encounter: 1.499 m (4' 11\").    Weight as of this encounter: 53.5 kg (118 lb).  BP completed using cuff size: regular    Questioned patient about current smoking habits.  Pt. has never smoked.          The following HM Due: NONE  +fetal ovement  Rashmi Forrest, CMA             "

## 2025-06-09 NOTE — PROGRESS NOTES
34 year old  at 20w5d     - A+/RI.  FOB Butch.  NIPT nl XY.  Anatomy US nl  - history of molar pregnancy s/p D&C.  After delivery plan to send placenta to pathology for histology, and measure HCG six weeks postpartum.   - hypothyroidism: normal TSH in first tri, plan to check TSH with GCT labs    RTC 4 wks    Angeline Pratt MD, MPH  [facility]

## 2025-07-07 ENCOUNTER — PRENATAL OFFICE VISIT (OUTPATIENT)
Dept: OBGYN | Facility: CLINIC | Age: 35
End: 2025-07-07
Payer: COMMERCIAL

## 2025-07-07 VITALS — SYSTOLIC BLOOD PRESSURE: 88 MMHG | DIASTOLIC BLOOD PRESSURE: 52 MMHG | BODY MASS INDEX: 25.11 KG/M2 | WEIGHT: 124.3 LBS

## 2025-07-07 DIAGNOSIS — R10.31 RLQ ABDOMINAL PAIN: ICD-10-CM

## 2025-07-07 DIAGNOSIS — Z34.80 SUPERVISION OF OTHER NORMAL PREGNANCY, ANTEPARTUM: Primary | ICD-10-CM

## 2025-07-07 LAB
ANION GAP SERPL CALCULATED.3IONS-SCNC: 10 MMOL/L (ref 7–15)
BASOPHILS # BLD AUTO: 0 10E3/UL (ref 0–0.2)
BASOPHILS NFR BLD AUTO: 0 %
BUN SERPL-MCNC: 8.5 MG/DL (ref 6–20)
CALCIUM SERPL-MCNC: 9 MG/DL (ref 8.8–10.4)
CHLORIDE SERPL-SCNC: 104 MMOL/L (ref 98–107)
CREAT SERPL-MCNC: 0.65 MG/DL (ref 0.51–0.95)
EGFRCR SERPLBLD CKD-EPI 2021: >90 ML/MIN/1.73M2
EOSINOPHIL # BLD AUTO: 0.2 10E3/UL (ref 0–0.7)
EOSINOPHIL NFR BLD AUTO: 2 %
ERYTHROCYTE [DISTWIDTH] IN BLOOD BY AUTOMATED COUNT: 14 % (ref 10–15)
GLUCOSE SERPL-MCNC: 133 MG/DL (ref 70–99)
HCO3 SERPL-SCNC: 24 MMOL/L (ref 22–29)
HCT VFR BLD AUTO: 32.6 % (ref 35–47)
HGB BLD-MCNC: 11.5 G/DL (ref 11.7–15.7)
IMM GRANULOCYTES # BLD: 0.1 10E3/UL
IMM GRANULOCYTES NFR BLD: 1 %
IRON SERPL-MCNC: 141 UG/DL (ref 37–145)
LYMPHOCYTES # BLD AUTO: 2.1 10E3/UL (ref 0.8–5.3)
LYMPHOCYTES NFR BLD AUTO: 20 %
MCH RBC QN AUTO: 31.3 PG (ref 26.5–33)
MCHC RBC AUTO-ENTMCNC: 35.3 G/DL (ref 31.5–36.5)
MCV RBC AUTO: 89 FL (ref 78–100)
MONOCYTES # BLD AUTO: 0.4 10E3/UL (ref 0–1.3)
MONOCYTES NFR BLD AUTO: 4 %
NEUTROPHILS # BLD AUTO: 7.9 10E3/UL (ref 1.6–8.3)
NEUTROPHILS NFR BLD AUTO: 74 %
PLATELET # BLD AUTO: 212 10E3/UL (ref 150–450)
POTASSIUM SERPL-SCNC: 4 MMOL/L (ref 3.4–5.3)
RBC # BLD AUTO: 3.67 10E6/UL (ref 3.8–5.2)
SODIUM SERPL-SCNC: 138 MMOL/L (ref 135–145)
TSH SERPL DL<=0.005 MIU/L-ACNC: 3.87 UIU/ML (ref 0.3–4.2)
VIT D+METAB SERPL-MCNC: 33 NG/ML (ref 20–50)
WBC # BLD AUTO: 10.7 10E3/UL (ref 4–11)

## 2025-07-07 PROCEDURE — 83540 ASSAY OF IRON: CPT | Performed by: OBSTETRICS & GYNECOLOGY

## 2025-07-07 PROCEDURE — 36415 COLL VENOUS BLD VENIPUNCTURE: CPT | Performed by: OBSTETRICS & GYNECOLOGY

## 2025-07-07 PROCEDURE — 99213 OFFICE O/P EST LOW 20 MIN: CPT | Mod: 25 | Performed by: OBSTETRICS & GYNECOLOGY

## 2025-07-07 PROCEDURE — 80048 BASIC METABOLIC PNL TOTAL CA: CPT | Performed by: OBSTETRICS & GYNECOLOGY

## 2025-07-07 PROCEDURE — 3074F SYST BP LT 130 MM HG: CPT | Performed by: OBSTETRICS & GYNECOLOGY

## 2025-07-07 PROCEDURE — 99207 PR PRENATAL VISIT: CPT | Performed by: OBSTETRICS & GYNECOLOGY

## 2025-07-07 PROCEDURE — 85025 COMPLETE CBC W/AUTO DIFF WBC: CPT | Performed by: OBSTETRICS & GYNECOLOGY

## 2025-07-07 PROCEDURE — 84443 ASSAY THYROID STIM HORMONE: CPT | Performed by: OBSTETRICS & GYNECOLOGY

## 2025-07-07 PROCEDURE — 3078F DIAST BP <80 MM HG: CPT | Performed by: OBSTETRICS & GYNECOLOGY

## 2025-07-07 PROCEDURE — 82306 VITAMIN D 25 HYDROXY: CPT | Performed by: OBSTETRICS & GYNECOLOGY

## 2025-07-07 PROCEDURE — 0502F SUBSEQUENT PRENATAL CARE: CPT | Performed by: OBSTETRICS & GYNECOLOGY

## 2025-07-07 NOTE — NURSING NOTE
"Chief Complaint   Patient presents with    Prenatal Care     24 weeks 5 days          Initial BP (!) 88/52 (BP Location: Right arm, Cuff Size: Adult Regular)   Wt 56.4 kg (124 lb 4.8 oz)   LMP 01/15/2025 (Exact Date)   BMI 25.11 kg/m   Estimated body mass index is 25.11 kg/m  as calculated from the following:    Height as of 25: 1.499 m (4' 11\").    Weight as of this encounter: 56.4 kg (124 lb 4.8 oz).  BP completed using cuff size: regular    Questioned patient about current smoking habits.  Pt. has never smoked.    24w5d       The following HM Due: NONE        +FM daily       Cait Neff, CMA on 2025 at 10:12 AM             "

## 2025-07-07 NOTE — PROGRESS NOTES
35 year old  at 24w5d     - A+/RI.  FOB Butch.  NIPT nl XY.  Anatomy US nl  - history of molar pregnancy s/p D&C.  After delivery plan to send placenta to pathology for histology, and measure HCG six weeks postpartum.   - hypothyroidism: normal TSH in first tri, plan to check TSH with GCT labs - wants to check labs today  - hair falling out - wants to check iron and vit D today    1. Supervision of other normal pregnancy, antepartum (Primary)/hair falling out  - Vitamin D Deficiency; Future  - Iron; Future  - TSH with free T4 reflex; Future  - CBC with platelets; Future      RTC 4 wks    Angeline Pratt MD, MPH  [facility]

## 2025-07-15 ENCOUNTER — TELEPHONE (OUTPATIENT)
Dept: CONSULT | Facility: CLINIC | Age: 35
End: 2025-07-15
Payer: COMMERCIAL

## 2025-07-15 DIAGNOSIS — Z84.81 FAMILY HISTORY OF CARRIER OF GENETIC DISEASE: Primary | ICD-10-CM

## 2025-07-15 NOTE — TELEPHONE ENCOUNTER
Today, I spoke with Marisel and her  Butch regarding genetic testing for their daughter, Georges Rodriguez. 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 While they are not strongly suspicious variants, Jennifer's parents would like to have parental studies done while they wait to have her brain MRI done and pursue whole exome sequencing. I see this as a reasonable course of action and have entered orders for parental sample collection. [facility]'s care team is encouraged to reach out to me with questions/concerns.     Lachelle Diamond MS, [facility]  Genetic Counselor - [facility]  Phone: [phone]  Email: [email or web address]

## 2025-07-16 ENCOUNTER — LAB (OUTPATIENT)
Dept: LAB | Facility: CLINIC | Age: 35
End: 2025-07-16
Payer: COMMERCIAL

## 2025-07-16 DIAGNOSIS — Z84.81 FAMILY HISTORY OF CARRIER OF GENETIC DISEASE: ICD-10-CM

## 2025-07-16 LAB
LAB ORDER RESULT STATUS: NORMAL
PERFORMING LABORATORY: NORMAL
TEST NAME: NORMAL

## 2025-07-16 PROCEDURE — 36415 COLL VENOUS BLD VENIPUNCTURE: CPT

## 2025-07-21 ENCOUNTER — HOSPITAL ENCOUNTER (OUTPATIENT)
Facility: CLINIC | Age: 35
Discharge: HOME | End: 2025-07-21
Attending: OBSTETRICS & GYNECOLOGY | Admitting: OBSTETRICS & GYNECOLOGY
Payer: COMMERCIAL

## 2025-07-21 ENCOUNTER — APPOINTMENT (OUTPATIENT)
Dept: ULTRASOUND IMAGING | Facility: CLINIC | Age: 35
End: 2025-07-21
Attending: OBSTETRICS & GYNECOLOGY
Payer: COMMERCIAL

## 2025-07-21 ENCOUNTER — TELEPHONE (OUTPATIENT)
Dept: OBGYN | Facility: CLINIC | Age: 35
End: 2025-07-21

## 2025-07-21 VITALS — TEMPERATURE: 98 F | RESPIRATION RATE: 18 BRPM | DIASTOLIC BLOOD PRESSURE: 62 MMHG | SYSTOLIC BLOOD PRESSURE: 100 MMHG

## 2025-07-21 LAB
ALBUMIN UR-MCNC: NEGATIVE MG/DL
APPEARANCE UR: CLEAR
BACTERIA #/AREA URNS HPF: ABNORMAL /HPF
BILIRUB UR QL STRIP: NEGATIVE
CLUE CELLS: NORMAL
COLOR UR AUTO: ABNORMAL
CRYSTALS AMN MICRO: NORMAL
GLUCOSE UR STRIP-MCNC: NEGATIVE MG/DL
HGB UR QL STRIP: ABNORMAL
KETONES UR STRIP-MCNC: NEGATIVE MG/DL
LEUKOCYTE ESTERASE UR QL STRIP: NEGATIVE
NITRATE UR QL: NEGATIVE
PH UR STRIP: 6.5 [PH] (ref 5–7)
RBC URINE: 2 /HPF
SP GR UR STRIP: 1.01 (ref 1–1.03)
SQUAMOUS EPITHELIAL: 2 /HPF
TRICHOMONAS, WET PREP: NORMAL
UROBILINOGEN UR STRIP-MCNC: NORMAL MG/DL
WBC URINE: 1 /HPF
WBC'S/HIGH POWER FIELD, WET PREP: NORMAL
YEAST, WET PREP: NORMAL

## 2025-07-21 PROCEDURE — 87210 SMEAR WET MOUNT SALINE/INK: CPT | Performed by: OBSTETRICS & GYNECOLOGY

## 2025-07-21 PROCEDURE — 87491 CHLMYD TRACH DNA AMP PROBE: CPT | Performed by: OBSTETRICS & GYNECOLOGY

## 2025-07-21 PROCEDURE — G0463 HOSPITAL OUTPT CLINIC VISIT: HCPCS | Mod: 6MC

## 2025-07-21 PROCEDURE — 81003 URINALYSIS AUTO W/O SCOPE: CPT | Performed by: OBSTETRICS & GYNECOLOGY

## 2025-07-21 PROCEDURE — 76815 OB US LIMITED FETUS(S): CPT

## 2025-07-21 PROCEDURE — 99213 OFFICE O/P EST LOW 20 MIN: CPT | Performed by: OBSTETRICS & GYNECOLOGY

## 2025-07-21 RX ORDER — LIDOCAINE 40 MG/G
CREAM TOPICAL
Status: DISCONTINUED | OUTPATIENT
Start: 2025-07-21 | End: 2025-07-21 | Stop reason: HOSPADM

## 2025-07-21 ASSESSMENT — ACTIVITIES OF DAILY LIVING (ADL)
ADLS_ACUITY_SCORE: 42

## 2025-07-21 NOTE — PROVIDER NOTIFICATION
Dr. Suresh Richard paged. 1210- Update given per admitting note. Orders received. 1240- attempted speculum exam, patient declined, due to too uncomfortable/painful. Dr. Prince updated. Will come to bedside. Dr. Prince at bedside. Speculum exam done. Specimens to lab. Plan per provider: obtain BPP an patient may have a regular diet.

## 2025-07-21 NOTE — PLAN OF CARE
Data: [facility] presented to [facility]: 2025 10:51 AM.  Reason for maternal/fetal assessment is decreased fetal movement, leaking vaginal fluid. Patient reports leaking fluid off and on since yesterday AM.  Patient is a .  Prenatal record reviewed. Pregnancy has been uncomplicated..  Gestational Age 26w5d. VSS. Fetal movement decreased since this AM. Patient denies uterine contractions, vaginal bleeding, abdominal pain. Support person is not present.   Action: Verbal consent for EFM. Triage assessment completed. Bill of rights reviewed.  Response: Patient verbalized agreement with plan. 1150- Will contact Dr Davis Prince with update and for further orders.

## 2025-07-21 NOTE — DISCHARGE INSTRUCTIONS
Learning About When to Call Your Doctor During Pregnancy (After 20 Weeks)  Overview    Follow up at next scheduled appointment.  It's common to have concerns about what might be a problem when you're pregnant. Most pregnancies don't have any serious problems. But it's still important to know when to call your doctor if you have certain symptoms or signs of labor.  These are general suggestions. Your doctor may give you some more information about when to call.  When to call your doctor (after 20 weeks)  Call 911 anytime you think you may need emergency care. For example, call if:  You have severe vaginal bleeding. You have soaked through one or more pads in an hour, and the bleeding is not slowing down.  You have sudden, severe pain in your belly that does not go away.  You have chest pain, are short of breath, or cough up blood.  You passed out (lost consciousness).  You have a seizure.  You see or feel the umbilical cord.  You think you are about to deliver your baby and can't make it safely to the hospital or birthing center.  Call your doctor now or seek immediate medical care if:  You have vaginal bleeding.  You have belly pain.  You have a fever.  You are dizzy or lightheaded, or you feel like you may faint.  You have signs of a blood clot in your leg (called a deep vein thrombosis), such as:  Pain in the calf, back of the knee, thigh, or groin.  Swelling in your leg or groin.  A color change on the leg or groin. The skin may be reddish or purplish.  You have symptoms of preeclampsia, such as:  Sudden swelling of your face, hands, or feet.  New vision problems (such as dimness, blurring, or seeing spots).  A severe headache that will not go away.  You have a sudden release or slow trickle of fluid from your vagina. This may mean your water has broken.  You've been having regular contractions for an hour at less than 37 weeks. This means that you've had at least 6 contractions within 1 hour, even after you  "change your position and drink fluids.  You notice that your baby has stopped moving or is moving less than normal.  You have signs of heart failure, such as:  New or increased shortness of breath.  New or worse swelling in your legs, ankles, or feet.  Sudden weight gain, such as more than 2 to 3 pounds in a day or 5 pounds in a week.  Feeling so tired or weak that you cannot do your usual activities.  You have symptoms of a urinary tract infection. These may include:  Pain or burning when you urinate.  A frequent need to urinate without being able to pass much urine.  Pain in your low back (below the rib cage and above the waist).  Blood in your urine.  Watch closely for changes in your health, and be sure to contact your doctor if:  You have vaginal discharge that smells bad.  You feel sad, anxious, or hopeless for more than a few days.  You have skin changes, such as a rash, itching, or a yellow color to your skin.  You have other concerns about your pregnancy.  If you have labor signs at 37 weeks or more  If you have signs of labor at 37 weeks or more, your doctor may tell you to call when your labor becomes more active. During active labor:  Contractions happen more often and at regular intervals (about every 3 to 5 minutes).  Contractions last longer (about 60 seconds or more).  Contractions get stronger and are hard to talk through.  Follow-up care is a key part of your treatment and safety. Be sure to make and go to all appointments, and call your doctor if you are having problems. It's also a good idea to know your test results and keep a list of the medicines you take.  Where can you learn more?  Go to https://www.[employer].net/patiented  Enter N531 in the search box to learn more about \"Learning About When to Call Your Doctor During Pregnancy (After 20 Weeks).\"  Current as of: April 30, 2024  Content Version: 14.5 2024-2025 [facility] [employer].   Care instructions adapted under license by your " healthcare professional. If you have questions about a medical condition or this instruction, always ask your healthcare professional. [employer] disclaims any warranty or liability for your use of this information.

## 2025-07-21 NOTE — PROVIDER NOTIFICATION
07/21/25 1500   Provider Notification   Provider Name/Title Dr. Prince   Method of Notification Phone   Request Evaluate - Remote     US results and lab results reviewed. Plan per provider: discharge patient to home. Patient to follow up at next scheduled office appointment and to call if leaking continues, bleeding, questions or concerns. Patient OK with POC, discharged to home ambulatory.

## 2025-07-21 NOTE — TELEPHONE ENCOUNTER
"26w5d      Pt calling with \"clear liquid discharge\" and some mild cramping on lower right side    Yesterday morning at around 1000 started having liquid discharge and has continued through today.  Wearing liner and is soaking the liner. Changing every 1-1.5 hrs. Pt states it feels wet like water. Pt states does not smell like urine.     Denies any vaginal bleeding. Denies any vaginal burning or itching.    Pt has not felt any fetal movement since last night. Has not had anything to eat or drink today.    Pt also states she feels constant need to urinate.    Pt states she made OB appt online at a [facility] today but they would not see her as she is not an established pt.     Pt advised to go to L&D unit at [facility] for evaluation. Pt agreeable.     Report called to charge nurse on unit.     Sheri WADDELL RN  OB/GYN [facility]                "

## 2025-07-21 NOTE — H&P
"  2025    Mariselnando Perry  [id number]            OB Observation Note      Ms. Perry  is here for leaking fluid.    She has noticed leaking fluid starting around 10 AM yesterday. She leaked clear fluid that she did think smelled like urine and was sufficient in volume to soak a pad.  She continues to have occasional leakage all the way until today and so she decided to call.  She was not able to explain why she did not call yesterday when her symptoms first arose.  She has not had any contractions or vaginal bleeding.  Her fetus is actively moving now normally.  She has no dysuria or frequency.    Patient's last menstrual period was 01/15/2025 (exact date).   Her Estimated Date of Delivery: Oct 22, 2025, making her 26w5d.      Estimated body mass index is 25.11 kg/m  as calculated from the following:    Height as of 25: 1.499 m (4' 11\").    Weight as of 25: 56.4 kg (124 lb 4.8 oz).  Her prenatal course has been with Dr. Leung complicated by prior pregnancy being delivered via vacuum-assisted vaginal delivery at term.    See prenatal for labs.  Unknown GBS, Rubella Immune, RH Positive         She is a 35 year old   Her OB history:   OB History    Para Term  AB Living   4 1 1 0 2 1   SAB IAB Ectopic Multiple Live Births   0 0 0 0 1      # Outcome Date GA Lbr Jimy/2nd Weight Sex Type Anes PTL Lv   4 Current            3 Molar 2024              Birth Comments: suction D&C   2 Term 20 38w0d 12:50 / 03:14 3.18 kg (7 lb 0.2 oz) F Vag-Vacuum EPI N LUZ      Complications: GBS, Prolonged PROM (>18 hours), Dysfunctional Labor      Name: MESSI,FEMALE-[facility]      Apgar1: 7  Apgar5: 9   1 AB 19 8w4d                   Past Medical History:   Diagnosis Date    History of blood transfusion     as a child    History of molar pregnancy 2024    Hypothyroidism     Primary female infertility     Vacuum extraction, delivered, current hospitalization 2020    Varicella           Past " Surgical History:   Procedure Laterality Date    DILATION AND CURETTAGE SUCTION N/A 4/10/2024    Procedure: DILATION AND CURETTAGE, UTERUS, USING SUCTION with ultrasound, repair of vaginal laceration;  Surgeon: Miladis Fisher MD;  Location:  OR    ESOPHAGOSCOPY, GASTROSCOPY, DUODENOSCOPY (EGD), COMBINED N/A 11/20/2019    Procedure: ESOPHAGOGASTRODUODENOSCOPY, WITH BIOPSY;  Surgeon: Evan Aaron MD;  Location: [facility]         No current outpatient medications on file.       Allergies: Patient has no known allergies.      REVIEW OF SYSTEMS:  NEUROLOGIC:  Negative  EYES:  Negative  ENT:  Negative  GI:  Negative  :  Negative  GYN:  Negative  CV:  Negative  PULMONARY:  Negative  MUSCULOSKELETAL:  Negative  PSYCH:  Negative        Social History     Socioeconomic History    Marital status:      Spouse name: Butch Ricardo    Number of children: 1    Years of education: Not on file    Highest education level: Master's degree (e.g., MA, MS, Alix, MEd, MSW, KAYE)   Occupational History    Occupation: IT   Tobacco Use    Smoking status: Never     Passive exposure: Never    Smokeless tobacco: Never   Vaping Use    Vaping status: Never Used   Substance and Sexual Activity    Alcohol use: No    Drug use: No    Sexual activity: Yes     Partners: Male   Other Topics Concern    Not on file   Social History Narrative    Not on file     Social Drivers of Health     Financial Resource Strain: Low Risk  (10/2/2024)    Financial Resource Strain     Within the past 12 months, have you or your family members you live with been unable to get utilities (heat, electricity) when it was really needed?: No   Food Insecurity: Low Risk  (10/2/2024)    Food Insecurity     Within the past 12 months, did you worry that your food would run out before you got money to buy more?: No     Within the past 12 months, did the food you bought just not last and you didn t have money to get more?: No   Transportation Needs: Low Risk   (10/2/2024)    Transportation Needs     Within the past 12 months, has lack of transportation kept you from medical appointments, getting your medicines, non-medical meetings or appointments, work, or from getting things that you need?: No   Physical Activity: Unknown (10/2/2024)    Exercise Vital Sign     Days of Exercise per Week: 0 days     Minutes of Exercise per Session: Not on file   Stress: No Stress Concern Present (10/2/2024)    [employer] [place] [employer] - Occupational Stress Questionnaire     Feeling of Stress : Only a little   Social Connections: Unknown (10/2/2024)    Social Connection and Isolation Panel [NHANES]     Frequency of Communication with Friends and Family: Not on file     Frequency of Social Gatherings with Friends and Family: Once a week     Attends Sabianist Services: Not on file     Active Member of Clubs or Organizations: Not on file     Attends Club or Organization Meetings: Not on file     Marital Status: Not on file   Interpersonal Safety: Low Risk  (1/2/2025)    Interpersonal Safety     Do you feel physically and emotionally safe where you currently live?: Yes     Within the past 12 months, have you been hit, slapped, kicked or otherwise physically hurt by someone?: No     Within the past 12 months, have you been humiliated or emotionally abused in other ways by your partner or ex-partner?: No   Housing Stability: Low Risk  (10/2/2024)    Housing Stability     Do you have housing? : Yes     Are you worried about losing your housing?: No      Family History   Problem Relation Age of Onset    Hypertension Mother     Diabetes Type 2  Father     Breast Cancer No family hx of          Exam:    Vitals:  LMP 01/15/2025 (Exact Date)   FHT: Reactive, category 1    [place]: No contractions noted    Gen- Alert Awake in NAD  HEENT grossly normal  Neck: no lymphadenopathy or thryoidomegaly  Lungs CTAB  Back No CVAT  Heart RR  ABD gravid, NABS, soft, NT on exam with NT fundus  palpable  SSE - Perineum dry, EGBUS WNL, Vagina normal with only normal physiologic secretions, no pooling of fluid, Cx-no lesions, no fluid leaking, normal mucous present. Fern sent, GC/Chlam, and Wet prep sent.  EXT:  No edema, no calf tenderness      Assessment:    1)  IUP at 26w5d    2)  Leaking fluid - based on exam this tends to favor urinary incontinence rather than PPROM. Also want to check for UTI, STDs, and vaginitis, but these are also not too likely.    3)  Ts currently Cat 1 for early GA.      Plan:    1)  Fern sent.    2)  GC/Chlam    3)  Wet prep    4)  UA, reflex to culture    5)  U/S for AFV.    6)  If all neg and U/S shows normal fluid, then likely leakage represents urinary incontinence from bladder pressure exerted by gravid uterus.      Davis Prince MD  July 21, 2025

## 2025-07-21 NOTE — PROVIDER NOTIFICATION
07/21/25 1400   Provider Notification   Provider Name/Title Dr. Prince   Method of Notification Phone   Request Evaluate - Remote   Notification Reason Other (Comment)     Baby more active now. Ultrasound order clarified. Plan per provider: Obtain limited US to check amniotic fluid volume.

## 2025-07-22 LAB
C TRACH DNA SPEC QL PROBE+SIG AMP: NEGATIVE
N GONORRHOEA DNA SPEC QL NAA+PROBE: NEGATIVE
SPECIMEN TYPE: NORMAL

## 2025-08-14 LAB
LAB ORDER RESULT STATUS: NORMAL
PERFORMING LABORATORY: NORMAL
SCANNED LAB RESULT: NORMAL
TEST NAME: NORMAL

## (undated) DEVICE — ESU ELEC BLADE 6" COATED/INSULATED E1455-6

## (undated) DEVICE — PREP SCRUB SOL EXIDINE 4% CHG 4OZ 29002-404

## (undated) DEVICE — PACK MINOR LITHOTOMY RIDGES

## (undated) DEVICE — PAD CHUX UNDERPAD 30X36" P3036C

## (undated) DEVICE — BAG CLEAR TRASH 1.3M 39X33" P4040C

## (undated) DEVICE — LINEN FULL SHEET 5511

## (undated) DEVICE — SOL WATER IRRIG 1000ML BOTTLE 2F7114

## (undated) DEVICE — ESU PENCIL W/HOLSTER E2350H

## (undated) DEVICE — SPECIMEN TRAP VACUUM SUCTION 003984-901

## (undated) DEVICE — TUBING VACUUM COLLECTION 6FT 23116

## (undated) DEVICE — Device

## (undated) DEVICE — SUCTION CANNULA UTERINE 08MM CVD  20317

## (undated) DEVICE — SOL NACL 0.9% IRRIG 1000ML BOTTLE 2F7124

## (undated) DEVICE — LINEN HALF SHEET 5512

## (undated) DEVICE — FILTER BERKLEY SAFETOUCH

## (undated) DEVICE — SU VICRYL 2-0 CT-2 27" J333H

## (undated) DEVICE — ESU GROUND PAD ADULT W/CORD E7507

## (undated) DEVICE — GLOVE BIOGEL PI MICRO INDICATOR UNDERGLOVE SZ 6.0 48960

## (undated) DEVICE — GLOVE PROTEXIS W/NEU-THERA 5.5  2D73TE55

## (undated) RX ORDER — FENTANYL CITRATE 50 UG/ML
INJECTION, SOLUTION INTRAMUSCULAR; INTRAVENOUS
Status: DISPENSED
Start: 2019-11-20

## (undated) RX ORDER — ACETAMINOPHEN 325 MG/1
TABLET ORAL
Status: DISPENSED
Start: 2024-04-10

## (undated) RX ORDER — FENTANYL CITRATE 50 UG/ML
INJECTION, SOLUTION INTRAMUSCULAR; INTRAVENOUS
Status: DISPENSED
Start: 2024-04-10

## (undated) RX ORDER — TRANEXAMIC ACID 100 MG/ML
INJECTION, SOLUTION INTRAVENOUS
Status: DISPENSED
Start: 2024-04-10

## (undated) RX ORDER — OXYCODONE HYDROCHLORIDE 5 MG/1
TABLET ORAL
Status: DISPENSED
Start: 2024-04-10

## (undated) RX ORDER — DOXYCYCLINE 100 MG/10ML
INJECTION, POWDER, LYOPHILIZED, FOR SOLUTION INTRAVENOUS
Status: DISPENSED
Start: 2024-04-10